# Patient Record
Sex: MALE | Race: WHITE | NOT HISPANIC OR LATINO | Employment: FULL TIME | URBAN - METROPOLITAN AREA
[De-identification: names, ages, dates, MRNs, and addresses within clinical notes are randomized per-mention and may not be internally consistent; named-entity substitution may affect disease eponyms.]

---

## 2017-02-08 ENCOUNTER — TELEPHONE (OUTPATIENT)
Dept: FAMILY MEDICINE | Facility: CLINIC | Age: 62
End: 2017-02-08

## 2017-02-08 DIAGNOSIS — R05.9 COUGH: Primary | ICD-10-CM

## 2017-02-08 NOTE — TELEPHONE ENCOUNTER
Ventolin Inhaler       Last Written Prescription Date: 9/22/2016  Last Fill Quantity: 1 inhaler, # refills: 0    Last Office Visit with G, P or [facility] prescribing provider:  12/15/2016   Future Office Visit:       Date of Last Asthma Action Plan Letter:   There are no preventive care reminders to display for this patient.   Asthma Control Test: No flowsheet data found.    Date of Last Spirometry Test:   No results found for this or any previous visit.

## 2017-02-14 NOTE — TELEPHONE ENCOUNTER
This was given by the ED for bronchitis. Left message for patient to return call  Tatianna Doyle RN

## 2017-02-15 RX ORDER — ALBUTEROL SULFATE 90 UG/1
2 AEROSOL, METERED RESPIRATORY (INHALATION) EVERY 4 HOURS PRN
Qty: 1 INHALER | Refills: 0 | Status: SHIPPED | OUTPATIENT
Start: 2017-02-15 | End: 2017-11-20

## 2017-04-04 ENCOUNTER — OFFICE VISIT (OUTPATIENT)
Dept: FAMILY MEDICINE | Facility: CLINIC | Age: 62
End: 2017-04-04
Payer: COMMERCIAL

## 2017-04-04 VITALS
TEMPERATURE: 98.3 F | WEIGHT: 177.6 LBS | HEIGHT: 66 IN | BODY MASS INDEX: 28.54 KG/M2 | OXYGEN SATURATION: 96 % | DIASTOLIC BLOOD PRESSURE: 67 MMHG | SYSTOLIC BLOOD PRESSURE: 111 MMHG | HEART RATE: 72 BPM

## 2017-04-04 DIAGNOSIS — A08.4 VIRAL GASTROENTERITIS: Primary | ICD-10-CM

## 2017-04-04 DIAGNOSIS — R19.7 DIARRHEA OF PRESUMED INFECTIOUS ORIGIN: ICD-10-CM

## 2017-04-04 LAB
C DIFF TOX B STL QL: NORMAL
CAMPYLOBACTER GROUP BY NAT: NOT DETECTED
ENTERIC PATHOGEN COMMENT: ABNORMAL
NOROVIRUS I AND II BY NAT: ABNORMAL
ROTAVIRUS A BY NAT: NOT DETECTED
SALMONELLA SPECIES BY NAT: NOT DETECTED
SHIGA TOXIN 1 GENE BY NAT: NOT DETECTED
SHIGA TOXIN 2 GENE BY NAT: NOT DETECTED
SHIGELLA SP+EIEC IPAH STL QL NAA+PROBE: NOT DETECTED
SPECIMEN SOURCE: NORMAL
VIBRIO GROUP BY NAT: NOT DETECTED
YERSINIA ENTEROCOLITICA BY NAT: NOT DETECTED

## 2017-04-04 PROCEDURE — 87177 OVA AND PARASITES SMEARS: CPT | Performed by: INTERNAL MEDICINE

## 2017-04-04 PROCEDURE — 87493 C DIFF AMPLIFIED PROBE: CPT | Performed by: INTERNAL MEDICINE

## 2017-04-04 PROCEDURE — 87506 IADNA-DNA/RNA PROBE TQ 6-11: CPT | Performed by: INTERNAL MEDICINE

## 2017-04-04 PROCEDURE — 87329 GIARDIA AG IA: CPT | Performed by: INTERNAL MEDICINE

## 2017-04-04 PROCEDURE — 87209 SMEAR COMPLEX STAIN: CPT | Performed by: INTERNAL MEDICINE

## 2017-04-04 PROCEDURE — 99213 OFFICE O/P EST LOW 20 MIN: CPT | Performed by: INTERNAL MEDICINE

## 2017-04-04 NOTE — LETTER
[facility]  [address] [address]  Phone: [phone]    April 4, 2017        Donnie Ernandez  [address] [place] [facility] [address]          To whom it may concern:    RE: Donnie Ernandez    Patient was seen and treated today at our clinic.  He missed work on 4/3/17 and 4/4/17.    Patient may return to work tomorrow if improved, otherwise he should continue to stay home until feeling better.       Please contact me for questions or concerns.      Sincerely,        Galileo Green MD

## 2017-04-04 NOTE — NURSING NOTE
"Chief Complaint   Patient presents with     Diarrhea     x 4 days, w/ stomach cramps,        Initial /67 (BP Location: Left arm, Patient Position: Chair, Cuff Size: Adult Regular)  Pulse 72  Temp 98.3  F (36.8  C) (Tympanic)  Ht 5' 6\" (1.676 m)  Wt 177 lb 9.6 oz (80.6 kg)  SpO2 96%  BMI 28.67 kg/m2 Estimated body mass index is 28.67 kg/(m^2) as calculated from the following:    Height as of this encounter: 5' 6\" (1.676 m).    Weight as of this encounter: 177 lb 9.6 oz (80.6 kg).  Medication Reconciliation: complete   Beatriz STANTON CMA (AAMA)    "

## 2017-04-04 NOTE — MR AVS SNAPSHOT
After Visit Summary   4/4/2017    Donnie Ernandez    MRN: [id number]           Patient Information     Date Of Birth          1955        Visit Information        Provider Department      4/4/2017 8:40 AM Galileo Green MD [facility]        Today's Diagnoses     Diarrhea of presumed infectious origin    -  1      Care Instructions    I would recommend trying a probiotic and you can also try Imodium (over the counter) to help with diarrhea.      Viral Gastroenteritis (Adult)    Gastroenteritis is commonly called the stomach flu. It is most often caused by a virus that affects the stomach and intestinal tract and usually lasts from 2 to 7 days. Common viruses causing gastroenteritis include norovirus, rotavirus, and hepatitis A. Non-viral causes of gastroenteritis include bacteria, parasites, and toxins.  The danger from repeated vomiting or diarrhea is dehydration. This is the loss of too much fluid from the body. When this occurs, body fluids must be replaced. Antibiotics do not help with this illness because it is usually viral.Simple home treatment will be helpful.  Symptoms of viral gastroenteritis may include:    Watery, loose stools    Stomach pain or abdominal cramps    Fever and chills    Nausea and vomiting    Loss of bowel control    Headache  Home care  Gastroenteritis is transmitted by contact with the stool or vomit of an infected person. This can occur from person to person or from contact with a contaminated surface.  Follow these guidelines when caring for yourself at home:    If symptoms are severe, rest at home for the next 24 hours or until you are feeling better.    Wash your hands with soap and water or use alcohol-based  to prevent the spread of infection. Wash your hands after touching anyone who is sick.    Wash your hands or use alcohol-based  after using the toilet and before meals. Clean the toilet after each use.  Remember these tips when  preparing food:    People with diarrhea should not prepare or serve food to others. When preparing foods, wash your hands before and after.    Wash your hands after using cutting boards, countertops, knives, or utensils that have been in contact with raw food.    Keep uncooked meats away from cooked and ready-to-eat foods.  Medicine  You may use acetaminophen or NSAID medicines like ibuprofen or naproxen to control fever unless another medicine was given. If you have chronic liver or kidney disease, talk with your healthcare provider before using these medicines. Also talk with your provider if you've had a stomach ulcer or gastrointestinal bleeding. Don't give aspirin to anyone under 18 years of age who is ill with a fever. It may cause severe liver damage. Don't use NSAIDS is you are already taking one for another condition (like arthritis) or are on aspirin (such as for heart disease or after a stroke).  If medicine for vomiting or diarrhea are prescribed, take these only as directed. Do not take over-the-counter medicines for vomiting or diarrhea unless instructed by your healthcare provider.  Diet  Follow these guidelines for food:    Water and liquids are important so you don't get dehydrated. Drink a small amount at a time or suck on ice chips if you are vomiting.    If you eat, avoid fatty, greasy, spicy, or fried foods.    Don't eat dairy if you have diarrhea. This can make diarrhea worse.    Avoid tobacco, alcohol, and caffeine which may worsen symptoms.  During the first 24 hours (the first full day), follow the diet below:    Beverages. Sports drinks, soft drinks without caffeine, ginger ale, mineral water (plain or flavored), decaffeinated tea and coffee. If you are very dehydrated, sports drinks aren't a good choice. They have too much sugar and not enough electrolytes. In this case, commercially available products called oral rehydration solutions, are best.    Soups. Eat clear broth, consommé, and  bouillon.    Desserts. Eat gelatin, popsicles, and fruit juice bars.  During the next 24 hours (the second day), you may add the following to the above:    Hot cereal, plain toast, bread, rolls, and crackers    Plain noodles, rice, mashed potatoes, chicken noodle or rice soup    Unsweetened canned fruit (avoid pineapple), bananas    Limit fat intake to less than 15 grams per day. Do this by avoiding margarine, butter, oils, mayonnaise, sauces, gravies, fried foods, peanut butter, meat, poultry, and fish.    Limit fiber and avoid raw or cooked vegetables, fresh fruits (except bananas), and bran cereals.    Limit caffeine and chocolate. Don't use spices or seasonings other than salt.    Limit dairy products.    Avoid alcohol.  During the next 24 hours:    Gradually resume a normal diet as you feel better and your symptoms improve.    If at any time it starts getting worse again, go back to clear liquids until you feel better.  Follow-up care  Follow up with your healthcare provider, or as advised. Call your provider if you don't get better within 24 hours or if diarrhea lasts more than a week. Also follow up if you are unable to keep down liquids and get dehydrated. If a stool (diarrhea) sample was taken, call as directed for the results.  Call 911  Call 911 if any of these occur:    Trouble breathing    Chest pain    Confused    Severe drowsiness or trouble awakening    Fainting or loss of consciousness    Rapid heart rate    Seizure    Stiff neck  When to seek medical advice  Call your healthcare provider right away if any of these occur:    Abdominal pain that gets worse    Continued vomiting (unable to keep liquids down)    Frequent diarrhea (more than 5 times a day)    Blood in vomit or stool (black or red color)    Dark urine, reduced urine output, or extreme thirst    Weakness or dizziness    Drowsiness    Fever of 100.4 F (38 C) oral or higher that does not get better with fever medicine    New rash     "[id number] The "[employer]. [address] [place] [address], [place] [address]. All rights reserved. This information is not intended as a substitute for professional medical care. Always follow your healthcare professional's instructions.              Follow-ups after your visit        Future tests that were ordered for you today     Open Future Orders        Priority Expected Expires Ordered    Enteric Bacteria and Virus Panel by SAMUEL Stool Routine  4/4/2018 4/4/2017    Giardia antigen Routine  4/4/2018 4/4/2017    Ova and Parasite Exam Routine Routine  4/4/2018 4/4/2017    Clostridium difficile toxin B PCR Routine  4/4/2018 4/4/2017            Who to contact     If you have questions or need follow up information about today's clinic visit or your schedule please contact [facility] directly at [phone].  Normal or non-critical lab and imaging results will be communicated to you by Allurenthart, letter or phone within 4 business days after the clinic has received the results. If you do not hear from us within 7 days, please contact the clinic through MyChart or phone. If you have a critical or abnormal lab result, we will notify you by phone as soon as possible.  Submit refill requests through [employer] or call your pharmacy and they will forward the refill request to us. Please allow 3 business days for your refill to be completed.          Additional Information About Your Visit        MyChart Information     [employer] lets you send messages to your doctor, view your test results, renew your prescriptions, schedule appointments and more. To sign up, go to [email or web address] . Click on \"Log in\" on the left side of the screen, which will take you to the Welcome page. Then click on \"Sign up Now\" on the right side of the page.     You will be asked to enter the access code listed below, as well as some personal information. Please follow the directions to create your username and password.     Your " "access code is: [email or web address]  Expires: 7/3/2017  9:33 AM     Your access code will  in 90 days. If you need help or a new code, please call your [facility] or [phone].        Care EveryWhere ID     This is your Care EveryWhere ID. This could be used by other organizations to access your [place] medical records  [id number]        Your Vitals Were     Pulse Temperature Height Pulse Oximetry BMI (Body Mass Index)       72 98.3  F (36.8  C) (Tympanic) 5' 6\" (1.676 m) 96% 28.67 kg/m2        Blood Pressure from Last 3 Encounters:   17 111/67   12/15/16 135/73   16 132/81    Weight from Last 3 Encounters:   17 177 lb 9.6 oz (80.6 kg)   12/15/16 181 lb (82.1 kg)   16 183 lb 9.6 oz (83.3 kg)               Primary Care Provider Office Phone # Fax #    Mae Galileo Jones, [facility] [phone]       [facility] [address] [address]        Thank you!     Thank you for choosing [facility]  for your care. Our goal is always to provide you with excellent care. Hearing back from our patients is one way we can continue to improve our services. Please take a few minutes to complete the written survey that you may receive in the mail after your visit with us. Thank you!             Your Updated Medication List - Protect others around you: Learn how to safely use, store and throw away your medicines at www.disposemymeds.org.          This list is accurate as of: 17  9:33 AM.  Always use your most recent med list.                   Brand Name Dispense Instructions for use    albuterol 108 (90 BASE) MCG/ACT Inhaler    albuterol    1 Inhaler    Inhale 2 puffs into the lungs every 4 hours as needed for shortness of breath / dyspnea Needs appt before next refill       fluticasone 50 MCG/ACT spray    FLONASE    1 Bottle    Spray 1 spray into both nostrils 2 times daily       losartan 50 MG tablet    COZAAR    90 tablet    Take 1 " tablet (50 mg) by mouth daily       metoprolol 50 MG tablet    LOPRESSOR    180 tablet    Take 1 tablet (50 mg) by mouth 2 times daily       * order for DME     1    C-Pap supplies. Mask of choice.       * order for DME      CPAP: 6 cm H2O  CPAP mask fitting  Lifetime need and heated humidity.       pregabalin 50 MG capsule    LYRICA    60 capsule    Take 1 capsule (50 mg) by mouth 2 times daily       TYLENOL EXTRA STRENGTH PO      Take by mouth 2 times daily       zolpidem 12.5 MG CR tablet    AMBIEN CR    30 tablet    Take 1 tablet (12.5 mg) by mouth nightly as needed for sleep       * Notice:  This list has 2 medication(s) that are the same as other medications prescribed for you. Read the directions carefully, and ask your doctor or other care provider to review them with you.

## 2017-04-04 NOTE — PROGRESS NOTES
SUBJECTIVE:                                                    Donnie Ernandez is a 61 year old male who presents to clinic today for the following health issues:  Chief Complaint   Patient presents with     Diarrhea     x 4 days, w/ stomach cramps,      Diarrhea     Onset: x     Description:   Consistency of stool: watery and mucousy  Blood in stool: no   Number of loose stools in past 24 hours: 8 already today    Progression of Symptoms:  same    Accompanying Signs & Symptoms:  Fever: no - but feels hot at home  Nausea or vomiting; YES- slight nausea no vomiting   Abdominal pain: YES  Episodes of constipation: no   Weight loss: YES   History:   Ill contacts: no   Recent use of antibiotics: no    Recent travels: no          Recent medication-new or changes(Rx or OTC): no     Precipitating factors:   Any food.    Patient reports eating a lot less, but having a lot of loose stool     Alleviating factors:   None        Therapies Tried and outcome:  None     Donnie started having symptoms overnight on Friday after eating at a deli for lunch that day.  Otherwise has not had any unusual foods exposures or travel.  He does have well water at home, though no one else has been sick.  Has a dog and a cat, no other animal exposures.  His stomach cramps are improving, but he continues to have multiple watery BMs daily.  No blood in stool.  He is having BMs overnight.     He does have a history of chronic diarrhea in the past, but he discovered this was due to lactose intolerance, and this has resolved since eliminating dairy from the diet.  Current symptoms are much more severe than those he had in the past.      Problem list and histories reviewed & adjusted, as indicated.  Additional history: as documented    Current Outpatient Prescriptions   Medication Sig Dispense Refill     losartan (COZAAR) 50 MG tablet Take 1 tablet (50 mg) by mouth daily 90 tablet 3     zolpidem (AMBIEN CR) 12.5 MG CR tablet Take 1 tablet (12.5 mg) by  "mouth nightly as needed for sleep 30 tablet 5     metoprolol (LOPRESSOR) 50 MG tablet Take 1 tablet (50 mg) by mouth 2 times daily 180 tablet 3     albuterol (ALBUTEROL) 108 (90 BASE) MCG/ACT Inhaler Inhale 2 puffs into the lungs every 4 hours as needed for shortness of breath / dyspnea Needs appt before next refill 1 Inhaler 0     Acetaminophen (TYLENOL EXTRA STRENGTH PO) Take by mouth 2 times daily       pregabalin (LYRICA) 50 MG capsule Take 1 capsule (50 mg) by mouth 2 times daily 60 capsule 1     fluticasone (FLONASE) 50 MCG/ACT nasal spray Spray 1 spray into both nostrils 2 times daily 1 Bottle 11     ORDER FOR DME CPAP:  6 cm H2O    CPAP mask fitting    Lifetime need and heated humidity.          ORDER FOR DME C-Pap supplies. Mask of choice. 1 1 year     Allergies   Allergen Reactions     Lisinopril Diarrhea and Cough     Advil [Alkylamines] GI Disturbance     Prednisone      Insomnia  Facial flushing  sweating     Sulfa Drugs Hives       Reviewed and updated as needed this visit by clinical staff  Tobacco  Allergies  Med Hx  Surg Hx  Fam Hx  Soc Hx      Reviewed and updated as needed this visit by Provider         ROS:  Constitutional and GI systems are negative, except as otherwise noted.    OBJECTIVE:                                                    /67 (BP Location: Left arm, Patient Position: Chair, Cuff Size: Adult Regular)  Pulse 72  Temp 98.3  F (36.8  C) (Tympanic)  Ht 5' 6\" (1.676 m)  Wt 177 lb 9.6 oz (80.6 kg)  SpO2 96%  BMI 28.67 kg/m2  Body mass index is 28.67 kg/(m^2).    GENERAL: healthy, alert and no distress  RESP: lungs clear to auscultation - no rales, rhonchi or wheezes  CV: regular rate and rhythm, normal S1 S2, no S3 or S4, no murmur, click or rub  ABDOMEN: soft, mild generalized pressure on palpation, no rebound or guarding, no hepatomegaly (spleen surgically absent), no masses and bowel sounds normal       ASSESSMENT/PLAN:                                              "           1. Viral gastroenteritis  2. Diarrhea of presumed infectious origin    Donnie's symptoms are most likely due to viral gastroenteritis, however, given the frequency of his stools, it is reasonable to check for other infectious causes if diarrhea does not improve soon.  Will check for parasites since he has well water.  He was counseled on home care, handout provided.  He can try some Imodium for diarrhea, though if infectious studies return positive, may need to discontinue this.      - Clostridium difficile toxin B PCR; Future  - Enteric Bacteria and Virus Panel by SAMUEL Stool; Future  - Giardia antigen; Future  - Ova and Parasite Exam Routine; Future  - Ova and Parasite Exam Routine; Future      FernandaLatha Green MD  [facility]

## 2017-04-05 LAB
G LAMBLIA AG STL QL IA: NORMAL
MICRO REPORT STATUS: NORMAL
MICRO REPORT STATUS: NORMAL
O+P STL MICRO: NORMAL
SPECIMEN SOURCE: NORMAL
SPECIMEN SOURCE: NORMAL

## 2017-04-17 ENCOUNTER — OFFICE VISIT (OUTPATIENT)
Dept: FAMILY MEDICINE | Facility: CLINIC | Age: 62
End: 2017-04-17
Payer: COMMERCIAL

## 2017-04-17 VITALS
HEART RATE: 62 BPM | HEIGHT: 66 IN | WEIGHT: 186.2 LBS | TEMPERATURE: 99.2 F | BODY MASS INDEX: 29.92 KG/M2 | OXYGEN SATURATION: 96 % | SYSTOLIC BLOOD PRESSURE: 150 MMHG | DIASTOLIC BLOOD PRESSURE: 74 MMHG

## 2017-04-17 DIAGNOSIS — J32.9 VIRAL SINUSITIS: Primary | ICD-10-CM

## 2017-04-17 DIAGNOSIS — B97.89 VIRAL SINUSITIS: Primary | ICD-10-CM

## 2017-04-17 PROCEDURE — 99213 OFFICE O/P EST LOW 20 MIN: CPT | Performed by: INTERNAL MEDICINE

## 2017-04-17 RX ORDER — FLUTICASONE PROPIONATE 50 MCG
1 SPRAY, SUSPENSION (ML) NASAL
Qty: 1 BOTTLE | Refills: 11 | Status: SHIPPED | OUTPATIENT
Start: 2017-04-17 | End: 2017-11-20

## 2017-04-17 RX ORDER — CODEINE PHOSPHATE AND GUAIFENESIN 10; 100 MG/5ML; MG/5ML
1 SOLUTION ORAL EVERY 4 HOURS PRN
Qty: 120 ML | Refills: 0 | Status: SHIPPED | OUTPATIENT
Start: 2017-04-17 | End: 2017-05-02

## 2017-04-17 NOTE — MR AVS SNAPSHOT
After Visit Summary   4/17/2017    Donnie Ernandez    MRN: [id number]           Patient Information     Date Of Birth          1955        Visit Information        Provider Department      4/17/2017 8:20 AM Galileo Green MD [facility]        Today's Diagnoses     Viral sinusitis    -  1      Care Instructions      If symptoms are not improved in one week, please call and we will send in an antibiotic.      Sinusitis (No Antibiotics)    The sinuses are air-filled spaces within the bones of the face. They connect to the inside of the nose. Sinusitis is an inflammation of the tissue lining the sinus cavity. Sinus inflammation can occur during a cold. It can also be due to allergies to pollens and other particles in the air. It can cause symptoms such as sinus congestion, headache, sore throat, facial swelling and fullness. It may also cause a low-grade fever. No infection is present, and no antibiotic treatment is needed.  Home care    Drink plenty of water, hot tea, and other liquids. This may help thin mucus. It also may promote sinus drainage.    Heat may help soothe painful areas of the face. Use a towel soaked in hot water. Or,  the shower and direct the hot spray onto your face. Using a vaporizer along with a menthol rub at night may also help.     An expectorant containing guaifenesin may help thin the mucus and promote drainage from the sinuses.    Over-the-counter decongestants may be used unless a similar medicine was prescribed. Nasal sprays work the fastest. Use one that contains phenylephrine or oxymetazoline. First blow the nose gently. Then use the spray. Do not use these medicines more often than directed on the label or symptoms may get worse. You may also use tablets containing pseudoephedrine. Avoid products that combine ingredients, because side effects may be increased. Read labels. You can also ask the pharmacist for help. (NOTE: Persons with high blood  pressure should not use decongestants. They can raise blood pressure.)    Over-the-counter antihistamines may help if allergies contributed to your sinusitis.      Use acetaminophen or ibuprofen to control pain, unless another pain medicine was prescribed. (If you have chronic liver or kidney disease or ever had a stomach ulcer, talk with your doctor before using these medicines. Aspirin should never be used in anyone under 18 years of age who is ill with a fever. It may cause severe liver damage.)    Use nasal rinses or irrigation as instructed by your health care provider.    Don't smoke. This can worsen symptoms.  Follow-up care  Follow up with your healthcare provider or our staff if you are not improving within the next week.  When to seek medical advice  Call your healthcare provider if any of these occur:    Green or yellow discharge from the nose or into the throat    Facial pain or headache becoming more severe    Stiff neck    Unusual drowsiness or confusion    Swelling of the forehead or eyelids    Vision problems, including blurred or double vision    Fever of 100.4 F (38 C) or higher, or as directed by your healthcare provider    Seizure    Breathing problems    Symptoms not resolving within 10 days    4905-7993 [employer]. [address] [place] [address]. All rights reserved. This information is not intended as a substitute for professional medical care. Always follow your healthcare professional's instructions.              Follow-ups after your visit        Who to contact     If you have questions or need follow up information about today's clinic visit or your schedule please contact [facility] directly at [phone].  Normal or non-critical lab and imaging results will be communicated to you by MyChart, letter or phone within 4 business days after the clinic has received the results. If you do not hear from us within 7 days, please contact the clinic through  "[facility] or phone. If you have a critical or abnormal lab result, we will notify you by phone as soon as possible.  Submit refill requests through [employer] or call your pharmacy and they will forward the refill request to us. Please allow 3 business days for your refill to be completed.          Additional Information About Your Visit        [employer]* Information     [employer] lets you send messages to your doctor, view your test results, renew your prescriptions, schedule appointments and more. To sign up, go to [email or web address] . Click on \"Log in\" on the left side of the screen, which will take you to the Welcome page. Then click on \"Sign up Now\" on the right side of the page.     You will be asked to enter the access code listed below, as well as some personal information. Please follow the directions to create your username and password.     Your access code is: [email or web address]  Expires: 7/3/2017  9:33 AM     Your access code will  in 90 days. If you need help or a new code, please call your [facility] clinic or [phone].        Care EveryWhere ID     This is your Care EveryWhere ID. This could be used by other organizations to access your [facility] medical records  [id number]        Your Vitals Were     Pulse Temperature Height Pulse Oximetry BMI (Body Mass Index)       62 99.2  F (37.3  C) (Tympanic) 5' 6\" (1.676 m) 96% 30.05 kg/m2        Blood Pressure from Last 3 Encounters:   17 150/74   17 111/67   12/15/16 135/73    Weight from Last 3 Encounters:   17 186 lb 3.2 oz (84.5 kg)   17 177 lb 9.6 oz (80.6 kg)   12/15/16 181 lb (82.1 kg)              Today, you had the following     No orders found for display         Today's Medication Changes          These changes are accurate as of: 17  8:45 AM.  If you have any questions, ask your nurse or doctor.               Start taking these medicines.        Dose/Directions    guaiFENesin-codeine 100-10 MG/5ML Soln solution "   Commonly known as:  ROBITUSSIN AC   Used for:  Viral sinusitis   Started by:  Galileo Green MD        Dose:  1 tsp.   Take 5 mLs by mouth every 4 hours as needed for cough   Quantity:  120 mL   Refills:  0            Where to get your medicines      These medications were sent to [facility] [place], [place] - [address]  [address] [address]     Phone:  [phone]     fluticasone 50 MCG/ACT spray         Some of these will need a paper prescription and others can be bought over the counter.  Ask your nurse if you have questions.     Bring a paper prescription for each of these medications     guaiFENesin-codeine 100-10 MG/5ML Soln solution                Primary Care Provider Office Phone # Fax #    Mae MAYANK Zhou [phone]       [facility] [address] [address]        Thank you!     Thank you for choosing [facility]  for your care. Our goal is always to provide you with excellent care. Hearing back from our patients is one way we can continue to improve our services. Please take a few minutes to complete the written survey that you may receive in the mail after your visit with us. Thank you!             Your Updated Medication List - Protect others around you: Learn how to safely use, store and throw away your medicines at www.disposemymeds.org.          This list is accurate as of: 4/17/17  8:45 AM.  Always use your most recent med list.                   Brand Name Dispense Instructions for use    albuterol 108 (90 BASE) MCG/ACT Inhaler    albuterol    1 Inhaler    Inhale 2 puffs into the lungs every 4 hours as needed for shortness of breath / dyspnea Needs appt before next refill       fluticasone 50 MCG/ACT spray    FLONASE    1 Bottle    Spray 1 spray into both nostrils 2 times daily       guaiFENesin-codeine 100-10 MG/5ML Soln solution    ROBITUSSIN AC    120 mL    Take 5 mLs by mouth  every 4 hours as needed for cough       losartan 50 MG tablet    COZAAR    90 tablet    Take 1 tablet (50 mg) by mouth daily       metoprolol 50 MG tablet    LOPRESSOR    180 tablet    Take 1 tablet (50 mg) by mouth 2 times daily       * order for DME     1    C-Pap supplies. Mask of choice.       * order for DME      CPAP: 6 cm H2O  CPAP mask fitting  Lifetime need and heated humidity.       pregabalin 50 MG capsule    LYRICA    60 capsule    Take 1 capsule (50 mg) by mouth 2 times daily       TYLENOL EXTRA STRENGTH PO      Take by mouth 2 times daily       zolpidem 12.5 MG CR tablet    AMBIEN CR    30 tablet    Take 1 tablet (12.5 mg) by mouth nightly as needed for sleep       * Notice:  This list has 2 medication(s) that are the same as other medications prescribed for you. Read the directions carefully, and ask your doctor or other care provider to review them with you.

## 2017-04-17 NOTE — LETTER
[facility]  [address] [address]  Phone: [phone]    April 17, 2017        Donnie Ernandez  [address] [facility] [address]          To whom it may concern:    RE: Donnie Ernandez    Patient was seen and treated today at our clinic and missed work.  He can return to work tomorrow if feeling better.     Please contact me for questions or concerns.      Sincerely,        Galileo Green MD

## 2017-04-17 NOTE — NURSING NOTE
"Chief Complaint   Patient presents with     Sinus Problem     x 2 days, cough, fever of 101 at home      Letter for School/Work     note for work        Initial /74 (BP Location: Right arm, Patient Position: Chair, Cuff Size: Adult Regular)  Pulse 62  Temp 99.2  F (37.3  C) (Tympanic)  Ht 5' 6\" (1.676 m)  Wt 186 lb 3.2 oz (84.5 kg)  SpO2 96%  BMI 30.05 kg/m2 Estimated body mass index is 30.05 kg/(m^2) as calculated from the following:    Height as of this encounter: 5' 6\" (1.676 m).    Weight as of this encounter: 186 lb 3.2 oz (84.5 kg).  Medication Reconciliation: complete   Beatriz STANTON CMA (AAMA)    "

## 2017-04-17 NOTE — PROGRESS NOTES
SUBJECTIVE:                                                    Donnie Ernandez is a 61 year old male who presents to clinic today for the following health issues:  Chief Complaint   Patient presents with     Sinus Problem     x 2 days, cough, fever of 101 at home      Letter for School/Work     note for work      ENT Symptoms             Symptoms: cc Present Absent Comment   Fever/Chills   x 101 at home, 99.2 in clinic    Fatigue  x     Muscle Aches  x  Headache, neck    Eye Irritation  x  Watery eyes   Sneezing  x     Nasal Mike/Drg x x     Sinus Pressure/Pain  x  A lot of pressure    Loss of smell  x     Dental pain   x    Sore Throat  x  Mild   Swollen Glands   x    Ear Pain/Fullness  x  Pressure    Cough  x  On and off   Wheeze  x     Chest Pain   x    Shortness of breath  x  Needs to breath out of mouth    Rash   x    Other         Symptom duration:  x 3 days    Symptom severity:     Treatments tried:  Tylenol    Contacts:  co-workers w/ similar symptoms          Problem list and histories reviewed & adjusted, as indicated.  Additional history: as documented    Current Outpatient Prescriptions   Medication Sig Dispense Refill     guaiFENesin-codeine (ROBITUSSIN AC) 100-10 MG/5ML SOLN solution Take 5 mLs by mouth every 4 hours as needed for cough 120 mL 0     fluticasone (FLONASE) 50 MCG/ACT spray Spray 1 spray into both nostrils 2 times daily 1 Bottle 11     losartan (COZAAR) 50 MG tablet Take 1 tablet (50 mg) by mouth daily 90 tablet 3     zolpidem (AMBIEN CR) 12.5 MG CR tablet Take 1 tablet (12.5 mg) by mouth nightly as needed for sleep 30 tablet 5     metoprolol (LOPRESSOR) 50 MG tablet Take 1 tablet (50 mg) by mouth 2 times daily 180 tablet 3     albuterol (ALBUTEROL) 108 (90 BASE) MCG/ACT Inhaler Inhale 2 puffs into the lungs every 4 hours as needed for shortness of breath / dyspnea Needs appt before next refill 1 Inhaler 0     Acetaminophen (TYLENOL EXTRA STRENGTH PO) Take by mouth 2 times daily        "pregabalin (LYRICA) 50 MG capsule Take 1 capsule (50 mg) by mouth 2 times daily 60 capsule 1     ORDER FOR DME CPAP:  6 cm H2O    CPAP mask fitting    Lifetime need and heated humidity.          ORDER FOR DME C-Pap supplies. Mask of choice. 1 1 year     Allergies   Allergen Reactions     Lisinopril Diarrhea and Cough     Advil [Alkylamines] GI Disturbance     Prednisone      Insomnia  Facial flushing  sweating     Sulfa Drugs Hives       Reviewed and updated as needed this visit by clinical staff  Tobacco  Allergies  Med Hx  Surg Hx  Fam Hx  Soc Hx      Reviewed and updated as needed this visit by Provider         ROS:  Constitutional, HEENT, pulmonary systems are negative, except as otherwise noted.    OBJECTIVE:                                                    /74 (BP Location: Right arm, Patient Position: Chair, Cuff Size: Adult Regular)  Pulse 62  Temp 99.2  F (37.3  C) (Tympanic)  Ht 5' 6\" (1.676 m)  Wt 186 lb 3.2 oz (84.5 kg)  SpO2 96%  BMI 30.05 kg/m2  Body mass index is 30.05 kg/(m^2).    GENERAL: alert, sounds congested  EYES: Eyes grossly normal to inspection, PERRL and conjunctivae and sclerae normal  HENT: ear canals and TMs normal, sinuses with some diffuse pressure to percussion, nose and mouth without ulcers or lesions, oropharynx red but no tonsillar exudates  NECK: no adenopathy, no asymmetry, masses, or scars  RESP: lungs clear to auscultation - no rales, rhonchi or wheezes  CV: regular rate and rhythm, normal S1 S2, no S3 or S4, no murmur, click or rub        ASSESSMENT/PLAN:                                                        1. Viral sinusitis    Symptoms and duration of illness most consistent with viral sinusitis.  Advised using Flonase to help with sinus drainage and will try codeine cough syrup for cough at night.  He can use albuterol that he has at home prn for SOB/wheezing, but lungs are clear currently.  Continue OTCs.  Advised him to call if not improved in one " week and would send in a prescription for Augmentin for bacterial sinusitis at that point.      - guaiFENesin-codeine (ROBITUSSIN AC) 100-10 MG/5ML SOLN solution; Take 5 mLs by mouth every 4 hours as needed for cough  Dispense: 120 mL; Refill: 0  - fluticasone (FLONASE) 50 MCG/ACT spray; Spray 1 spray into both nostrils 2 times daily  Dispense: 1 Bottle; Refill: 11    Galileo Green MD  [facility]

## 2017-04-17 NOTE — PATIENT INSTRUCTIONS
If symptoms are not improved in one week, please call and we will send in an antibiotic.      Sinusitis (No Antibiotics)    The sinuses are air-filled spaces within the bones of the face. They connect to the inside of the nose. Sinusitis is an inflammation of the tissue lining the sinus cavity. Sinus inflammation can occur during a cold. It can also be due to allergies to pollens and other particles in the air. It can cause symptoms such as sinus congestion, headache, sore throat, facial swelling and fullness. It may also cause a low-grade fever. No infection is present, and no antibiotic treatment is needed.  Home care    Drink plenty of water, hot tea, and other liquids. This may help thin mucus. It also may promote sinus drainage.    Heat may help soothe painful areas of the face. Use a towel soaked in hot water. Or,  the shower and direct the hot spray onto your face. Using a vaporizer along with a menthol rub at night may also help.     An expectorant containing guaifenesin may help thin the mucus and promote drainage from the sinuses.    Over-the-counter decongestants may be used unless a similar medicine was prescribed. Nasal sprays work the fastest. Use one that contains phenylephrine or oxymetazoline. First blow the nose gently. Then use the spray. Do not use these medicines more often than directed on the label or symptoms may get worse. You may also use tablets containing pseudoephedrine. Avoid products that combine ingredients, because side effects may be increased. Read labels. You can also ask the pharmacist for help. (NOTE: Persons with high blood pressure should not use decongestants. They can raise blood pressure.)    Over-the-counter antihistamines may help if allergies contributed to your sinusitis.      Use acetaminophen or ibuprofen to control pain, unless another pain medicine was prescribed. (If you have chronic liver or kidney disease or ever had a stomach ulcer, talk with your  doctor before using these medicines. Aspirin should never be used in anyone under 18 years of age who is ill with a fever. It may cause severe liver damage.)    Use nasal rinses or irrigation as instructed by your health care provider.    Don't smoke. This can worsen symptoms.  Follow-up care  Follow up with your healthcare provider or our staff if you are not improving within the next week.  When to seek medical advice  Call your healthcare provider if any of these occur:    Green or yellow discharge from the nose or into the throat    Facial pain or headache becoming more severe    Stiff neck    Unusual drowsiness or confusion    Swelling of the forehead or eyelids    Vision problems, including blurred or double vision    Fever of 100.4 F (38 C) or higher, or as directed by your healthcare provider    Seizure    Breathing problems    Symptoms not resolving within 10 days    3380-6717 [employer]. [address] [place] [address], [place] [address]. All rights reserved. This information is not intended as a substitute for professional medical care. Always follow your healthcare professional's instructions.

## 2017-04-19 ENCOUNTER — TELEPHONE (OUTPATIENT)
Dept: FAMILY MEDICINE | Facility: CLINIC | Age: 62
End: 2017-04-19

## 2017-04-19 DIAGNOSIS — B96.89 BACTERIAL SINUSITIS: Primary | ICD-10-CM

## 2017-04-19 DIAGNOSIS — J32.9 BACTERIAL SINUSITIS: Primary | ICD-10-CM

## 2017-04-19 NOTE — TELEPHONE ENCOUNTER
Left message for the patient to call the clinic.  Elisabet PORTILLO RN          OV from 4/17/17.    1. Viral sinusitis     Symptoms and duration of illness most consistent with viral sinusitis. Advised using Flonase to help with sinus drainage and will try codeine cough syrup for cough at night. He can use albuterol that he has at home prn for SOB/wheezing, but lungs are clear currently. Continue OTCs. Advised him to call if not improved in one week and would send in a prescription for Augmentin for bacterial sinusitis at that point.

## 2017-04-19 NOTE — TELEPHONE ENCOUNTER
Reason for Call:  Other prescription    Detailed comments: pt is calling to get antibiotics for a sinus infections seen in clinic 4/17       Phone Number Patient can be reached at: [phone]    Best Time: any     Can we leave a detailed message on this number? YES    Call taken on 4/19/2017 at 7:58 AM by Sakina Marr

## 2017-04-21 NOTE — PATIENT INSTRUCTIONS
Follow up: If fails to improve or worsens.  Plan:  Even when you begin to feel better, continue to take your antibiotic as directed until all medication is gone.  Instructions:  If you need to contact us please call, [phone]   1.  For discomfort, you may take ibuprofen or acetaminophen as directed on the bottle (DO NOT use aspirin in children or teenagers).   2.  Over the counter decongestant, (if you have high blood pressure, choose one that does not contain pseudoephedrine)   3.  Nasal saline rinse, (Neti-pot or a nasal saline spray is preferred to Afrin nasal spray).   4.  Afrin nasal spray, (if you choose this option, do not use it longer than 5 days).    What is sinusitis?   Sinusitis is swollen, infected linings of the sinuses. The sinuses are hollow spaces in the bones of your face and skull. They connect with the nose through small openings. Like the nose, their linings make mucus.   How does it occur?   Sinusitis occurs when the sinus linings become infected. The passageways from the sinuses to the nose are very narrow. Swelling and mucus may block the passageways. This leads to pressure changes in the sinuses that can be painful.   A number of things can cause swelling and sinusitis. Most often it's allergens (things that cause allergies, like pollen and mold) and viruses, such as viruses that cause the common cold. Whether the cause is allergies or a virus, the sinus linings can swell. When swelling causes the sinus passageway to swell shut, bacteria, viruses, and even fungus can be trapped in the sinuses and cause a sinus infection.   If your nasal bones have been injured or are deformed, causing partial blockage of the sinus openings, you are more likely to get sinusitis.   What are the symptoms?   Symptoms include:     feeling of fullness or pressure in your head     a headache that is most painful when you first wake up in the morning or when you bend your head down or forward     pain above or  below your eyes     aching in the upper jaw and teeth     runny or stuffy nose     cough, especially at night     fluid draining down the back of your throat (postnasal drainage)     sore throat in the morning or evening.   How is it diagnosed?   Your healthcare provider will ask about your symptoms and will examine you. You may have an X-ray to look for swelling, fluid, or small benign growths (polyps) in the sinuses.   How is it treated?   Decongestants may help. They may be nonprescription or prescription. They are available as liquids, pills, and nose sprays.  Nasal saline rinse, such as the Neti-pot or a nasal saline spray is preferred.  If you choose to use a product such as Afrin nasal spray, do not use it longer than 5 days.  Your healthcare provider may prescribe an antibiotic. In some cases you may need to take decongestants and antibiotics for several weeks.   You may need nonprescription medicine for pain, such as acetaminophen or ibuprofen. Children under 18 years of age should not take aspirin or products containing salicylate (such as Pepto-Bismol) because of the risk of Reye's syndrome unless recommended by a healthcare provider.   If you have chronic or repeated sinus infections, allergies may be the cause. Your healthcare provider may prescribe antihistamine tablets or prescription nasal sprays (steroids or cromolyn) to treat the allergies.   If you have chronic, severe sinusitis that does not respond to treatment with medicines, surgery may be done. The surgeon can create an extra or enlarged passageway in the wall of the sinus cavity. This allows the sinuses to drain more easily through the nasal passages. This should help them stay free of infection.   How long will the effects last?   Symptoms may improve gradually over 3 to 10 days. Depending on what caused the sinusitis and how severe it is, it may last for days or weeks.   How can I take care of myself?     Follow your healthcare provider's  instructions.     Avoid tobacco smoke.     If you have allergies, take care to avoid the things you are allergic to, such as animal dander.     Add moisture to the air with a humidifier or a vaporizer, unless you have mold allergy (mold may grow in your vaporizer).     Inhale steam from a basin of hot water or shower to open your sinuses and relieve pain.     Use saline nasal sprays to help wash out nasal passages and clear some mucus from the airways.     Use decongestants as directed on the label or by your provider.     If you are using a nonprescription nasal-spray decongestant, generally you should not use it for more than 3 days. After 3 days it may cause your symptoms to get worse. Ask your healthcare provider if it is OK for you to use a nasal spray decongestant longer than this.     Get plenty of rest.     Drink more fluids to keep the mucus as moist and liquid as possible to help draining of your sinuses.     Put warm compresses on painful areas.     Take antibiotics as prescribed. Use all of the medicine, even after you feel better. Some sinus infections require 2 to 4 weeks of antibiotic treatment.     See your healthcare provider if the pain lasts for several days or gets worse.     If the sinus areas above or below your eyes are swollen or bulging, see your healthcare provider right away. This symptom may mean that the infection is spreading. A spreading infection can affect other parts of your body--even the brain--and needs to be treated promptly.   How can I help prevent sinusitis?     Treat your colds and allergies promptly. Use decongestants as soon as you start having symptoms.     Do not smoke and stay away from secondhand smoke.     Drink lots of fluids to keep the mucus thin.     Humidify your home if the air is particularly dry.     If you have sinus infections often, consider having allergy tests.     If sinusitis continues to be a problem despite treatment, you might need an exam by an ear,  nose, and throat doctor (called an ENT or otolaryngologist). The specialist will check for polyps or a deformed bone that may be blocking your sinuses.

## 2017-04-21 NOTE — TELEPHONE ENCOUNTER
Pt called back.  He states that he is definitely no better.  Sinuses are still congested with lots of sinus drainage.   Drainage is clear.  Pt reports a low-grade fever off and on too.  Ordered antibiotic as directed below.  Reviewed care instructions with pt in Pt Instructions below.  Marva Degroot RN

## 2017-04-27 ENCOUNTER — TELEPHONE (OUTPATIENT)
Dept: FAMILY MEDICINE | Facility: CLINIC | Age: 62
End: 2017-04-27

## 2017-04-27 NOTE — TELEPHONE ENCOUNTER
I have called the patient and got the SO.  Per her the patient forgot his cell phone today.  Per the SO the patient is feeling better.  I have informed her then no further treatment is needed.  They will let us know if anything is different. Elisabet PORTILLO RN

## 2017-04-27 NOTE — TELEPHONE ENCOUNTER
It looks like he has been taking it for a week.  How are his sinus symptoms?  If getting better, he doesn't need any more antibiotics.  Mae Jones, CNP

## 2017-04-27 NOTE — TELEPHONE ENCOUNTER
Mae,    Patient is at work but I briefly spoke to the wife.  He has tried eating with the augmentin given to him for his sinus infection but has not stopped it completely as it causes GI distress. I have suggested 1-2 servings of yogurt with live cultures.  Patient wants other antibiotic.  Please advise. Elisabet PORTILLO RN

## 2017-04-27 NOTE — TELEPHONE ENCOUNTER
Reason for Call:  Other prescription    Detailed comments: pt is calling because the medication amoxicillin-clavulanate (AUGMENTIN) 875-125 MG per tablet cause GI issues so the pt stopped taking it   And wants a different medication     Phone Number Patient can be reached at: Home number on file [phone] (home)    Best Time: any     Can we leave a detailed message on this number? YES    Call taken on 4/27/2017 at 10:58 AM by Sakina Marr

## 2017-04-30 ENCOUNTER — HOSPITAL ENCOUNTER (EMERGENCY)
Facility: CLINIC | Age: 62
Discharge: HOME | End: 2017-04-30
Attending: EMERGENCY MEDICINE | Admitting: EMERGENCY MEDICINE
Payer: COMMERCIAL

## 2017-04-30 VITALS
RESPIRATION RATE: 18 BRPM | SYSTOLIC BLOOD PRESSURE: 151 MMHG | TEMPERATURE: 98 F | OXYGEN SATURATION: 97 % | BODY MASS INDEX: 29.05 KG/M2 | HEART RATE: 61 BPM | WEIGHT: 180 LBS | DIASTOLIC BLOOD PRESSURE: 83 MMHG

## 2017-04-30 DIAGNOSIS — R05.9 COUGH: ICD-10-CM

## 2017-04-30 DIAGNOSIS — R09.89 CHEST CONGESTION: ICD-10-CM

## 2017-04-30 DIAGNOSIS — R07.0 THROAT PAIN: ICD-10-CM

## 2017-04-30 LAB
DEPRECATED S PYO AG THROAT QL EIA: NORMAL
FLUAV+FLUBV AG SPEC QL: NEGATIVE
FLUAV+FLUBV AG SPEC QL: NORMAL
MICRO REPORT STATUS: NORMAL
SPECIMEN SOURCE: NORMAL
SPECIMEN SOURCE: NORMAL

## 2017-04-30 PROCEDURE — 87880 STREP A ASSAY W/OPTIC: CPT | Performed by: EMERGENCY MEDICINE

## 2017-04-30 PROCEDURE — 87804 INFLUENZA ASSAY W/OPTIC: CPT | Performed by: EMERGENCY MEDICINE

## 2017-04-30 PROCEDURE — 87081 CULTURE SCREEN ONLY: CPT | Performed by: EMERGENCY MEDICINE

## 2017-04-30 PROCEDURE — 99283 EMERGENCY DEPT VISIT LOW MDM: CPT

## 2017-04-30 PROCEDURE — 99284 EMERGENCY DEPT VISIT MOD MDM: CPT | Performed by: EMERGENCY MEDICINE

## 2017-04-30 NOTE — ED AVS SNAPSHOT
[facility] Emergency Department    [address] [facility] [address] [address]    Phone:  [phone]    Fax:  [phone]                                       Donnie Ernandez   MRN: [id number]    Department:  [facility] Emergency Department   Date of Visit:  4/30/2017           After Visit Summary Signature Page     I have received my discharge instructions, and my questions have been answered. I have discussed any challenges I see with this plan with the nurse or doctor.    ..........................................................................................................................................  Patient/Patient Representative Signature      ..........................................................................................................................................  Patient Representative Print Name and Relationship to Patient    ..................................................               ................................................  Date                                            Time    ..........................................................................................................................................  Reviewed by Signature/Title    ...................................................              ..............................................  Date                                                            Time

## 2017-04-30 NOTE — ED AVS SNAPSHOT
" [facility] Emergency Department    [address] [facility] [address] [facility] [address]    Phone:  [phone]    Fax:  [phone]                                       Donine Ernandez   MRN: [id number]    Department:  [facility] Emergency Department   Date of Visit:  4/30/2017           Patient Information     Date Of Birth          1955        Your diagnoses for this visit were:     Cough     Chest congestion     Throat pain        You were seen by Mariusz Rico MD.        Discharge Instructions         Viral Syndrome (Adult)  A viral illness may cause a number of symptoms. The symptoms depend on the part of the body that the virus affects. If it settles in the nose, throat, and lungs, it may cause cough, sore throat, congestion, and sometimes headache. If it settles in the stomach and intestinal tract, it may cause vomiting and diarrhea. Sometimes it causes vague symptoms like \"aching all over,\" feeling tired, loss of appetite, or fever.  A viral illness usually lasts 1 to 2 weeks, but sometimes it lasts longer. In some cases, a more serious infection can look like a viral syndrome in the first few days of the illness. You may need another exam and additional tests to know the difference. Watch for the warning signs listed below.  Home care  Follow these guidelines for taking care of yourself at home:    If symptoms are severe, rest at home for the first 2 to 3 days.    Stay away from cigarette smoke - both your smoke and the smoke from others.    You may use over-the-counter medication acetaminophen or ibuprofen for fever, muscle aching, and headache, unless another medicine was prescribed for this. If you have chronic liver or kidney disease or ever had a stomach ulcer or GI bleeding, talk with your doctor before using these medicines . No one who is younger than 18 and ill with a fever should take aspirin. It may cause severe disease or death liver damage .    Your appetite may be poor, so a " light diet is fine. Avoid dehydration by drinking 8 to 12 8-ounce glasses of fluids each day. This may include water; orange juice; lemonade; apple, grape, and cranberry juice; clear fruit drinks; electrolyte replacement and sports drinks; and decaffeinated teas and coffee. If you have been diagnosed with a kidney disease, ask your doctor how much and what types of fluids you should drink to prevent dehydration. If you have kidney disease, drinking too much fluid can cause it build up in the your body and be dangerous to your health.    Over-the-counter remedies won't shorten the length of the illness but may be helpful for cough, sore throat; and nasal and sinus congestion. Don't use decongestants if you have high blood pressure.  Follow-up care  Follow up with your health care provider if you do not improve over the next week.  When to seek medical advice  Call your healthcare provider right away if any of these occur:    Cough with lots of colored sputum (mucus) or blood in your sputum    Chest pain, shortness of breath, wheezing, or difficulty breathing    Severe headache; face, neck, or ear pain    Severe, constant pain in the lower right side of your belly (abdominal)    Continued vomiting (can t keep liquids down)    Frequent diarrhea (more than 5 times a day); blood (red or black color) or mucus in diarrhea    Feeling weak, dizzy, or like you are going to faint    Extreme thirst    Fever of 100.4 F (38 C) or higher, or as directed by your healthcare provider  Call 911  Get emergency medical care if any of the following occur:    Convulsion    Feeling weak, dizzy, or like you are going to faint    Chest pain, shortness of breath, wheezing, or difficulty breathing    7502-1340 [employer]. [address] [place] [address]. All rights reserved. This information is not intended as a substitute for professional medical care. Always follow your healthcare professional's  instructions.          24 Hour Appointment Hotline       To make an appointment at any [facility], call [phone] ([phone]). If you don't have a family doctor or clinic, we will help you find one. [facility] clinics are conveniently located to serve the needs of you and your family.             Review of your medicines      Our records show that you are taking the medicines listed below. If these are incorrect, please call your family doctor or clinic.        Dose / Directions Last dose taken    albuterol 108 (90 BASE) MCG/ACT Inhaler   Commonly known as:  albuterol   Dose:  2 puff   Quantity:  1 Inhaler        Inhale 2 puffs into the lungs every 4 hours as needed for shortness of breath / dyspnea Needs appt before next refill   Refills:  0        amoxicillin-clavulanate 875-125 MG per tablet   Commonly known as:  AUGMENTIN   Dose:  1 tablet   Quantity:  20 tablet        Take 1 tablet by mouth 2 times daily   Refills:  0        fluticasone 50 MCG/ACT spray   Commonly known as:  FLONASE   Dose:  1 spray   Quantity:  1 Bottle        Spray 1 spray into both nostrils 2 times daily   Refills:  11        guaiFENesin-codeine 100-10 MG/5ML Soln solution   Commonly known as:  ROBITUSSIN AC   Dose:  1 tsp.   Quantity:  120 mL        Take 5 mLs by mouth every 4 hours as needed for cough   Refills:  0        losartan 50 MG tablet   Commonly known as:  COZAAR   Dose:  50 mg   Quantity:  90 tablet        Take 1 tablet (50 mg) by mouth daily   Refills:  3        metoprolol 50 MG tablet   Commonly known as:  LOPRESSOR   Dose:  50 mg   Quantity:  180 tablet        Take 1 tablet (50 mg) by mouth 2 times daily   Refills:  3        * order for DME   Quantity:  1        C-Pap supplies. Mask of choice.   Refills:  1 year        * order for DME        CPAP: 6 cm H2O  CPAP mask fitting  Lifetime need and heated humidity.   Refills:  0        pregabalin 50 MG capsule   Commonly known as:  LYRICA   Dose:  50 mg   Quantity:  60  capsule        Take 1 capsule (50 mg) by mouth 2 times daily   Refills:  1        TYLENOL EXTRA STRENGTH PO        Take by mouth 2 times daily   Refills:  0        zolpidem 12.5 MG CR tablet   Commonly known as:  AMBIEN CR   Dose:  12.5 mg   Quantity:  30 tablet        Take 1 tablet (12.5 mg) by mouth nightly as needed for sleep   Refills:  5        * Notice:  This list has 2 medication(s) that are the same as other medications prescribed for you. Read the directions carefully, and ask your doctor or other care provider to review them with you.            Procedures and tests performed during your visit     Beta strep group A culture    Influenza A/B antigen    Rapid Strep Screen      Orders Needing Specimen Collection     None      Pending Results     Date and Time Order Name Status Description    4/30/2017 1125 Beta strep group A culture In process             Pending Culture Results     Date and Time Order Name Status Description    4/30/2017 1125 Beta strep group A culture In process             Test Results From Your Hospital Stay        4/30/2017 12:00 PM      Component Results     Component    Specimen Description    Throat    Rapid Strep A Screen    NEGATIVE: No Group A streptococcal antigen detected by immunoassay, await   culture report.      Micro Report Status    FINAL 04/30/2017 4/30/2017 12:06 PM      Component Results     Component Value Ref Range & Units Status    Influenza A/B Agn Specimen Nasopharyngeal  Final    Influenza A Negative NEG Final    Influenza B  NEG Final    Negative   Test results must be correlated with clinical data. If necessary, results   should be confirmed by a molecular assay or viral culture.           4/30/2017 12:01 PM                Thank you for choosing [facility]       Thank you for choosing [facility] for your care. Our goal is always to provide you with excellent care. Hearing back from our patients is one way we can continue to improve our services. Please take a  "few minutes to complete the written survey that you may receive in the mail after you visit with us. Thank you!        [facility] [facility] Information     [employer] lets you send messages to your doctor, view your test results, renew your prescriptions, schedule appointments and more. To sign up, go to [email or web address] . Click on \"Log in\" on the left side of the screen, which will take you to the Welcome page. Then click on \"Sign up Now\" on the right side of the page.     You will be asked to enter the access code listed below, as well as some personal information. Please follow the directions to create your username and password.     Your access code is: [email or web address]  Expires: 7/3/2017  9:33 AM     Your access code will  in 90 days. If you need help or a new code, please call your [facility] clinic or [phone].        Care EveryWhere ID     This is your Care EveryWhere ID. This could be used by other organizations to access your [facility] medical records  [id number]        After Visit Summary       This is your record. Keep this with you and show to your community pharmacist(s) and doctor(s) at your next visit.                  "

## 2017-04-30 NOTE — DISCHARGE INSTRUCTIONS
"  Viral Syndrome (Adult)  A viral illness may cause a number of symptoms. The symptoms depend on the part of the body that the virus affects. If it settles in the nose, throat, and lungs, it may cause cough, sore throat, congestion, and sometimes headache. If it settles in the stomach and intestinal tract, it may cause vomiting and diarrhea. Sometimes it causes vague symptoms like \"aching all over,\" feeling tired, loss of appetite, or fever.  A viral illness usually lasts 1 to 2 weeks, but sometimes it lasts longer. In some cases, a more serious infection can look like a viral syndrome in the first few days of the illness. You may need another exam and additional tests to know the difference. Watch for the warning signs listed below.  Home care  Follow these guidelines for taking care of yourself at home:    If symptoms are severe, rest at home for the first 2 to 3 days.    Stay away from cigarette smoke - both your smoke and the smoke from others.    You may use over-the-counter medication acetaminophen or ibuprofen for fever, muscle aching, and headache, unless another medicine was prescribed for this. If you have chronic liver or kidney disease or ever had a stomach ulcer or GI bleeding, talk with your doctor before using these medicines . No one who is younger than 18 and ill with a fever should take aspirin. It may cause severe disease or death liver damage .    Your appetite may be poor, so a light diet is fine. Avoid dehydration by drinking 8 to 12 8-ounce glasses of fluids each day. This may include water; orange juice; lemonade; apple, grape, and cranberry juice; clear fruit drinks; electrolyte replacement and sports drinks; and decaffeinated teas and coffee. If you have been diagnosed with a kidney disease, ask your doctor how much and what types of fluids you should drink to prevent dehydration. If you have kidney disease, drinking too much fluid can cause it build up in the your body and be dangerous to " your health.    Over-the-counter remedies won't shorten the length of the illness but may be helpful for cough, sore throat; and nasal and sinus congestion. Don't use decongestants if you have high blood pressure.  Follow-up care  Follow up with your health care provider if you do not improve over the next week.  When to seek medical advice  Call your healthcare provider right away if any of these occur:    Cough with lots of colored sputum (mucus) or blood in your sputum    Chest pain, shortness of breath, wheezing, or difficulty breathing    Severe headache; face, neck, or ear pain    Severe, constant pain in the lower right side of your belly (abdominal)    Continued vomiting (can t keep liquids down)    Frequent diarrhea (more than 5 times a day); blood (red or black color) or mucus in diarrhea    Feeling weak, dizzy, or like you are going to faint    Extreme thirst    Fever of 100.4 F (38 C) or higher, or as directed by your healthcare provider  Call 911  Get emergency medical care if any of the following occur:    Convulsion    Feeling weak, dizzy, or like you are going to faint    Chest pain, shortness of breath, wheezing, or difficulty breathing    8735-2227 [employer]. [address] [place] [address], [place] [address]. All rights reserved. This information is not intended as a substitute for professional medical care. Always follow your healthcare professional's instructions.

## 2017-04-30 NOTE — ED PROVIDER NOTES
Chief Complaint:   Chief Complaint   Patient presents with     Pharyngitis         HPI:     Donnie Ernandez is a 61 year old male who presents to the ED with cough, sinus congestion, in addition to chills and sore throat which began during the overnight hours.  Patient awoke with the symptoms.  His recent history is significant for clinic visit in which the patient was diagnosed with viral sinusitis, however patient did not improve, and this was prescribed Augmentin.  However, patient had stomach upset which was severe, causing the patient to stop taking the Augmentin.  He improved, despite taking antibiotics, and was feeling much better over the past few days, however then last night developed the symptoms of cough, clear rhinorrhea, sore throat, chills, and body aches.  No other ill exposures.  No other changes in medications.  No traveling.  No chest pain, or abdominal pain.  No nausea, or vomiting.  No rash.       Medications:   Current Outpatient Prescriptions   Medication Sig Dispense Refill     amoxicillin-clavulanate (AUGMENTIN) 875-125 MG per tablet Take 1 tablet by mouth 2 times daily 20 tablet 0     guaiFENesin-codeine (ROBITUSSIN AC) 100-10 MG/5ML SOLN solution Take 5 mLs by mouth every 4 hours as needed for cough 120 mL 0     fluticasone (FLONASE) 50 MCG/ACT spray Spray 1 spray into both nostrils 2 times daily 1 Bottle 11     albuterol (ALBUTEROL) 108 (90 BASE) MCG/ACT Inhaler Inhale 2 puffs into the lungs every 4 hours as needed for shortness of breath / dyspnea Needs appt before next refill 1 Inhaler 0     losartan (COZAAR) 50 MG tablet Take 1 tablet (50 mg) by mouth daily 90 tablet 3     Acetaminophen (TYLENOL EXTRA STRENGTH PO) Take by mouth 2 times daily       pregabalin (LYRICA) 50 MG capsule Take 1 capsule (50 mg) by mouth 2 times daily 60 capsule 1     zolpidem (AMBIEN CR) 12.5 MG CR tablet Take 1 tablet (12.5 mg) by mouth nightly as needed for sleep 30 tablet 5     metoprolol (LOPRESSOR) 50 MG  tablet Take 1 tablet (50 mg) by mouth 2 times daily 180 tablet 3     ORDER FOR DME CPAP:  6 cm H2O    CPAP mask fitting    Lifetime need and heated humidity.          ORDER FOR DME C-Pap supplies. Mask of choice. 1 1 year       Allergies:   Allergies   Allergen Reactions     Lisinopril Diarrhea and Cough     Advil [Alkylamines] GI Disturbance     Augmentin GI Disturbance     Stomach ache     Prednisone      Insomnia  Facial flushing  sweating     Sulfa Drugs Hives       Medications updated and reviewed.  Past, family and surgical history is updated and reviewed in the record.     Review of Systems:  General: see HPI  HENT: see HPI  Skin: see HPI    Physical Exam:   /83  Pulse 61  Temp 98  F (36.7  C) (Temporal)  Resp 18  Wt 81.6 kg (180 lb)  SpO2 97%  BMI 29.05 kg/m2   General: Patient is well nourished, well developed, well groomed and in no acute distress  Ears: negative, External ears normal. Canals clear. TM's normal.  Nose: no drainage.  Mouth/Throat: erythematous and NO vesicular lesions.  Trismus is not present. Muffled voice is not present. Uvular shift is not present.   Neck: Neck supple. No adenopathy.    Chest/Pulmonary: clear to auscultation    Labs:  Results for orders placed or performed during the hospital encounter of 04/30/17 (from the past 24 hour(s))   Rapid Strep Screen   Result Value Ref Range    Specimen Description Throat     Rapid Strep A Screen       NEGATIVE: No Group A streptococcal antigen detected by immunoassay, await   culture report.      Micro Report Status FINAL 04/30/2017    Influenza A/B antigen   Result Value Ref Range    Influenza A/B Agn Specimen Nasopharyngeal     Influenza A Negative NEG    Influenza B  NEG     Negative   Test results must be correlated with clinical data. If necessary, results   should be confirmed by a molecular assay or viral culture.            Medical Decision Making:  Sore throat with no exam findings to suggest peritonsillar abscess.  P.      Patient with symptoms initially with viral sinusitis, however didn't take antibiotics treat the symptoms did not improve.  No recent antibiotics.  Rapid strep, in addition influenza tests are negative.  Likely viral etiology of symptoms, and does have only approximately 1 day worth of symptoms.  Not likely bacterial cause of sinusitis.  No indication for additional antibiotic.  We did add Augmentin to his list of medication intolerances.      Assessment:  1. Cough    2. Chest congestion    3. Throat pain          Plan:   We will treat symptoms of pharyngitis and antibiotics are not indicated.    He was advised to gargle with warm salt water 4 times a day and try to drink as much fluid as possible. Use acetaminophen, ibuprofen for discomfort. Return to the ER with increased pain, inability to swallow fluids, fever, rash or any concerns.     Condition on disposition: Stable             Mariusz Rico MD  04/30/17 2701

## 2017-05-02 ENCOUNTER — OFFICE VISIT (OUTPATIENT)
Dept: FAMILY MEDICINE | Facility: CLINIC | Age: 62
End: 2017-05-02
Payer: COMMERCIAL

## 2017-05-02 VITALS
HEIGHT: 66 IN | OXYGEN SATURATION: 96 % | HEART RATE: 58 BPM | SYSTOLIC BLOOD PRESSURE: 136 MMHG | TEMPERATURE: 98.9 F | DIASTOLIC BLOOD PRESSURE: 69 MMHG | BODY MASS INDEX: 29.18 KG/M2 | WEIGHT: 181.6 LBS

## 2017-05-02 DIAGNOSIS — J02.9 ACUTE PHARYNGITIS, UNSPECIFIED ETIOLOGY: Primary | ICD-10-CM

## 2017-05-02 LAB
BACTERIA SPEC CULT: NORMAL
BASOPHILS # BLD AUTO: 0.1 10E9/L (ref 0–0.2)
BASOPHILS NFR BLD AUTO: 0.5 %
DIFFERENTIAL METHOD BLD: ABNORMAL
EOSINOPHIL # BLD AUTO: 1.5 10E9/L (ref 0–0.7)
EOSINOPHIL NFR BLD AUTO: 7.7 %
ERYTHROCYTE [DISTWIDTH] IN BLOOD BY AUTOMATED COUNT: 13.9 % (ref 10–15)
HCT VFR BLD AUTO: 41.2 % (ref 40–53)
HETEROPH AB SER QL: NEGATIVE
HGB BLD-MCNC: 13.7 G/DL (ref 13.3–17.7)
IMM GRANULOCYTES # BLD: 0.1 10E9/L (ref 0–0.4)
IMM GRANULOCYTES NFR BLD: 0.3 %
LYMPHOCYTES # BLD AUTO: 2.9 10E9/L (ref 0.8–5.3)
LYMPHOCYTES NFR BLD AUTO: 15.4 %
MCH RBC QN AUTO: 30.8 PG (ref 26.5–33)
MCHC RBC AUTO-ENTMCNC: 33.3 G/DL (ref 31.5–36.5)
MCV RBC AUTO: 93 FL (ref 78–100)
MICRO REPORT STATUS: NORMAL
MONOCYTES # BLD AUTO: 2.1 10E9/L (ref 0–1.3)
MONOCYTES NFR BLD AUTO: 10.9 %
NEUTROPHILS # BLD AUTO: 12.3 10E9/L (ref 1.6–8.3)
NEUTROPHILS NFR BLD AUTO: 65.2 %
PLATELET # BLD AUTO: 472 10E9/L (ref 150–450)
RBC # BLD AUTO: 4.45 10E12/L (ref 4.4–5.9)
SPECIMEN SOURCE: NORMAL
WBC # BLD AUTO: 18.8 10E9/L (ref 4–11)

## 2017-05-02 PROCEDURE — 85025 COMPLETE CBC W/AUTO DIFF WBC: CPT | Performed by: FAMILY MEDICINE

## 2017-05-02 PROCEDURE — 36415 COLL VENOUS BLD VENIPUNCTURE: CPT | Performed by: FAMILY MEDICINE

## 2017-05-02 PROCEDURE — 86308 HETEROPHILE ANTIBODY SCREEN: CPT | Performed by: FAMILY MEDICINE

## 2017-05-02 PROCEDURE — 99213 OFFICE O/P EST LOW 20 MIN: CPT | Performed by: FAMILY MEDICINE

## 2017-05-02 RX ORDER — DOXYCYCLINE 100 MG/1
100 CAPSULE ORAL 2 TIMES DAILY
Qty: 20 CAPSULE | Refills: 0 | Status: SHIPPED | OUTPATIENT
Start: 2017-05-02 | End: 2017-05-03
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
## 2017-05-02 NOTE — PATIENT INSTRUCTIONS
Will wait for final throat culture report before making final recommendations.  Go to [facility] now for your blood draw.  You will be contacted later today for results of your lab tests.    Acetaminophen 325 mg orally 1-2 tabs every 4-6 hrs as needed for pain/fever    Self-Care for Sore Throats  Sore throats occur for many reasons, such as colds, allergies, and infections caused by viruses or bacteria. In any case, your throat becomes red and sore. Your goal for self-care is to reduce your discomfort while giving your throat a chance to heal.    Moisten and Soothe Your Throat    Try a sip of water first thing after waking up.    Keep your throat moist by drinking 6 or more glasses of clear liquids every day.    Run a cool-air humidifier in your room overnight.    Avoid cigarette smoke.     Suck on throat lozenges, cough drops, hard candy, ice chips, or frozen fruit-juice bars. Use the sugar-free versions if your diet or medical condition require them.  Gargle to Ease Irritation  Gargling every hour or 2 can ease irritation. Try gargling with 1 of these solutions:    1/4 teaspoon of salt in 1/2 cup of warm water    An over-the-counter anesthetic gargle  Use Medication for More Relief  Over-the-counter medication can reduce sore throat symptoms. Ask your pharmacist if you have questions about which medication to use:    Ease pain with anesthetic sprays. Aspirin or an aspirin substitute also helps. Remember, never give aspirin to anyone 18 or younger, or if you are already taking blood thinners.     For sore throats caused by allergies, try antihistamines to block the allergic reaction.    Remember: unless a sore throat is caused by a bacterial infection, antibiotics won t help you.  Prevent Future Sore Throats    Stop smoking or reduce contact with secondhand smoke. Smoke irritates the tender throat lining.    Limit contact with pets and with allergy-causing substances, such as pollen and mold.    When you re around  someone with a sore throat or cold, wash your hands frequently to keep viruses or bacteria from spreading.    Don t strain your vocal cords.  Call Your Health Care Provider  Contact your doctor if you have:    A temperature over 101 F (38.3 C)    White spots on the throat    Great difficulty swallowing    Trouble breathing    A skin rash    Recent exposure to someone else with strep bacteria    Severe hoarseness and swollen glands in the neck or jaw     3818-0669 [employer]. [address] [place] [address], [place] [id number]. All rights reserved. This information is not intended as a substitute for professional medical care. Always follow your healthcare professional's instructions.

## 2017-05-02 NOTE — NURSING NOTE
"Chief Complaint   Patient presents with     ER F/U     Pt here for er f/u for cough,st.       Initial /70  Pulse 59  Temp 98.9  F (37.2  C) (Tympanic)  Ht 5' 6\" (1.676 m)  Wt 181 lb 9.6 oz (82.4 kg)  BMI 29.31 kg/m2 Estimated body mass index is 29.31 kg/(m^2) as calculated from the following:    Height as of this encounter: 5' 6\" (1.676 m).    Weight as of this encounter: 181 lb 9.6 oz (82.4 kg).  Medication Reconciliation: complete  Danyelle García CMA    "

## 2017-05-02 NOTE — MR AVS SNAPSHOT
After Visit Summary   5/2/2017    Donnie Ernandez    MRN: [id number]           Patient Information     Date Of Birth          1955        Visit Information        Provider Department      5/2/2017 7:00 AM Sudhir Fraga MD [facility]        Today's Diagnoses     Acute pharyngitis, unspecified etiology    -  1      Care Instructions    Will wait for final throat culture report before making final recommendations.  Go to [facility] now for your blood draw.  You will be contacted later today for results of your lab tests.    Acetaminophen 325 mg orally 1-2 tabs every 4-6 hrs as needed for pain/fever    Self-Care for Sore Throats  Sore throats occur for many reasons, such as colds, allergies, and infections caused by viruses or bacteria. In any case, your throat becomes red and sore. Your goal for self-care is to reduce your discomfort while giving your throat a chance to heal.    Moisten and Soothe Your Throat    Try a sip of water first thing after waking up.    Keep your throat moist by drinking 6 or more glasses of clear liquids every day.    Run a cool-air humidifier in your room overnight.    Avoid cigarette smoke.     Suck on throat lozenges, cough drops, hard candy, ice chips, or frozen fruit-juice bars. Use the sugar-free versions if your diet or medical condition require them.  Gargle to Ease Irritation  Gargling every hour or 2 can ease irritation. Try gargling with 1 of these solutions:    1/4 teaspoon of salt in 1/2 cup of warm water    An over-the-counter anesthetic gargle  Use Medication for More Relief  Over-the-counter medication can reduce sore throat symptoms. Ask your pharmacist if you have questions about which medication to use:    Ease pain with anesthetic sprays. Aspirin or an aspirin substitute also helps. Remember, never give aspirin to anyone 18 or younger, or if you are already taking blood thinners.     For sore throats caused by allergies, try  antihistamines to block the allergic reaction.    Remember: unless a sore throat is caused by a bacterial infection, antibiotics won t help you.  Prevent Future Sore Throats    Stop smoking or reduce contact with secondhand smoke. Smoke irritates the tender throat lining.    Limit contact with pets and with allergy-causing substances, such as pollen and mold.    When you re around someone with a sore throat or cold, wash your hands frequently to keep viruses or bacteria from spreading.    Don t strain your vocal cords.  Call Your Health Care Provider  Contact your doctor if you have:    A temperature over 101 F (38.3 C)    White spots on the throat    Great difficulty swallowing    Trouble breathing    A skin rash    Recent exposure to someone else with strep bacteria    Severe hoarseness and swollen glands in the neck or jaw     1050-6007 [employer]. [address] [place] [address]. All rights reserved. This information is not intended as a substitute for professional medical care. Always follow your healthcare professional's instructions.              Follow-ups after your visit        Follow-up notes from your care team     Return if symptoms worsen or fail to improve.      Who to contact     If you have questions or need follow up information about today's clinic visit or your schedule please contact [facility] directly at [phone].  Normal or non-critical lab and imaging results will be communicated to you by [employer], letter or phone within 4 business days after the clinic has received the results. If you do not hear from us within 7 days, please contact the clinic through MyChart or phone. If you have a critical or abnormal lab result, we will notify you by phone as soon as possible.  Submit refill requests through [employer] or call your pharmacy and they will forward the refill request to us. Please allow 3 business days for your refill to be completed.           "Additional Information About Your Visit        MyChart Information     [employer] lets you send messages to your doctor, view your test results, renew your prescriptions, schedule appointments and more. To sign up, go to [email or web address] . Click on \"Log in\" on the left side of the screen, which will take you to the Welcome page. Then click on \"Sign up Now\" on the right side of the page.     You will be asked to enter the access code listed below, as well as some personal information. Please follow the directions to create your username and password.     Your access code is: [email or web address]  Expires: 7/3/2017  9:33 AM     Your access code will  in 90 days. If you need help or a new code, please call your [facility] clinic or [phone].        Care EveryWhere ID     This is your Care EveryWhere ID. This could be used by other organizations to access your [facility] medical records  [id number]        Your Vitals Were     Pulse Temperature Height Pulse Oximetry BMI (Body Mass Index)       58 98.9  F (37.2  C) (Tympanic) 5' 6\" (1.676 m) 96% 29.31 kg/m2        Blood Pressure from Last 3 Encounters:   17 136/69   17 151/83   17 150/74    Weight from Last 3 Encounters:   17 181 lb 9.6 oz (82.4 kg)   17 180 lb (81.6 kg)   17 186 lb 3.2 oz (84.5 kg)              We Performed the Following     CBC with platelets differential     Mononucleosis screen          Today's Medication Changes          These changes are accurate as of: 17  7:38 AM.  If you have any questions, ask your nurse or doctor.               Stop taking these medicines if you haven't already. Please contact your care team if you have questions.     amoxicillin-clavulanate 875-125 MG per tablet   Commonly known as:  AUGMENTIN   Stopped by:  Sudhir Fraga MD                    Primary Care Provider Office Phone # Fax #    Mae MAYANK Zhou [facility] [phone]       [facility] " [facility] [address] [facility] [address]        Thank you!     Thank you for choosing [facility]  for your care. Our goal is always to provide you with excellent care. Hearing back from our patients is one way we can continue to improve our services. Please take a few minutes to complete the written survey that you may receive in the mail after your visit with us. Thank you!             Your Updated Medication List - Protect others around you: Learn how to safely use, store and throw away your medicines at www.disposemymeds.org.          This list is accurate as of: 5/2/17  7:38 AM.  Always use your most recent med list.                   Brand Name Dispense Instructions for use    albuterol 108 (90 BASE) MCG/ACT Inhaler    albuterol    1 Inhaler    Inhale 2 puffs into the lungs every 4 hours as needed for shortness of breath / dyspnea Needs appt before next refill       fluticasone 50 MCG/ACT spray    FLONASE    1 Bottle    Spray 1 spray into both nostrils 2 times daily       losartan 50 MG tablet    COZAAR    90 tablet    Take 1 tablet (50 mg) by mouth daily       metoprolol 50 MG tablet    LOPRESSOR    180 tablet    Take 1 tablet (50 mg) by mouth 2 times daily       * order for DME     1    C-Pap supplies. Mask of choice.       * order for DME      CPAP: 6 cm H2O  CPAP mask fitting  Lifetime need and heated humidity.       TYLENOL EXTRA STRENGTH PO      Take by mouth 2 times daily       zolpidem 12.5 MG CR tablet    AMBIEN CR    30 tablet    Take 1 tablet (12.5 mg) by mouth nightly as needed for sleep       * Notice:  This list has 2 medication(s) that are the same as other medications prescribed for you. Read the directions carefully, and ask your doctor or other care provider to review them with you.

## 2017-05-03 RX ORDER — DOXYCYCLINE 100 MG/1
100 CAPSULE ORAL 2 TIMES DAILY
Qty: 20 CAPSULE | Refills: 0 | Status: SHIPPED | OUTPATIENT
Start: 2017-05-03 | End: 2017-11-20

## 2017-05-03 NOTE — NURSING NOTE
Notes Recorded by Charleen Salinas CMA on 5/3/2017 at 9:33 AM  Spoke with patient about results. Asked if his prescription can be sent to [facility].  Charleen Salinas MA    This has been done.   Юлия Briscoe RNC

## 2017-05-19 DIAGNOSIS — G47.00 INSOMNIA, UNSPECIFIED: ICD-10-CM

## 2017-05-19 NOTE — TELEPHONE ENCOUNTER
Zolpidem    Last Written Prescription Date: 10/18/16  Last Fill Quantity: 30,  # refills: 5   Last Office Visit with [facility], [facility] or [facility] prescribing provider: 05/02/17

## 2017-05-22 RX ORDER — ZOLPIDEM TARTRATE 12.5 MG/1
12.5 TABLET, FILM COATED, EXTENDED RELEASE ORAL
Qty: 30 TABLET | Refills: 5 | Status: SHIPPED | OUTPATIENT
Start: 2017-05-22 | End: 2017-12-11

## 2017-07-10 ENCOUNTER — TELEPHONE (OUTPATIENT)
Dept: FAMILY MEDICINE | Facility: CLINIC | Age: 62
End: 2017-07-10

## 2017-07-10 DIAGNOSIS — M51.26 HERNIATION OF INTERVERTEBRAL DISC BETWEEN L4 AND L5: Primary | ICD-10-CM

## 2017-07-10 NOTE — TELEPHONE ENCOUNTER
Reason for Call: Request for an order or referral:    Order or referral being requested: Back TIM     Date needed: as soon as possible    Has the patient been seen by the PCP for this problem? YES    Additional comments: pt last order was over a year ago, last office visit was 5/2/17    Phone number Patient can be reached at:  Cell number on file:    Telephone Information:   Mobile [phone]       Best Time:  Any     Can we leave a detailed message on this number?  YES    Call taken on 7/10/2017 at 7:46 AM by Sakina Marr

## 2017-07-10 NOTE — TELEPHONE ENCOUNTER
Patient requesting order for TIM.  Last injection was last year.  Order pended.  It looks like he hasn't been seen for his back pain in clinic for over a year and has been seen by ortho in the past.  Appointment in clinic?    Fernanda Teixeira RN

## 2017-07-11 ENCOUNTER — OFFICE VISIT (OUTPATIENT)
Dept: FAMILY MEDICINE | Facility: CLINIC | Age: 62
End: 2017-07-11
Payer: COMMERCIAL

## 2017-07-11 VITALS
HEIGHT: 66 IN | HEART RATE: 84 BPM | BODY MASS INDEX: 28.61 KG/M2 | DIASTOLIC BLOOD PRESSURE: 88 MMHG | SYSTOLIC BLOOD PRESSURE: 147 MMHG | TEMPERATURE: 98.8 F | WEIGHT: 178 LBS

## 2017-07-11 DIAGNOSIS — K52.9 CHRONIC DIARRHEA: ICD-10-CM

## 2017-07-11 DIAGNOSIS — R42 DIZZINESS: ICD-10-CM

## 2017-07-11 DIAGNOSIS — K52.9 CHRONIC DIARRHEA: Primary | ICD-10-CM

## 2017-07-11 LAB
ALBUMIN SERPL-MCNC: 4.1 G/DL (ref 3.4–5)
ALP SERPL-CCNC: 76 U/L (ref 40–150)
ALT SERPL W P-5'-P-CCNC: 39 U/L (ref 0–70)
ANION GAP SERPL CALCULATED.3IONS-SCNC: 7 MMOL/L (ref 3–14)
AST SERPL W P-5'-P-CCNC: 32 U/L (ref 0–45)
BILIRUB SERPL-MCNC: 1 MG/DL (ref 0.2–1.3)
BUN SERPL-MCNC: 15 MG/DL (ref 7–30)
C DIFF TOX B STL QL: NORMAL
CALCIUM SERPL-MCNC: 9 MG/DL (ref 8.5–10.1)
CHLORIDE SERPL-SCNC: 105 MMOL/L (ref 94–109)
CO2 SERPL-SCNC: 25 MMOL/L (ref 20–32)
CREAT SERPL-MCNC: 0.83 MG/DL (ref 0.66–1.25)
GFR SERPL CREATININE-BSD FRML MDRD: ABNORMAL ML/MIN/1.7M2
GLUCOSE SERPL-MCNC: 100 MG/DL (ref 70–99)
POTASSIUM SERPL-SCNC: 4.1 MMOL/L (ref 3.4–5.3)
PROT SERPL-MCNC: 8 G/DL (ref 6.8–8.8)
SODIUM SERPL-SCNC: 137 MMOL/L (ref 133–144)
SPECIMEN SOURCE: NORMAL

## 2017-07-11 PROCEDURE — 36415 COLL VENOUS BLD VENIPUNCTURE: CPT | Performed by: FAMILY MEDICINE

## 2017-07-11 PROCEDURE — 80053 COMPREHEN METABOLIC PANEL: CPT | Performed by: FAMILY MEDICINE

## 2017-07-11 PROCEDURE — 87506 IADNA-DNA/RNA PROBE TQ 6-11: CPT | Performed by: FAMILY MEDICINE

## 2017-07-11 PROCEDURE — 99214 OFFICE O/P EST MOD 30 MIN: CPT | Performed by: FAMILY MEDICINE

## 2017-07-11 PROCEDURE — 87209 SMEAR COMPLEX STAIN: CPT | Performed by: FAMILY MEDICINE

## 2017-07-11 PROCEDURE — 87493 C DIFF AMPLIFIED PROBE: CPT | Performed by: FAMILY MEDICINE

## 2017-07-11 PROCEDURE — 87177 OVA AND PARASITES SMEARS: CPT | Performed by: FAMILY MEDICINE

## 2017-07-11 NOTE — PATIENT INSTRUCTIONS
Thank you for choosing [facility].  You may be receiving a survey in the mail from [employer] regarding your visit today.  Please take a few minutes to complete and return the survey to let us know how we are doing.      If you have questions or concerns, please contact us via [employer] or you can contact your care team at [phone].    Our Clinic hours are:  Monday 6:40 am  to 7:00 pm  Tuesday -Friday 6:40 am to 5:00 pm    The [place] outpatient lab hours are:  Monday - Friday 6:10 am to 4:45 pm  Saturdays 7:00 am to 11:00 am  Appointments are required, call [phone]    If you have clinical questions after hours or would like to schedule an appointment,  call the clinic at [phone].  Uncertain Causes of Diarrhea (Adult)    Diarrhea is when stools are loose and watery. This can be caused by:    Viral infections    Bacterial infections    Food poisoning    Parasites    Irritable bowel syndrome (IBS)    Inflammatory bowel diseases such as ulcerative colitis, Crohn's disease, and celiac disease    Food intolerance, such as to lactose, the sugar found in milk and milk products    Reaction to medicines like antibiotics, laxatives, cancer drugs, and antacids  Along with diarrhea, you may also have:    Abdominal pain and cramping    Nausea and vomiting    Loss of bowel control    Fever and chills    Bloody stools  In some cases, antibiotics may help to treat diarrhea. You may have a stool sample test. This is done to see what is causing your diarrhea, and if antibiotics will help treat it. The results of a stool sample test may take up to 2 days. The healthcare provider may not give you antibiotics until he or she has the stool test results.  Diarrhea can cause dehydration. This is the loss of too much water and other fluids from the body. When this occurs, body fluid must be replaced. This can be done with oral rehydration solutions. Oral rehydration solutions are available at drugstores and grocery  stores without a prescription.  Home care  Follow all instructions given by your healthcare provider. Rest at home for the next 24 hours, or until you feel better. Avoid caffeine, tobacco, and alcohol. These can make diarrhea, cramping, and pain worse.  If taking medicines:    Don t take over-the-counter diarrhea or nausea medicines unless your healthcare provider tells you to.    You may use acetaminophen or NSAID medicines like ibuprofen or naproxen to reduce pain and fever. Don t use these if you have chronic liver or kidney disease, or ever had a stomach ulcer or gastrointestinal bleeding. Don't use NSAID medicines if you are already taking one for another condition (like arthritis) or are on daily aspirin therapy (such as for heart disease or after a stroke). Talk with your healthcare provider first.    If antibiotics were prescribed, be sure you take them until they are finished. Don t stop taking them even when you feel better. Antibiotics must be taken as a full course.  To prevent the spread of illness:    Remember that washing with soap and water and using alcohol-based  is the best way to prevent the spread of infection.    Clean the toilet after each use.    Wash your hands before eating.    Wash your hands before and after preparing food. Keep in mind that people with diarrhea or vomiting should not prepare food for others.    Wash your hands after using cutting boards, countertops, and knives that have been in contact with raw foods.    Wash and then peel fruits and vegetables.    Keep uncooked meats away from cooked and ready-to-eat foods.    Use a food thermometer when cooking. Cook poultry to at least 165 F (74 C). Cook ground meat (beef, veal, pork, lamb) to at least 160 F (71 C). Cook fresh beef, veal, lamb, and pork to at least 145 F (63 C).    Don t eat raw or undercooked eggs (poached or jesus side up), poultry, meat, or unpasteurized milk and juices.  Food and drinks  The main goal  while treating vomiting or diarrhea is to prevent dehydration. This is done by taking small amounts of liquids often.    Keep in mind that liquids are more important than food right now.    Drink only small amounts of liquids at a time.    Don t force yourself to eat, especially if you are having cramping, vomiting, or diarrhea. Don t eat large amounts at a time, even if you are hungry.    If you eat, avoid fatty, greasy, spicy, or fried foods.    Don t eat dairy foods or drink milk if you have diarrhea. These can make diarrhea worse.  During the first 24 hours you can try:    Oral rehydration solutions. Do not use sports drinks. They have too much sugar and not enough electrolytes.    Soft drinks without caffeine    Ginger ale    Water (plain or flavored)    Decaf tea or coffee    Clear broth, consommé, or bouillon    Gelatin, popsicles, or frozen fruit juice bars  The second 24 hours, if you are feeling better, you can add:    Hot cereal, plain toast, bread, rolls, or crackers    Plain noodles, rice, mashed potatoes, chicken noodle soup, or rice soup    Unsweetened canned fruit (no pineapple)    Bananas  As you recover:    Limit fat intake to less than 15 grams per day. Don t eat margarine, butter, oils, mayonnaise, sauces, gravies, fried foods, peanut butter, meat, poultry, or fish.    Limit fiber. Don t eat raw or cooked vegetables, fresh fruits except bananas, or bran cereals.    Limit caffeine and chocolate.    Limit dairy.    Don t use spices or seasonings except salt.    Go back to your normal diet over time, as you feel better and your symptoms improve.    If the symptoms come back, go back to a simple diet or clear liquids.  Follow-up care  Follow up with your healthcare provider, or as advised. If a stool sample was taken or cultures were done, call the healthcare provider for the results as instructed.  Call 911  Call 911 if you have any of these symptoms:    Trouble breathing    Confusion    Extreme  drowsiness or trouble walking    Loss of consciousness    Rapid heart rate    Chest pain    Stiff neck    Seizure  When to seek medical advice  Call your healthcare provider right away if any of these occur:    Abdominal pain that gets worse    Constant lower right abdominal pain    Continued vomiting and inability to keep liquids down    Diarrhea more than 5 times a day    Blood in vomit or stool    Dark urine or no urine for 8 hours, dry mouth and tongue, tiredness, weakness, or dizziness    Drowsiness    New rash    You don t get better in 2 to 3 days    Fever of 100.4 F (38 C) or higher that doesn t get lower with medicine  Date Last Reviewed: 1/3/2016    5493-0478 [employer]. [address] [place] [address], [place] [address]. All rights reserved. This information is not intended as a substitute for professional medical care. Always follow your healthcare professional's instructions.

## 2017-07-11 NOTE — NURSING NOTE
"Chief Complaint   Patient presents with     Dizziness       Initial /78  Pulse 84  Temp 98.8  F (37.1  C) (Tympanic)  Ht 5' 6\" (1.676 m)  Wt 178 lb (80.7 kg)  BMI 28.73 kg/m2 Estimated body mass index is 28.73 kg/(m^2) as calculated from the following:    Height as of this encounter: 5' 6\" (1.676 m).    Weight as of this encounter: 178 lb (80.7 kg).  Medication Reconciliation: complete  "
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
## 2017-07-12 LAB
CAMPYLOBACTER GROUP BY NAT: NOT DETECTED
ENTERIC PATHOGEN COMMENT: NORMAL
MICRO REPORT STATUS: NORMAL
NOROVIRUS I AND II BY NAT: NOT DETECTED
O+P STL MICRO: NORMAL
ROTAVIRUS A BY NAT: NOT DETECTED
SALMONELLA SPECIES BY NAT: NOT DETECTED
SHIGA TOXIN 1 GENE BY NAT: NOT DETECTED
SHIGA TOXIN 2 GENE BY NAT: NOT DETECTED
SHIGELLA SP+EIEC IPAH STL QL NAA+PROBE: NOT DETECTED
SPECIMEN SOURCE: NORMAL
VIBRIO GROUP BY NAT: NOT DETECTED
YERSINIA ENTEROCOLITICA BY NAT: NOT DETECTED

## 2017-07-12 NOTE — PROGRESS NOTES
Please inform patient that all the stool test result was negative. I am thinking that the next step will be a colonoscopy. I will ask that he watch his symptoms for another week or so. He can take some imodium for the diarrhea if need be. If his diarrhea still persists after one week, then I highly recommend a colonoscopy.    Thank you.     Gumaro Pierre M.D.

## 2017-07-18 ENCOUNTER — TELEPHONE (OUTPATIENT)
Dept: FAMILY MEDICINE | Facility: CLINIC | Age: 62
End: 2017-07-18

## 2017-07-18 NOTE — TELEPHONE ENCOUNTER
Dr Pierre,   Patient inquiring about lab results from 7/11/17  Please review and advise. Thanks,   Юлия Briscoe RNC

## 2017-07-18 NOTE — TELEPHONE ENCOUNTER
Pt has been given the below information. He is able to verbalize understanding. Pt reports that he has started taking a probiotic and thinks that he is having an improvement in symptoms. He will continue to monitor and call back if diarrhea persists.     Kelley Prince RN

## 2017-07-24 ENCOUNTER — TELEPHONE (OUTPATIENT)
Dept: FAMILY MEDICINE | Facility: CLINIC | Age: 62
End: 2017-07-24

## 2017-07-24 DIAGNOSIS — I10 ESSENTIAL HYPERTENSION WITH GOAL BLOOD PRESSURE LESS THAN 140/90: ICD-10-CM

## 2017-07-24 NOTE — TELEPHONE ENCOUNTER
Pt is out of medication and wondering about getting a refill sent in.      metoprolol (LOPRESSOR) 50 MG tablet        Last Written Prescription Date: 07/25/16  Last Fill Quantity: 180, # refills: 3    Last Office Visit with G, P or [facility] prescribing provider:  07/11/17   Future Office Visit:        BP Readings from Last 3 Encounters:   07/11/17 147/88   05/02/17 136/69   04/30/17 151/83     [facility]

## 2017-07-25 RX ORDER — METOPROLOL TARTRATE 50 MG
50 TABLET ORAL 2 TIMES DAILY
Qty: 180 TABLET | Refills: 3 | Status: SHIPPED | OUTPATIENT
Start: 2017-07-25 | End: 2019-01-24

## 2017-07-25 NOTE — TELEPHONE ENCOUNTER
Patient was recently seen by Dr Pierre  BP within goal for his age.   Prescription approved per [facility] Refill Protocol. Patient advised.     Юлия Briscoe RNC

## 2017-09-21 ENCOUNTER — TELEPHONE (OUTPATIENT)
Dept: FAMILY MEDICINE | Facility: CLINIC | Age: 62
End: 2017-09-21

## 2017-09-21 ENCOUNTER — ANESTHESIA EVENT (OUTPATIENT)
Dept: GASTROENTEROLOGY | Facility: CLINIC | Age: 62
End: 2017-09-21
Payer: COMMERCIAL

## 2017-09-21 DIAGNOSIS — K52.9 CHRONIC DIARRHEA: Primary | ICD-10-CM

## 2017-09-21 NOTE — TELEPHONE ENCOUNTER
I spoke with pt.  Last colonoscopy was 5/11/11.  He is on a every 10 year schedule.    Pt was last seen for his chronic diarrhea 7/11/17.  Stools studies returned as negative.  Pt reports no new or worsened symptoms since he was last seen.  Ok to order?  Marva Degroot RN

## 2017-09-21 NOTE — TELEPHONE ENCOUNTER
Reason for call:  Patient reporting a symptom    Symptom or request: diahrrea    Duration (how long have symptoms been present): years    Have you been treated for this before? Yes    Additional comments: patient has tried   Changing  Diet   And many other changes    NOW  Requesting colonoscopy   Phone Number patient can be reached at:  Cell number on file:    Telephone Information:   Mobile [phone]       Best Time:  any  Can we leave a detailed message on this number:  YES   [facility]      Call taken on 9/21/2017 at 9:17 AM by Shelli Saravia

## 2017-09-21 NOTE — TELEPHONE ENCOUNTER
Pt informed.  He will arrange colonoscopy followed by GI consult about one week later.  Marva Degroot RN

## 2017-09-21 NOTE — TELEPHONE ENCOUNTER
I have already placed an order for a colonoscopy and GI consult. Please pass information to patient . Thanks

## 2017-09-25 ENCOUNTER — HOSPITAL ENCOUNTER (OUTPATIENT)
Facility: CLINIC | Age: 62
Discharge: HOME | End: 2017-09-25
Attending: SURGERY | Admitting: SURGERY
Payer: COMMERCIAL

## 2017-09-25 ENCOUNTER — SURGERY (OUTPATIENT)
Age: 62
End: 2017-09-25

## 2017-09-25 ENCOUNTER — ANESTHESIA (OUTPATIENT)
Dept: GASTROENTEROLOGY | Facility: CLINIC | Age: 62
End: 2017-09-25
Payer: COMMERCIAL

## 2017-09-25 VITALS
HEART RATE: 58 BPM | TEMPERATURE: 98.4 F | WEIGHT: 180 LBS | SYSTOLIC BLOOD PRESSURE: 155 MMHG | RESPIRATION RATE: 16 BRPM | OXYGEN SATURATION: 99 % | HEIGHT: 66 IN | DIASTOLIC BLOOD PRESSURE: 86 MMHG | BODY MASS INDEX: 28.93 KG/M2

## 2017-09-25 LAB — COLONOSCOPY: NORMAL

## 2017-09-25 PROCEDURE — 25000125 ZZHC RX 250: Performed by: SURGERY

## 2017-09-25 PROCEDURE — 88305 TISSUE EXAM BY PATHOLOGIST: CPT | Performed by: SURGERY

## 2017-09-25 PROCEDURE — 45380 COLONOSCOPY AND BIOPSY: CPT | Performed by: SURGERY

## 2017-09-25 PROCEDURE — 25000128 H RX IP 250 OP 636: Performed by: NURSE ANESTHETIST, CERTIFIED REGISTERED

## 2017-09-25 PROCEDURE — 25000125 ZZHC RX 250: Performed by: NURSE ANESTHETIST, CERTIFIED REGISTERED

## 2017-09-25 PROCEDURE — 88305 TISSUE EXAM BY PATHOLOGIST: CPT | Mod: 26 | Performed by: SURGERY

## 2017-09-25 PROCEDURE — 37000008 ZZH ANESTHESIA TECHNICAL FEE, 1ST 30 MIN: Performed by: SURGERY

## 2017-09-25 RX ORDER — PROPOFOL 10 MG/ML
INJECTION, EMULSION INTRAVENOUS CONTINUOUS PRN
Status: DISCONTINUED | OUTPATIENT
Start: 2017-09-25 | End: 2017-09-25

## 2017-09-25 RX ORDER — PROPOFOL 10 MG/ML
INJECTION, EMULSION INTRAVENOUS PRN
Status: DISCONTINUED | OUTPATIENT
Start: 2017-09-25 | End: 2017-09-25

## 2017-09-25 RX ORDER — GLYCOPYRROLATE 0.2 MG/ML
INJECTION, SOLUTION INTRAMUSCULAR; INTRAVENOUS PRN
Status: DISCONTINUED | OUTPATIENT
Start: 2017-09-25 | End: 2017-09-25

## 2017-09-25 RX ORDER — ONDANSETRON 2 MG/ML
4 INJECTION INTRAMUSCULAR; INTRAVENOUS
Status: DISCONTINUED | OUTPATIENT
Start: 2017-09-25 | End: 2017-09-25 | Stop reason: HOSPADM

## 2017-09-25 RX ORDER — SODIUM CHLORIDE, SODIUM LACTATE, POTASSIUM CHLORIDE, CALCIUM CHLORIDE 600; 310; 30; 20 MG/100ML; MG/100ML; MG/100ML; MG/100ML
INJECTION, SOLUTION INTRAVENOUS CONTINUOUS
Status: DISCONTINUED | OUTPATIENT
Start: 2017-09-25 | End: 2017-09-25 | Stop reason: HOSPADM

## 2017-09-25 RX ORDER — LIDOCAINE 40 MG/G
CREAM TOPICAL
Status: DISCONTINUED | OUTPATIENT
Start: 2017-09-25 | End: 2017-09-25 | Stop reason: HOSPADM

## 2017-09-25 RX ADMIN — PROPOFOL 200 MCG/KG/MIN: 10 INJECTION, EMULSION INTRAVENOUS at 12:21

## 2017-09-25 RX ADMIN — LIDOCAINE HYDROCHLORIDE 1 ML: 10 INJECTION, SOLUTION EPIDURAL; INFILTRATION; INTRACAUDAL; PERINEURAL at 12:13

## 2017-09-25 RX ADMIN — PROPOFOL 20 MG: 10 INJECTION, EMULSION INTRAVENOUS at 12:23

## 2017-09-25 RX ADMIN — PROPOFOL 20 MG: 10 INJECTION, EMULSION INTRAVENOUS at 12:20

## 2017-09-25 RX ADMIN — PROPOFOL 20 MG: 10 INJECTION, EMULSION INTRAVENOUS at 12:21

## 2017-09-25 RX ADMIN — PROPOFOL 20 MG: 10 INJECTION, EMULSION INTRAVENOUS at 12:19

## 2017-09-25 RX ADMIN — PROPOFOL 20 MG: 10 INJECTION, EMULSION INTRAVENOUS at 12:22

## 2017-09-25 RX ADMIN — SODIUM CHLORIDE, POTASSIUM CHLORIDE, SODIUM LACTATE AND CALCIUM CHLORIDE: 600; 310; 30; 20 INJECTION, SOLUTION INTRAVENOUS at 12:07

## 2017-09-25 RX ADMIN — GLYCOPYRROLATE 0.2 MG: 0.2 INJECTION, SOLUTION INTRAMUSCULAR; INTRAVENOUS at 12:20
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
## 2017-09-25 NOTE — ANESTHESIA POSTPROCEDURE EVALUATION
Patient: Donnie Ernandez    Procedure(s):  Colonoscopy - Wound Class: II-Clean Contaminated    Diagnosis:chronic diarrhea  Diagnosis Additional Information: No value filed.    Anesthesia Type:  MAC    Note:  Anesthesia Post Evaluation    Patient location during evaluation: Phase 2  Patient participation: Able to fully participate in evaluation  Level of consciousness: awake  Pain management: adequate  Airway patency: patent  Cardiovascular status: acceptable  Respiratory status: acceptable  Hydration status: acceptable  PONV: none     Anesthetic complications: None          Last vitals:  Vitals:    09/25/17 1151   BP: 145/71   Pulse: 58   Resp: 16   Temp: 36.9  C (98.4  F)   SpO2: 98%         Electronically Signed By: Tom Collado CRNA, APRN CRNA  September 25, 2017  12:36 PM

## 2017-09-25 NOTE — ANESTHESIA CARE TRANSFER NOTE
Patient: Donnie Ernandez    Procedure(s):  Colonoscopy - Wound Class: II-Clean Contaminated    Diagnosis: chronic diarrhea  Diagnosis Additional Information: No value filed.    Anesthesia Type:   MAC     Note:  Airway :Room Air  Patient transferred to:Phase II        Vitals: (Last set prior to Anesthesia Care Transfer)    CRNA VITALS  9/25/2017 1204 - 9/25/2017 1235      9/25/2017             Pulse: 72    SpO2: 96 %                Electronically Signed By: Tom Collado CRNA, APRN CRNA  September 25, 2017  12:35 PM

## 2017-09-25 NOTE — OP NOTE
Colonoscopy - normal colon with a few small diverticula and single polyp (removed).  Random biopsies taken for microscopic colitis.

## 2017-09-29 LAB — COPATH REPORT: NORMAL

## 2017-10-03 PROBLEM — D36.9 TUBULAR ADENOMA: Status: ACTIVE | Noted: 2017-10-03

## 2017-11-09 DIAGNOSIS — I10 ESSENTIAL HYPERTENSION WITH GOAL BLOOD PRESSURE LESS THAN 140/90: ICD-10-CM

## 2017-11-09 NOTE — TELEPHONE ENCOUNTER
Losartan     Last Written Prescription Date: 12/15/16  Last Fill Quantity: 90, # refills: 3  Last Office Visit with [facility], [facility] or [facility] prescribing provider: 07/11/17       Potassium   Date Value Ref Range Status   07/11/2017 4.1 3.4 - 5.3 mmol/L Final     Creatinine   Date Value Ref Range Status   07/11/2017 0.83 0.66 - 1.25 mg/dL Final     BP Readings from Last 3 Encounters:   09/25/17 155/86   07/11/17 147/88   05/02/17 136/69

## 2017-11-15 RX ORDER — LOSARTAN POTASSIUM 50 MG/1
50 TABLET ORAL DAILY
Qty: 90 TABLET | Refills: 2 | Status: SHIPPED | OUTPATIENT
Start: 2017-11-15 | End: 2018-05-08 | Stop reason: DRUGHIGH

## 2017-11-20 ENCOUNTER — OFFICE VISIT (OUTPATIENT)
Dept: FAMILY MEDICINE | Facility: CLINIC | Age: 62
End: 2017-11-20
Payer: COMMERCIAL

## 2017-11-20 VITALS
HEIGHT: 66 IN | OXYGEN SATURATION: 98 % | HEART RATE: 62 BPM | BODY MASS INDEX: 28.77 KG/M2 | WEIGHT: 179 LBS | DIASTOLIC BLOOD PRESSURE: 79 MMHG | TEMPERATURE: 98.4 F | SYSTOLIC BLOOD PRESSURE: 163 MMHG

## 2017-11-20 DIAGNOSIS — R55 SYNCOPE, UNSPECIFIED SYNCOPE TYPE: Primary | ICD-10-CM

## 2017-11-20 LAB
ANION GAP SERPL CALCULATED.3IONS-SCNC: 4 MMOL/L (ref 3–14)
BUN SERPL-MCNC: 18 MG/DL (ref 7–30)
CALCIUM SERPL-MCNC: 9 MG/DL (ref 8.5–10.1)
CHLORIDE SERPL-SCNC: 106 MMOL/L (ref 94–109)
CO2 SERPL-SCNC: 28 MMOL/L (ref 20–32)
CREAT SERPL-MCNC: 0.9 MG/DL (ref 0.66–1.25)
ERYTHROCYTE [DISTWIDTH] IN BLOOD BY AUTOMATED COUNT: 14.2 % (ref 10–15)
GFR SERPL CREATININE-BSD FRML MDRD: 86 ML/MIN/1.7M2
GLUCOSE SERPL-MCNC: 97 MG/DL (ref 70–99)
HCT VFR BLD AUTO: 38.7 % (ref 40–53)
HGB BLD-MCNC: 12.7 G/DL (ref 13.3–17.7)
MCH RBC QN AUTO: 31.6 PG (ref 26.5–33)
MCHC RBC AUTO-ENTMCNC: 32.8 G/DL (ref 31.5–36.5)
MCV RBC AUTO: 96 FL (ref 78–100)
PLATELET # BLD AUTO: 371 10E9/L (ref 150–450)
POTASSIUM SERPL-SCNC: 4.1 MMOL/L (ref 3.4–5.3)
RBC # BLD AUTO: 4.02 10E12/L (ref 4.4–5.9)
SODIUM SERPL-SCNC: 138 MMOL/L (ref 133–144)
WBC # BLD AUTO: 12.5 10E9/L (ref 4–11)

## 2017-11-20 PROCEDURE — 36415 COLL VENOUS BLD VENIPUNCTURE: CPT | Performed by: NURSE PRACTITIONER

## 2017-11-20 PROCEDURE — 93000 ELECTROCARDIOGRAM COMPLETE: CPT | Performed by: NURSE PRACTITIONER

## 2017-11-20 PROCEDURE — 99214 OFFICE O/P EST MOD 30 MIN: CPT | Performed by: NURSE PRACTITIONER

## 2017-11-20 PROCEDURE — 85027 COMPLETE CBC AUTOMATED: CPT | Performed by: NURSE PRACTITIONER

## 2017-11-20 PROCEDURE — 80048 BASIC METABOLIC PNL TOTAL CA: CPT | Performed by: NURSE PRACTITIONER

## 2017-11-20 NOTE — NURSING NOTE
"Chief Complaint   Patient presents with     Syncope       Initial /79  Pulse 62  Temp 98.4  F (36.9  C) (Oral)  Ht 5' 6\" (1.676 m)  Wt 179 lb (81.2 kg)  SpO2 98%  BMI 28.89 kg/m2 Estimated body mass index is 28.89 kg/(m^2) as calculated from the following:    Height as of this encounter: 5' 6\" (1.676 m).    Weight as of this encounter: 179 lb (81.2 kg).  Medication Reconciliation: complete  "

## 2017-11-20 NOTE — PROGRESS NOTES
SUBJECTIVE:   Donnie Ernandez is a 62 year old male who presents to clinic today for the following health issues:      Concern - syncope   Onset: happened Saturday night 11/16/17    Description:   Patient states he was working around the house, doing chores when all of a sudden he felt lightheaded- went to sit down in chair and must have passed out-  Doesn't remember anything after sitting down, but he woke on the floor with paramedics in the house.  Took his blood pressure pills together right before eating.    Intensity: moderate    Progression of Symptoms:  improved    Accompanying Signs & Symptoms:  Refused emergency room treatment  Has had continued nerve pain in his back which he uses tylenol for.    Previous history of similar problem:   Yes, couple years ago    Precipitating factors:   Worsened by: unknown    Alleviating factors:  Improved by: time    Therapies Tried and outcome: 911 called, paramedics evaluated him at home.  Refused emergency room treatment    Felt normal the whole day - nothing was new or different.  Got home from work, had 6 beers (normal for a Saturday - denies drinking every day), ate dinner and then took his BP medications.  Had sudden onset of lightheadedness and felt the need to sit down - then passed out and woke up on the floor.  Denies chest pain or shortness of breath.  States that everyone told him he was pale.  Was tired the rest of the night - tired yesterday.  Today he felt normal - worked a whole day and didn't have any problems.    Daughter here with him today - states that he has called her a few times c/o feeling shaky, lightheaded and diaphoretic.  No h/o heart disease.  FH: early heart disease in brother.      Problem list and histories reviewed & adjusted, as indicated.  Additional history: as documented    Reviewed and updated as needed this visit by clinical staff  Tobacco  Allergies  Meds       Reviewed and updated as needed this visit by Provider      "    ROS:  Constitutional, HEENT, cardiovascular, pulmonary, gi and gu systems are negative, except as otherwise noted.      OBJECTIVE:   /79  Pulse 62  Temp 98.4  F (36.9  C) (Oral)  Ht 5' 6\" (1.676 m)  Wt 179 lb (81.2 kg)  SpO2 98%  BMI 28.89 kg/m2  Body mass index is 28.89 kg/(m^2).  GENERAL: healthy, alert and no distress  HENT: ear canals and TM's normal, nose and mouth without ulcers or lesions  NECK: no adenopathy, no asymmetry, masses, or scars and thyroid normal to palpation  RESP: lungs clear to auscultation - no rales, rhonchi or wheezes  CV: regular rate and rhythm, normal S1 S2, no S3 or S4, no murmur, click or rub, no peripheral edema and peripheral pulses strong    Diagnostic Test Results:  Results for orders placed or performed in visit on 11/20/17 (from the past 24 hour(s))   CBC with platelets   Result Value Ref Range    WBC 12.5 (H) 4.0 - 11.0 10e9/L    RBC Count 4.02 (L) 4.4 - 5.9 10e12/L    Hemoglobin 12.7 (L) 13.3 - 17.7 g/dL    Hematocrit 38.7 (L) 40.0 - 53.0 %    MCV 96 78 - 100 fl    MCH 31.6 26.5 - 33.0 pg    MCHC 32.8 31.5 - 36.5 g/dL    RDW 14.2 10.0 - 15.0 %    Platelet Count 371 150 - 450 10e9/L   Basic metabolic panel   Result Value Ref Range    Sodium 138 133 - 144 mmol/L    Potassium 4.1 3.4 - 5.3 mmol/L    Chloride 106 94 - 109 mmol/L    Carbon Dioxide 28 20 - 32 mmol/L    Anion Gap 4 3 - 14 mmol/L    Glucose 97 70 - 99 mg/dL    Urea Nitrogen 18 7 - 30 mg/dL    Creatinine 0.90 0.66 - 1.25 mg/dL    GFR Estimate 86 >60 mL/min/1.7m2    GFR Estimate If Black >90 >60 mL/min/1.7m2    Calcium 9.0 8.5 - 10.1 mg/dL     EKG - appears normal, NSR, normal axis, normal intervals, no acute ST/T changes c/w ischemia, no LVH by voltage criteria, unchanged from previous tracings    ASSESSMENT/PLAN:       ICD-10-CM    1. Syncope, unspecified syncope type R55 EKG 12-lead complete w/read - Clinics     CBC with platelets     Basic metabolic panel     Exercise Stress Echocardiogram "     Syncopal episode 2 days ago - presyncope and diaphoresis on other occasions.   Etiology unclear.  Will need to r/o cardiac etiology with stress ECHO.  Discussed with patient that the alcohol may have played a part - recommended no more that 2 drinks per day. Also discouraged patient from combining medications with alcohol.     Patient Instructions   Schedule stress test: [phone]      The risks, benefits and treatment options of prescribed medications or other treatments have been discussed with the patient. The patient verbalized their understanding and should call or follow up if no improvement or if they develop further problems.    MAYANK [facility]

## 2017-11-20 NOTE — MR AVS SNAPSHOT
After Visit Summary   11/20/2017    Donnie Ernandez    MRN: [id number]           Patient Information     Date Of Birth          1955        Visit Information        Provider Department      11/20/2017 2:40 PM Mae Jones APRN CNP [facility]        Today's Diagnoses     Syncope, unspecified syncope type    -  1      Care Instructions    Schedule stress test: [phone]      Thank you for choosing [facility].  You may be receiving a survey in the mail from Diana Sevilla regarding your visit today.  Please take a few minutes to complete and return the survey to let us know how we are doing.      If you have questions or concerns, please contact us via [employer] or you can contact your care team at [phone].    Our Clinic hours are:  Monday 6:40 am  to 7:00 pm  Tuesday -Friday 6:40 am to 5:00 pm    The [place] outpatient lab hours are:  Monday - Friday 6:10 am to 4:45 pm  Saturdays 7:00 am to 11:00 am  Appointments are required, call [phone]    If you have clinical questions after hours or would like to schedule an appointment,  call the clinic at [phone].            Follow-ups after your visit        Future tests that were ordered for you today     Open Future Orders        Priority Expected Expires Ordered    Exercise Stress Echocardiogram Routine  11/20/2018 11/20/2017            Who to contact     If you have questions or need follow up information about today's clinic visit or your schedule please contact [facility] directly at [phone].  Normal or non-critical lab and imaging results will be communicated to you by [employer], letter or phone within 4 business days after the clinic has received the results. If you do not hear from us within 7 days, please contact the clinic through [employer] or phone. If you have a critical or abnormal lab result, we will notify you by phone as soon as possible.  Submit refill requests through [employer]  "or call your pharmacy and they will forward the refill request to us. Please allow 3 business days for your refill to be completed.          Additional Information About Your Visit        MyChart Information     F[employer] lets you send messages to your doctor, view your test results, renew your prescriptions, schedule appointments and more. To sign up, go to [email or web address] . Click on \"Log in\" on the left side of the screen, which will take you to the Welcome page. Then click on \"Sign up Now\" on the right side of the page.     You will be asked to enter the access code listed below, as well as some personal information. Please follow the directions to create your username and password.     Your access code is: [email or web address]  Expires: 2018  3:47 PM     Your access code will  in 90 days. If you need help or a new code, please call your [facility] clinic or [phone].        Care EveryWhere ID     This is your Care EveryWhere ID. This could be used by other organizations to access your [facility] medical records  [id number]        Your Vitals Were     Pulse Temperature Height Pulse Oximetry BMI (Body Mass Index)       62 98.4  F (36.9  C) (Oral) 5' 6\" (1.676 m) 98% 28.89 kg/m2        Blood Pressure from Last 3 Encounters:   17 163/79   17 155/86   17 147/88    Weight from Last 3 Encounters:   17 179 lb (81.2 kg)   17 180 lb (81.6 kg)   17 178 lb (80.7 kg)              We Performed the Following     Basic metabolic panel     CBC with platelets     EKG 12-lead complete w/read - Clinics        Primary Care Provider Office Phone # Fax #    Mae MAYANK Zhou [facility] [phone] [address] [address]        Equal Access to Services     BARAK GRANT : Ana Coronado, veda quarles, marquis willoughby. [facility] [phone].    ATENCIÓN: Si leana duggan " edmondson disposición servicios gratuitos de asistencia lingüística. Melanie sanchez [phone].    We comply with applicable federal civil rights laws and [place] laws. We do not discriminate on the basis of race, color, national origin, age, disability, sex, sexual orientation, or gender identity.            Thank you!     Thank you for choosing [facility]  for your care. Our goal is always to provide you with excellent care. Hearing back from our patients is one way we can continue to improve our services. Please take a few minutes to complete the written survey that you may receive in the mail after your visit with us. Thank you!             Your Updated Medication List - Protect others around you: Learn how to safely use, store and throw away your medicines at www.disposemymeds.org.          This list is accurate as of: 11/20/17  3:47 PM.  Always use your most recent med list.                   Brand Name Dispense Instructions for use Diagnosis    losartan 50 MG tablet    COZAAR    90 tablet    Take 1 tablet (50 mg) by mouth daily    Essential hypertension with goal blood pressure less than 140/90       metoprolol 50 MG tablet    LOPRESSOR    180 tablet    Take 1 tablet (50 mg) by mouth 2 times daily    Essential hypertension with goal blood pressure less than 140/90       order for DME      CPAP: 6 cm H2O  CPAP mask fitting  Lifetime need and heated humidity.    NILDA (obstructive sleep apnea)       TYLENOL EXTRA STRENGTH PO      Take by mouth 2 times daily        zolpidem 12.5 MG CR tablet    AMBIEN CR    30 tablet    Take 1 tablet (12.5 mg) by mouth nightly as needed for sleep    Insomnia, unspecified

## 2017-11-20 NOTE — PATIENT INSTRUCTIONS
Schedule stress test: [phone]      Thank you for choosing [facility].  You may be receiving a survey in the mail from [employer] regarding your visit today.  Please take a few minutes to complete and return the survey to let us know how we are doing.      If you have questions or concerns, please contact us via [employer] or you can contact your care team at [phone].    Our Clinic hours are:  Monday 6:40 am  to 7:00 pm  Tuesday -Friday 6:40 am to 5:00 pm    The [place] outpatient lab hours are:  Monday - Friday 6:10 am to 4:45 pm  Saturdays 7:00 am to 11:00 am  Appointments are required, call [phone]    If you have clinical questions after hours or would like to schedule an appointment,  call the clinic at [phone].

## 2017-12-11 ENCOUNTER — TELEPHONE (OUTPATIENT)
Dept: FAMILY MEDICINE | Facility: CLINIC | Age: 62
End: 2017-12-11

## 2017-12-11 DIAGNOSIS — F51.01 PRIMARY INSOMNIA: Primary | ICD-10-CM

## 2017-12-12 NOTE — TELEPHONE ENCOUNTER
Routing refill request to provider for review/approval because:  Drug not on the [facility] refill protocol     Lilo Storey RN

## 2017-12-13 RX ORDER — ZOLPIDEM TARTRATE 12.5 MG/1
12.5 TABLET, FILM COATED, EXTENDED RELEASE ORAL
Qty: 30 TABLET | Refills: 5 | Status: SHIPPED | OUTPATIENT
Start: 2017-12-13 | End: 2018-06-18

## 2017-12-13 NOTE — TELEPHONE ENCOUNTER
Rx signed and Faxed to [facility] in [place] @[phone]. Patient also notified.  Funmi Dsouza  [facility] [place] [facility]

## 2018-02-07 ENCOUNTER — OFFICE VISIT (OUTPATIENT)
Dept: FAMILY MEDICINE | Facility: CLINIC | Age: 63
End: 2018-02-07
Payer: COMMERCIAL

## 2018-02-07 VITALS
DIASTOLIC BLOOD PRESSURE: 80 MMHG | BODY MASS INDEX: 28.7 KG/M2 | HEIGHT: 66 IN | SYSTOLIC BLOOD PRESSURE: 160 MMHG | TEMPERATURE: 100.1 F | HEART RATE: 85 BPM | OXYGEN SATURATION: 98 % | WEIGHT: 178.6 LBS

## 2018-02-07 DIAGNOSIS — R50.9 FEVER, UNSPECIFIED FEVER CAUSE: ICD-10-CM

## 2018-02-07 DIAGNOSIS — J10.1 INFLUENZA A: Primary | ICD-10-CM

## 2018-02-07 LAB
FLUAV+FLUBV AG SPEC QL: NEGATIVE
FLUAV+FLUBV AG SPEC QL: POSITIVE
SPECIMEN SOURCE: ABNORMAL

## 2018-02-07 PROCEDURE — 87804 INFLUENZA ASSAY W/OPTIC: CPT | Mod: 59 | Performed by: NURSE PRACTITIONER

## 2018-02-07 PROCEDURE — 99213 OFFICE O/P EST LOW 20 MIN: CPT | Performed by: NURSE PRACTITIONER

## 2018-02-07 RX ORDER — CODEINE PHOSPHATE AND GUAIFENESIN 10; 100 MG/5ML; MG/5ML
SOLUTION ORAL
Qty: 120 ML | Refills: 0 | Status: SHIPPED | OUTPATIENT
Start: 2018-02-07 | End: 2018-05-08

## 2018-02-07 RX ORDER — BENZONATATE 100 MG/1
100 CAPSULE ORAL 3 TIMES DAILY PRN
Qty: 42 CAPSULE | Refills: 0 | Status: SHIPPED | OUTPATIENT
Start: 2018-02-07 | End: 2018-05-08

## 2018-02-07 NOTE — MR AVS SNAPSHOT
After Visit Summary   2/7/2018    Donnie Ernandez    MRN: [id number]           Patient Information     Date Of Birth          1955        Visit Information        Provider Department      2/7/2018 9:00 AM Mae Jones APRN CNP [facility]        Today's Diagnoses     Influenza A    -  1    Fever, unspecified fever cause          Care Instructions          Thank you for choosing [facility].  You may be receiving a survey in the mail from [facility] regarding your visit today.  Please take a few minutes to complete and return the survey to let us know how we are doing.      If you have questions or concerns, please contact us via [employer] or you can contact your care team at [phone].    Our Clinic hours are:  Monday 6:40 am  to 7:00 pm  Tuesday -Friday 6:40 am to 5:00 pm    The [place] outpatient lab hours are:  Monday - Friday 6:10 am to 4:45 pm  Saturdays 7:00 am to 11:00 am  Appointments are required, call [phone]    If you have clinical questions after hours or would like to schedule an appointment,  call the clinic at [phone].            Follow-ups after your visit        Who to contact     If you have questions or need follow up information about today's clinic visit or your schedule please contact [facility] directly at [phone].  Normal or non-critical lab and imaging results will be communicated to you by MyChart, letter or phone within 4 business days after the clinic has received the results. If you do not hear from us within 7 days, please contact the clinic through [employer] or phone. If you have a critical or abnormal lab result, we will notify you by phone as soon as possible.  Submit refill requests through [employer] or call your pharmacy and they will forward the refill request to us. Please allow 3 business days for your refill to be completed.          Additional Information About Your Visit        MyChart Information  "    [employer] lets you send messages to your doctor, view your test results, renew your prescriptions, schedule appointments and more. To sign up, go to [email or web address] . Click on \"Log in\" on the left side of the screen, which will take you to the Welcome page. Then click on \"Sign up Now\" on the right side of the page.     You will be asked to enter the access code listed below, as well as some personal information. Please follow the directions to create your username and password.     Your access code is: [email or web address]  Expires: 2018  3:47 PM     Your access code will  in 90 days. If you need help or a new code, please call your [facility] clinic or [phone].        Care EveryWhere ID     This is your Care EveryWhere ID. This could be used by other organizations to access your [facility] medical records  [id number]        Your Vitals Were     Pulse Temperature Height Pulse Oximetry BMI (Body Mass Index)       85 100.1  F (37.8  C) (Tympanic) 5' 6\" (1.676 m) 98% 28.83 kg/m2        Blood Pressure from Last 3 Encounters:   18 160/80   17 163/79   17 155/86    Weight from Last 3 Encounters:   18 178 lb 9.6 oz (81 kg)   17 179 lb (81.2 kg)   17 180 lb (81.6 kg)              We Performed the Following     Influenza A/B antigen          Today's Medication Changes          These changes are accurate as of 18  9:50 AM.  If you have any questions, ask your nurse or doctor.               Start taking these medicines.        Dose/Directions    benzonatate 100 MG capsule   Commonly known as:  TESSALON   Used for:  Influenza A   Started by:  Mae Jones APRN CNP        Dose:  100 mg   Take 1 capsule (100 mg) by mouth 3 times daily as needed   Quantity:  42 capsule   Refills:  0       guaiFENesin-codeine 100-10 MG/5ML Soln solution   Commonly known as:  ROBITUSSIN AC   Used for:  Influenza A   Started by:  Mae Jones APRN CNP        1-2 " tsp at bedtime as needed for coughing.   Quantity:  120 mL   Refills:  0            Where to get your medicines      These medications were sent to [facility] [place] - [place], [place] - [address]  [address] [address]     Phone:  [phone]     benzonatate 100 MG capsule         Some of these will need a paper prescription and others can be bought over the counter.  Ask your nurse if you have questions.     Bring a paper prescription for each of these medications     guaiFENesin-codeine 100-10 MG/5ML Soln solution                Primary Care Provider Office Phone # Fax #    Mae Vazquezjuan daniel Jones, APRN [phone] [address] [address]        Equal Access to Services     BARAK GRANT : Hadii aad ku hadasho Soomaali, waaxda luqadaha, qaybta kaalmada adeegyada, marquis travis . So [place] [phone].    ATENCIÓN: Si habla español, tiene a edmondson disposición servicios gratuitos de asistencia lingüística. Llame al [phone].    We comply with applicable federal civil rights laws and [place] laws. We do not discriminate on the basis of race, color, national origin, age, disability, sex, sexual orientation, or gender identity.            Thank you!     Thank you for choosing [facility]  for your care. Our goal is always to provide you with excellent care. Hearing back from our patients is one way we can continue to improve our services. Please take a few minutes to complete the written survey that you may receive in the mail after your visit with us. Thank you!             Your Updated Medication List - Protect others around you: Learn how to safely use, store and throw away your medicines at www.disposemymeds.org.          This list is accurate as of 2/7/18  9:50 AM.  Always use your most recent med list.                   Brand Name Dispense Instructions for use Diagnosis    benzonatate 100 MG capsule     TESSALON    42 capsule    Take 1 capsule (100 mg) by mouth 3 times daily as needed    Influenza A       guaiFENesin-codeine 100-10 MG/5ML Soln solution    ROBITUSSIN AC    120 mL    1-2 tsp at bedtime as needed for coughing.    Influenza A       losartan 50 MG tablet    COZAAR    90 tablet    Take 1 tablet (50 mg) by mouth daily    Essential hypertension with goal blood pressure less than 140/90       metoprolol tartrate 50 MG tablet    LOPRESSOR    180 tablet    Take 1 tablet (50 mg) by mouth 2 times daily    Essential hypertension with goal blood pressure less than 140/90       order for DME      CPAP: 6 cm H2O  CPAP mask fitting  Lifetime need and heated humidity.    NILDA (obstructive sleep apnea)       TYLENOL EXTRA STRENGTH PO      Take by mouth 2 times daily        zolpidem 12.5 MG CR tablet    AMBIEN CR    30 tablet    Take 1 tablet (12.5 mg) by mouth nightly as needed for sleep    Primary insomnia

## 2018-02-07 NOTE — LETTER
[facility]  [address] [address]  [phone]          February 7, 2018    RE:  Donnie Ernandez                                                                                                                                                       [address] [facility] [address]            To whom it may concern:    Donnie Ernandez was seen in my clinic and diagnosed with influenza A. Therefore he is unable to work until he has been without a fever for 24 hours.        Sincerely,          Mae Jones, CNP

## 2018-02-07 NOTE — PROGRESS NOTES
"  SUBJECTIVE:   Donnie Ernandez is a 62 year old male who presents to clinic today for the following health issues:      ENT Symptoms             Symptoms: cc Present Absent Comment   Fever/Chills  x  Burning up last night   Fatigue  x     Muscle Aches  x     Eye Irritation  x  watery   Sneezing   x    Nasal Mike/Drg  x  drainage   Sinus Pressure/Pain  x     Loss of smell   x    Dental pain   x    Sore Throat   x    Swollen Glands   x    Ear Pain/Fullness   x    Cough x x  Dry cough   Wheeze   x    Chest Pain   x    Shortness of breath  x     Rash   x    Other  x  \"killer headache\"     Symptom duration:  about 48 hours   Symptom severity:  moderate   Treatments tried:  Tylenol, cough medication   Contacts:  wife just started showing symptoms             Problem list and histories reviewed & adjusted, as indicated.  Additional history: as documented      Reviewed and updated as needed this visit by clinical staff  Tobacco  Allergies  Med Hx  Surg Hx  Fam Hx  Soc Hx      Reviewed and updated as needed this visit by Provider         ROS:  Constitutional, HEENT, cardiovascular, pulmonary, gi and gu systems are negative, except as otherwise noted.    OBJECTIVE:     /80 (BP Location: Left arm, Patient Position: Chair, Cuff Size: Adult Large)  Pulse 85  Temp 100.1  F (37.8  C) (Tympanic)  Ht 5' 6\" (1.676 m)  Wt 178 lb 9.6 oz (81 kg)  SpO2 98%  BMI 28.83 kg/m2  Body mass index is 28.83 kg/(m^2).  GENERAL: healthy, alert and no distress  HENT: ear canals and TM's normal, nose and mouth without ulcers or lesions  NECK: no adenopathy, no asymmetry, masses, or scars and thyroid normal to palpation  RESP: lungs clear to auscultation - no rales, rhonchi or wheezes  CV: regular rate and rhythm, normal S1 S2, no S3 or S4, no murmur, click or rub, no peripheral edema and peripheral pulses strong    Diagnostic Test Results:  Results for orders placed or performed in visit on 02/07/18 (from the past 24 hour(s)) "   Influenza A/B antigen   Result Value Ref Range    Influenza A/B Agn Specimen Nasal     Influenza A Positive (A) NEG^Negative    Influenza B Negative NEG^Negative       ASSESSMENT/PLAN:       ICD-10-CM    1. Influenza A J10.1 benzonatate (TESSALON) 100 MG capsule     guaiFENesin-codeine (ROBITUSSIN AC) 100-10 MG/5ML SOLN solution   2. Fever, unspecified fever cause R50.9 Influenza A/B antigen     Discussed pros and cons of Tamiflu - patient declines.  Cough suppressants given for symptom relief.  Discussed normal course of influenza.  Discussed signs/symptoms that would prompt urgent follow up.      The risks, benefits and treatment options of prescribed medications or other treatments have been discussed with the patient. The patient verbalized their understanding and should call or follow up if no improvement or if they develop further problems.    MAYANK Pitt [facility]

## 2018-02-07 NOTE — PATIENT INSTRUCTIONS
Thank you for choosing [facility].  You may be receiving a survey in the mail from Diana Sevilla regarding your visit today.  Please take a few minutes to complete and return the survey to let us know how we are doing.      If you have questions or concerns, please contact us via [employer] or you can contact your care team at [phone].    Our Clinic hours are:  Monday 6:40 am  to 7:00 pm  Tuesday -Friday 6:40 am to 5:00 pm    The [place] outpatient lab hours are:  Monday - Friday 6:10 am to 4:45 pm  Saturdays 7:00 am to 11:00 am  Appointments are required, call [phone]    If you have clinical questions after hours or would like to schedule an appointment,  call the clinic at [phone].

## 2018-04-02 ENCOUNTER — TELEPHONE (OUTPATIENT)
Dept: FAMILY MEDICINE | Facility: CLINIC | Age: 63
End: 2018-04-02

## 2018-04-02 NOTE — TELEPHONE ENCOUNTER
BP high in Feb when here for influenza.  Please ask him to come in for a recheck with the RN  Mae Jones, CNP

## 2018-05-01 ENCOUNTER — TELEPHONE (OUTPATIENT)
Dept: FAMILY MEDICINE | Facility: CLINIC | Age: 63
End: 2018-05-01

## 2018-05-01 NOTE — TELEPHONE ENCOUNTER
Reason for Call:  Other orders    Detailed comments: Patient is asking for orders for an TIM as his back is giving him problems.    Phone Number Patient can be reached at: Cell number on file:    Telephone Information:   Mobile [phone]       Best Time: any    Can we leave a detailed message on this number? YES    Call taken on 5/1/2018 at 9:27 AM by Alexa Minaya

## 2018-05-01 NOTE — TELEPHONE ENCOUNTER
Covering for PCP.    His last MRI in 2015 showed the following:  IMPRESSION:   1. At L4-L5 there has been interval surgery on the right. A previously  seen right-sided disc herniation is no longer demonstrated. No  recurrent or residual disc herniation is seen. There has been  resolution of a previously seen central canal stenosis.  2. At L5-S1 there has been no change in a small central disc  protrusion which does not result in any stenosis.    Since this appears to be a recurrence or worsening of the pain, patient is best seen by a provider first so the appropriate recommendation is given.

## 2018-05-01 NOTE — TELEPHONE ENCOUNTER
"    S-(situation): back pain    B-(background): still having nerve pain    A-(assessment): down low back on right side and down right leg. No numbness or tingling. Lots of pain and discomfort. Same as the last time he had pain in his back. States, \"I am getting old and not as flexible as I used to be.\" no new injury. Two old herniated discs from the past. Last injection was 7/2017. He states he wants to do this again    R-(recommendations): requesting another TIM injection. Please sign the TIM referral if appropriate.     Tatianna Doyle, OBEYN, RN      "

## 2018-05-08 ENCOUNTER — TELEPHONE (OUTPATIENT)
Dept: PALLIATIVE MEDICINE | Facility: CLINIC | Age: 63
End: 2018-05-08

## 2018-05-08 ENCOUNTER — OFFICE VISIT (OUTPATIENT)
Dept: FAMILY MEDICINE | Facility: CLINIC | Age: 63
End: 2018-05-08
Payer: COMMERCIAL

## 2018-05-08 VITALS
DIASTOLIC BLOOD PRESSURE: 73 MMHG | HEART RATE: 63 BPM | TEMPERATURE: 98.7 F | HEIGHT: 66 IN | WEIGHT: 171 LBS | BODY MASS INDEX: 27.48 KG/M2 | RESPIRATION RATE: 14 BRPM | SYSTOLIC BLOOD PRESSURE: 153 MMHG

## 2018-05-08 DIAGNOSIS — I10 BENIGN ESSENTIAL HYPERTENSION: ICD-10-CM

## 2018-05-08 DIAGNOSIS — M54.16 LUMBAR RADICULOPATHY: Primary | ICD-10-CM

## 2018-05-08 PROCEDURE — 99214 OFFICE O/P EST MOD 30 MIN: CPT | Performed by: NURSE PRACTITIONER

## 2018-05-08 RX ORDER — LOSARTAN POTASSIUM 100 MG/1
100 TABLET ORAL DAILY
Qty: 90 TABLET | Refills: 3 | Status: SHIPPED | OUTPATIENT
Start: 2018-05-08 | End: 2019-06-26

## 2018-05-08 NOTE — MR AVS SNAPSHOT
After Visit Summary   5/8/2018    Donnie Ernandez    MRN: [id number]           Patient Information     Date Of Birth          1955        Visit Information        Provider Department      5/8/2018 7:40 AM Mae Jones APRN CNP [facility]        Today's Diagnoses     Lumbar radiculopathy    -  1    Benign essential hypertension          Care Instructions    Increase losartan to 100 mg once daily.  Continue metoprolol 50 mg twice daily.      Schedule injection.          Thank you for choosing [facility].  You may be receiving a survey in the mail from Diana Sevilla regarding your visit today.  Please take a few minutes to complete and return the survey to let us know how we are doing.      If you have questions or concerns, please contact us via [employer] or you can contact your care team at [phone].    Our Clinic hours are:  Monday 6:40 am  to 7:00 pm  Tuesday -Friday 6:40 am to 5:00 pm    The [place] outpatient lab hours are:  Monday - Friday 6:10 am to 4:45 pm  Saturdays 7:00 am to 11:00 am  Appointments are required, call [phone]    If you have clinical questions after hours or would like to schedule an appointment,  call the clinic at [phone].            Follow-ups after your visit        Additional Services     PAIN MANAGEMENT REFERRAL       Your provider has referred you to: [facility]: [facility] -    Reason for Referral: Procedure Order Epidural:  Lumbar (Advanced imaging required in the last 3 years)      What is your diagnosis for the patient's pain? Lumbar radiculopathy       For any questions, contact the [facility] at [phone].     **ANY DIAGNOSTIC TESTS THAT ARE NOT IN EPIC SHOULD BE SENT TO THE PAIN CENTER**    REGARDING OPIOID MEDICATIONS:  The discussion of opioids management, appropriateness of therapy, and dosing will be discussed in patients being seen for evaluation.  The pain management  clinics are not long-term prescribing clinics, with transition of prescribing of medications ultimately going back to the referring provider/PCP.  If prescribing is taken over at the pain clinic, it is in actively involved patients whom are appropriate for opioids, urine drug screening is completed, and long-term prescribing plan has been determined.  Therefore, we will not be automatically taking over prescribing at the patient's first visit.  Is this agreeable to you? agrees.     Please be aware that coverage of these services is subject to the terms and limitations of your health insurance plan.  Call member services at your health plan with any benefit or coverage questions.      Please bring the following with you to your appointment:    (1) Any X-Rays, CTs or MRIs which have been performed.  Contact the facility where they were done to arrange for  prior to your scheduled appointment.    (2) List of current medications   (3) This referral request   (4) Any documents/labs given to you for this referral                  Who to contact     If you have questions or need follow up information about today's clinic visit or your schedule please contact [facility] directly at [phone].  Normal or non-critical lab and imaging results will be communicated to you by [employer], letter or phone within 4 business days after the clinic has received the results. If you do not hear from us within 7 days, please contact the clinic through [employer] or phone. If you have a critical or abnormal lab result, we will notify you by phone as soon as possible.  Submit refill requests through [employer] or call your pharmacy and they will forward the refill request to us. Please allow 3 business days for your refill to be completed.          Additional Information About Your Visit        [employer] Information     [employer] lets you send messages to your doctor, view your test results, renew your prescriptions, schedule  "appointments and more. To sign up, go to [email or web address] . Click on \"Log in\" on the left side of the screen, which will take you to the Welcome page. Then click on \"Sign up Now\" on the right side of the page.     You will be asked to enter the access code listed below, as well as some personal information. Please follow the directions to create your username and password.     Your access code is: [email or web address]  Expires: 2018  8:01 AM     Your access code will  in 90 days. If you need help or a new code, please call your [place] clinic or [phone].        Care EveryWhere ID     This is your Care EveryWhere ID. This could be used by other organizations to access your [facility] medical records  [id number]        Your Vitals Were     Pulse Temperature Respirations Height BMI (Body Mass Index)       63 98.7  F (37.1  C) (Tympanic) 14 5' 6\" (1.676 m) 27.6 kg/m2        Blood Pressure from Last 3 Encounters:   18 153/73   18 160/80   17 163/79    Weight from Last 3 Encounters:   18 171 lb (77.6 kg)   18 178 lb 9.6 oz (81 kg)   17 179 lb (81.2 kg)              We Performed the Following     PAIN MANAGEMENT REFERRAL          Today's Medication Changes          These changes are accurate as of 18  8:01 AM.  If you have any questions, ask your nurse or doctor.               These medicines have changed or have updated prescriptions.        Dose/Directions    losartan 100 MG tablet   Commonly known as:  COZAAR   This may have changed:    - medication strength  - how much to take   Used for:  Benign essential hypertension   Changed by:  Mae Jones APRN CNP        Dose:  100 mg   Take 1 tablet (100 mg) by mouth daily   Quantity:  90 tablet   Refills:  3            Where to get your medicines      These medications were sent to [facility] [place], [place] - [address]  [address] [address]     Phone:  [phone] "     losartan 100 MG tablet                Primary Care Provider Office Phone # Fax #    Mae Jones, APRN [phone] [address] [address]        Equal Access to Services     BARAK GRANT : Hadfinn dietrich hadmaiao Soomaali, waaxda luqadaha, qaybta kaalmada adeegyada, waxjc jacobin haygilberton janette stanley angy cooper. So [facility] [phone].    ATENCIÓN: Si habla español, tiene a edmondson disposición servicios gratuitos de asistencia lingüística. Llame al [phone].    We comply with applicable federal civil rights laws and [place] laws. We do not discriminate on the basis of race, color, national origin, age, disability, sex, sexual orientation, or gender identity.            Thank you!     Thank you for choosing [facility]  for your care. Our goal is always to provide you with excellent care. Hearing back from our patients is one way we can continue to improve our services. Please take a few minutes to complete the written survey that you may receive in the mail after your visit with us. Thank you!             Your Updated Medication List - Protect others around you: Learn how to safely use, store and throw away your medicines at www.disposemymeds.org.          This list is accurate as of 5/8/18  8:01 AM.  Always use your most recent med list.                   Brand Name Dispense Instructions for use Diagnosis    losartan 100 MG tablet    COZAAR    90 tablet    Take 1 tablet (100 mg) by mouth daily    Benign essential hypertension       metoprolol tartrate 50 MG tablet    LOPRESSOR    180 tablet    Take 1 tablet (50 mg) by mouth 2 times daily    Essential hypertension with goal blood pressure less than 140/90       order for DME      CPAP: 6 cm H2O  CPAP mask fitting  Lifetime need and heated humidity.    NILDA (obstructive sleep apnea)       TYLENOL EXTRA STRENGTH PO      Take by mouth 2 times daily        zolpidem 12.5 MG CR tablet    AMBIEN CR    30 tablet     Take 1 tablet (12.5 mg) by mouth nightly as needed for sleep    Primary insomnia

## 2018-05-08 NOTE — PATIENT INSTRUCTIONS
Increase losartan to 100 mg once daily.  Continue metoprolol 50 mg twice daily.      Schedule injection.          Thank you for choosing [facility].  You may be receiving a survey in the mail from [employer] regarding your visit today.  Please take a few minutes to complete and return the survey to let us know how we are doing.      If you have questions or concerns, please contact us via [employer] or you can contact your care team at [phone].    Our Clinic hours are:  Monday 6:40 am  to 7:00 pm  Tuesday -Friday 6:40 am to 5:00 pm    The [place] outpatient lab hours are:  Monday - Friday 6:10 am to 4:45 pm  Saturdays 7:00 am to 11:00 am  Appointments are required, call [phone]    If you have clinical questions after hours or would like to schedule an appointment,  call the clinic at [phone].

## 2018-05-08 NOTE — TELEPHONE ENCOUNTER
Pre-screening Questions for Radiology Injections:     Injection to be done at which interventional clinic site? [facility]   Procedure ordered by ROBIN     Procedure ordered? Lumbar Epidural Steroid Injection     What insurance would patient like us to bill for this procedure? MEDICA       Worker's comp-Any injection DO NOT SCHEDULE and route to Esther Coates.       [employer] insurance - For SI joint injections, DO NOT SCHEDULE and route Esther Coates.       [employer]- MBB's must be scheduled at LEAST two weeks apart       [employer] - Any injection besides hip/shoulder/knee joint DO NOT SCHEDULE and route to Esther Coates. She will obtain PA and call pt back to schedule procedure or notify pt of denial.     HP [employer]-PA REQUIRED FOR NON-TIM OR Joint injections     Any chance of pregnancy? Not Applicable   If YES, do NOT schedule and route to RN pool     Is an  needed? No     Patient has a drive home? (mandatory) YES:      Is patient taking any blood thinners (plavix, coumadin, jantoven, warfarin, heparin, pradaxa or dabigatran )? No   If hold needed, do NOT schedule, route to RN pool     Is patient taking any aspirin products? No     If more than 325mg/day do NOT schedule; route to RN pool     For CERVICAL procedures, hold all aspirin products for 6 days.      Does the patient have a bleeding or clotting disorder? No     If yes, okay to schedule AND route to RN nurse pool    **For any patients with platelet count <100, must be forwarded to provider**     Is patient diabetic?  No  If YES, have them bring their glucometer.     Does patient have an active infection or treated for one within the past week? No     Is patient currently taking any antibiotics?  No     For patients on chronic, preventative, or prophylactic antibiotics, procedures may be scheduled.     For patients on antibiotics for active or recent infection:    Alcides Victor [place], [place]-antibiotic course must have  been completed for 4 days    Chantal Grey-antibiotic course must have been completed for 7 days     Is patient currently taking any steroid medications? (i.e. Prednisone, Medrol)  No     For patients on steroid medications:    Alcides Victor [place], [place]-steroid course must have been completed for 4 days    Chantal Grey-steroid course must have been completed for 7 days     Reviewed with patient:  If you are started on any steroids or antibiotics between now and your appointment, you must contact us because it may affect our ability to perform your procedure.  Yes     Is patient actively being treated for cancer or immunocompromised, including the spleen having been removed? No    If YES, do NOT schedule and route to RN pool     **For Dr. Lozada patients without spleens should have the chart sent to her**     Are you able to get on and off an exam table with minimal or no assistance? Yes  If NO, do NOT schedule and route to RN pool     Are you able to roll over and lay on your stomach with minimal or no assistance? Yes  If NO, do NOT schedule and route to RN pool     Any allergies to contrast dye, iodine, shellfish, or numbing and steroid medications? No  If YES, route to RN pool AND add allergy information to appointment notes     Allergies: Lisinopril; Advil [alkylamines]; Augmentin; Prednisone; and Sulfa drugs         Has the patient had a flu shot or any other vaccinations within 7 days before or after the procedure.  No        Does patient have an MRI/CT?  YES:   (SI joint, hip injections, lumbar sympathetic blocks, and stellate ganglion blocks do not require an MRI)    Was the MRI done w/in the last 3 years?  Yes    Was MRI done at [facility]? Yes      If not, where was it done? N/A        If MRI was not done at [facility], [facility] or [facility] do NOT schedule and route to nursing.  If pt has an imaging disc, the injection may be scheduled but pt has to bring disc to appt. If they  show up w/out disc the injection cannot be done     Reminders (please tell patient if applicable):       Instructed pt to arrive 30 minutes early for IV start if this is for a cervical procedure, ALL sympathetic (stellate ganglion, hypogastric, or lumbar sympathetic block) and all sedation procedures (RFA, spinal cord stimulation trials).  Not Applicable    -IVs are not routinely placed for Mcgrath and [place] cervical case       If NPO for sedation, informed patient that it is okay to take medications with sips of water (except if they are to hold blood thinners).  Not Applicable                         *DO take blood pressure medication if it is prescribed*       If this is for a cervical TMI, informed patient that aspirin needs to be held for 6 days.   Not Applicable       For all patients not having spinal cord stimulator (SCS) trials or radiofrequency ablations (RFAs), informed patient:  IV sedation is not provided for this procedure.  If you feel that an oral anti-anxiety medication is needed, you can discuss this further with your referring provider or primary care provider.  The Pain Clinic provider will discuss specifics of what the procedure includes at your appointment.  Most procedures last 10-20 minutes.  We use numbing medications to help with any discomfort during the procedure.  Not Applicable       Do not schedule procedures requiring IV placement in the first appointment of the day or first appointment after lunch.        For patients 85 or older we recommend having an adult stay w/ them for the remainder of the day.        Does the patient have any questions?  yo LOCK    [facility]

## 2018-05-08 NOTE — NURSING NOTE
"Chief Complaint   Patient presents with     Back Pain     Ongoing lumbar issues for years.  Patient would like referral for another injection for L4-5.       Initial /73  Pulse 63  Temp 98.7  F (37.1  C) (Tympanic)  Resp 14  Ht 5' 6\" (1.676 m)  Wt 171 lb (77.6 kg)  BMI 27.6 kg/m2 Estimated body mass index is 27.6 kg/(m^2) as calculated from the following:    Height as of this encounter: 5' 6\" (1.676 m).    Weight as of this encounter: 171 lb (77.6 kg).    Medication Reconciliation:  complete    Cayla Posada CMA (AAMA)  "

## 2018-05-16 ENCOUNTER — RADIOLOGY INJECTION OFFICE VISIT (OUTPATIENT)
Dept: PALLIATIVE MEDICINE | Facility: CLINIC | Age: 63
End: 2018-05-16
Attending: NURSE PRACTITIONER
Payer: COMMERCIAL

## 2018-05-16 ENCOUNTER — HOSPITAL ENCOUNTER (OUTPATIENT)
Dept: RADIOLOGY | Facility: CLINIC | Age: 63
Discharge: HOME | End: 2018-05-16
Attending: ANESTHESIOLOGY | Admitting: ANESTHESIOLOGY
Payer: COMMERCIAL

## 2018-05-16 VITALS — RESPIRATION RATE: 14 BRPM | SYSTOLIC BLOOD PRESSURE: 156 MMHG | DIASTOLIC BLOOD PRESSURE: 75 MMHG | HEART RATE: 54 BPM

## 2018-05-16 DIAGNOSIS — M54.16 LUMBAR RADICULOPATHY: Primary | ICD-10-CM

## 2018-05-16 DIAGNOSIS — M54.16 LUMBAR RADICULOPATHY, CHRONIC: ICD-10-CM

## 2018-05-16 DIAGNOSIS — M54.50 CHRONIC LUMBAR PAIN: ICD-10-CM

## 2018-05-16 DIAGNOSIS — M51.369 DDD (DEGENERATIVE DISC DISEASE), LUMBAR: ICD-10-CM

## 2018-05-16 DIAGNOSIS — G89.29 CHRONIC LUMBAR PAIN: ICD-10-CM

## 2018-05-16 PROCEDURE — 27210202 XR LUMBAR TRANSFORAMINAL INJ SINGLE: Mod: TC

## 2018-05-16 PROCEDURE — 25000128 H RX IP 250 OP 636: Performed by: ANESTHESIOLOGY

## 2018-05-16 PROCEDURE — 64484 NJX AA&/STRD TFRM EPI L/S EA: CPT | Mod: RT

## 2018-05-16 PROCEDURE — 25000125 ZZHC RX 250: Performed by: ANESTHESIOLOGY

## 2018-05-16 PROCEDURE — 64483 NJX AA&/STRD TFRM EPI L/S 1: CPT | Mod: RT | Performed by: ANESTHESIOLOGY

## 2018-05-16 PROCEDURE — 64484 NJX AA&/STRD TFRM EPI L/S EA: CPT | Mod: RT | Performed by: ANESTHESIOLOGY

## 2018-05-16 RX ORDER — IOPAMIDOL 612 MG/ML
3 INJECTION, SOLUTION INTRATHECAL ONCE
Status: COMPLETED | OUTPATIENT
Start: 2018-05-16 | End: 2018-05-16

## 2018-05-16 RX ORDER — METHYLPREDNISOLONE ACETATE 40 MG/ML
40 INJECTION, SUSPENSION INTRA-ARTICULAR; INTRALESIONAL; INTRAMUSCULAR; SOFT TISSUE ONCE
Status: COMPLETED | OUTPATIENT
Start: 2018-05-16 | End: 2018-05-16

## 2018-05-16 RX ORDER — LIDOCAINE HYDROCHLORIDE 10 MG/ML
5 INJECTION, SOLUTION EPIDURAL; INFILTRATION; INTRACAUDAL; PERINEURAL ONCE
Status: COMPLETED | OUTPATIENT
Start: 2018-05-16 | End: 2018-05-16

## 2018-05-16 RX ORDER — DEXAMETHASONE SODIUM PHOSPHATE 10 MG/ML
20 INJECTION, SOLUTION INTRAMUSCULAR; INTRAVENOUS ONCE
Status: COMPLETED | OUTPATIENT
Start: 2018-05-16 | End: 2018-05-16

## 2018-05-16 RX ORDER — BUPIVACAINE HYDROCHLORIDE 5 MG/ML
10 INJECTION, SOLUTION PERINEURAL ONCE
Status: COMPLETED | OUTPATIENT
Start: 2018-05-16 | End: 2018-05-16

## 2018-05-16 RX ADMIN — LIDOCAINE HYDROCHLORIDE 2 ML: 10 INJECTION, SOLUTION EPIDURAL; INFILTRATION; INTRACAUDAL; PERINEURAL at 07:59

## 2018-05-16 RX ADMIN — METHYLPREDNISOLONE ACETATE 40 MG: 40 INJECTION, SUSPENSION INTRA-ARTICULAR; INTRALESIONAL; INTRAMUSCULAR; SOFT TISSUE at 07:57

## 2018-05-16 RX ADMIN — BUPIVACAINE HYDROCHLORIDE 1 ML: 5 INJECTION, SOLUTION EPIDURAL; INTRACAUDAL at 07:58

## 2018-05-16 RX ADMIN — IOPAMIDOL 1 ML: 612 INJECTION, SOLUTION INTRATHECAL at 08:00

## 2018-05-16 RX ADMIN — DEXAMETHASONE SODIUM PHOSPHATE 5 MG: 10 INJECTION, SOLUTION INTRAMUSCULAR; INTRAVENOUS at 07:58

## 2018-05-16 NOTE — DISCHARGE INSTRUCTIONS
[facility]   Procedure Discharge Instructions    Today you saw:    Dr. Sanjay Mcpherson      You had an: Lumbar Transforaminal Epidural steroid injection        Medications used:  Lidocaine   Marcaine   Decadron Depo-medrol Iso Pedro            Be cautious when walking. Numbness and/or weakness in the lower extremities may occur for up to 6-8 hours after the procedure due to effect of the local anesthetic    Do not drive for 6 hours. The effect of the local anesthetic could slow your reflexes.     You may resume your regular activities after 24 hours    Avoid strenuous activity for the first 24 hours    You may shower, however avoid swimming, tub baths or hot tubs for 24 hours following your procedure    You may have a mild to moderate increase in pain for several days following the injection.    It may take up to 14 days for the steroid medication to start working although you may feel the effect as early as a few days after the procedure.       You may use ice packs for 10-15 minutes, 3 to 4 times a day at the injection site for comfort    Do not use heat to painful areas for 6 to 8 hours. This will give the local anesthetic time to wear off and prevent you from accidentally burning your skin.     You may use anti-inflammatory medications (such as Ibuprofen or Aleve or Advil) or Tylenol for pain control if necessary    If you experience any of the following, call the pain center nursing line during work hours at [phone] or the after hours provider line at [phone]:  -Fever over 100 degree F  -Swelling, bleeding, redness, drainage, warmth at the injection site  -Progressive weakness or numbness in your legs  -Loss of bowel or bladder function  -Unusual headache that is not relieved by Tylenol or other pain reliever  -Unusual new onset of pain that is not improving      Phone #s:  Appointment line: [phone];  Nurse line: [phone]

## 2018-05-16 NOTE — PROGRESS NOTES
Pre procedure Diagnosis: lumbar radiculopathy, lumbar degenerative disc disease   Post procedure Diagnosis: Same  Procedure performed: lumbar transforaminal epidural steroid injection at L4-5, L5-S1 on the right, fluoroscopically guided, contrast controlled  Anesthesia: none  Complications: none  Operators: Sanjay Mcpherson MD     Indications:   Donnie Ernandez is a 62 year old male was sent by MAYANK Lutz for lumbar epidural steroid injection.  They have a history of chronic lower back pain with pain radiating into the right buttock and lower extremity.  Exam shows antalgic gait, lumbar tenderness, and positive SLR on the right and they have tried conservative treatment including physical therapy, medications, and interventional procedures.    MRI was done on 9/13/15 which showed   FINDINGS: Numbering of the levels is again based on the demonstration  of five lumbar-type vertebral bodies with a transitional S1 sacral  segment on previous radiographs. Vertebral body alignment is normal.  No fracture is seen. No pars interarticularis defect is demonstrated.  Again seen is a just over 1 cm benign hemangioma within the L3  vertebral body. No other osseous lesion is seen. No abnormal marrow  signal intensity is identified. The conus medullaris terminates at the  level of the L1-L2 disc. No intrathecal abnormality is seen. The  adjacent soft tissues are unremarkable.     Findings by specific level:     T12-L1: The disc and facet joints are normal. No stenosis is seen.     L1-L2: The disc and facet joints are normal. No stenosis is seen.     L2-L3: The disc and facet joints are normal. No stenosis is seen.     L3-L4: Again seen is mild disc dehydration. The disc height is  well-preserved. There is a minimal disc bulge with no herniation or  stenosis seen. The facet joints are unremarkable. This level is  unchanged.     L4-L5: Again seen are surgical changes on the left. There are  additional interval surgical  changes of a right hemilaminectomy. A  previously seen right central disc extrusion is no longer  demonstrated. There is now only mild diffuse disc bulging with no  recurrent or residual disc herniation seen. There are mild  degenerative changes in the facet joints. There has been resolution of  previously seen central canal stenosis. Again seen is mild narrowing  of the right neural foramen. Left neural foramen remains widely  patent.     L5-S1: Again seen are surgical changes of a right hemilaminectomy.  There is disc dehydration. The disc height is well-preserved. There  has been no change in a small broad-based central disc protrusion with  a left central annular fissure. This does not result in any stenosis  or nerve root displacement. There are mild degenerative changes in the  facet joints.     S1-S2: There is a small rudimentary disc at this transitional level  with no herniation or stenosis seen.     IMPRESSION  IMPRESSION:   1. At L4-L5 there has been interval surgery on the right. A previously  seen right-sided disc herniation is no longer demonstrated. No  recurrent or residual disc herniation is seen. There has been  resolution of a previously seen central canal stenosis.  2. At L5-S1 there has been no change in a small central disc  protrusion which does not result in any stenosis.    Options/alternatives, benefits and risks were discussed with the patient including bleeding, infection, tissue trauma, numbness, weakness, paralysis, spinal cord injury, radiation exposure, headache and reaction to medications. Questions were answered to his satisfaction and he agrees to proceed. Voluntary informed consent was obtained and signed.     Vitals were reviewed: Yes  147/98  76 16  Allergies were reviewed:  Yes   Medications were reviewed:  Yes   Pre-procedure pain score: 5/10    Procedure:  After getting informed consent, patient was brought into the procedure suite and was placed in a prone position on the  procedure table.   A Pause for the Cause was performed.  Patient was prepped and draped in sterile fashion.     After identifying the right L4-5 and L5-S1 neuroforamen, the C-arm was rotated to a right lateral oblique angle.  A total of 2 ml of Lidocaine 1% was used to anesthetize the skin and the needle track at both skin entry sites coaxial with the fluoroscopy beam, and overriding the superior aspect of the neuroforamen.  25 gauge 5 inch spinal needles were advanced under intermittent fluoroscopy until they entered the foramen superiorly beneath the pedicles at both levels.    The needle positions were then inspected from anteroposterior and lateral views, and the needles adjusted appropriately.  Aspiration was negative at both levels.  A total of 1 ml of Isovue-300 was injected, confirming appropriate needle positions, with spread into the nerve root sheath and the epidural space, with no intravascular uptake. 14 ml was wasted.    Then, after repeated negative aspiration, each level was injected with 2.5 ml of a combination of Depomedrol 40 mg, Decadron 5 mg, 0.5% bupivacaine 1 ml, diluted with 2.5 ml of normal saline for a total injectate volume of 5 ml and the needles were flushed with lidocaine and removed.    During the procedure, there was not a paresthesia.   Hemostasis was achieved, the area was cleaned, and bandaids were placed when appropriate.  The patient tolerated the procedure well, and was taken to the recovery room.    Images were saved to PACS.    Post-procedure pain score: 1/10  Follow-up includes:   -f/u phone call in one week  -f/u with referring provider    Sanjay Mcpherson MD  [facility]

## 2018-05-16 NOTE — IP AVS SNAPSHOT
[facility]    [address]    [facility] [address]    Phone:  [phone]    Fax:  [phone]                                       After Visit Summary   5/16/2018    Donnie Ernandez    MRN: [id number]           After Visit Summary Signature Page     I have received my discharge instructions, and my questions have been answered. I have discussed any challenges I see with this plan with the nurse or doctor.    ..........................................................................................................................................  Patient/Patient Representative Signature      ..........................................................................................................................................  Patient Representative Print Name and Relationship to Patient    ..................................................               ................................................  Date                                            Time    ..........................................................................................................................................  Reviewed by Signature/Title    ...................................................              ..............................................  Date                                                            Time

## 2018-05-16 NOTE — IP AVS SNAPSHOT
MRN:[id number]                      After Visit Summary   5/16/2018    Donnie Ernandez    MRN: [id number]           Visit Information        Provider Department      5/16/2018  7:45 AM [facility]           Review of your medicines      UNREVIEWED medicines. Ask your doctor about these medicines        Dose / Directions    losartan 100 MG tablet   Commonly known as:  COZAAR   Used for:  Benign essential hypertension        Dose:  100 mg   Take 1 tablet (100 mg) by mouth daily   Quantity:  90 tablet   Refills:  3       metoprolol tartrate 50 MG tablet   Commonly known as:  LOPRESSOR   Used for:  Essential hypertension with goal blood pressure less than 140/90        Dose:  50 mg   Take 1 tablet (50 mg) by mouth 2 times daily   Quantity:  180 tablet   Refills:  3       TYLENOL EXTRA STRENGTH PO        Take by mouth 2 times daily   Refills:  0       zolpidem 12.5 MG CR tablet   Commonly known as:  AMBIEN CR   Used for:  Primary insomnia        Dose:  12.5 mg   Take 1 tablet (12.5 mg) by mouth nightly as needed for sleep   Quantity:  30 tablet   Refills:  5         CONTINUE these medicines which have NOT CHANGED        Dose / Directions    order for DME   Used for:  NILDA (obstructive sleep apnea)        CPAP: 6 cm H2O  CPAP mask fitting  Lifetime need and heated humidity.   Refills:  0                Protect others around you: Learn how to safely use, store and throw away your medicines at www.disposemymeds.org.         Follow-ups after your visit         Care Instructions        Further instructions from your care team       [facility]   Procedure Discharge Instructions    Today you saw:    Dr. Sanjay Mcpherson      You had an: Lumbar Transforaminal Epidural steroid injection        Medications used:  Lidocaine   Marcaine   Decadron Depo-medrol Iso Pedro            Be cautious when walking. Numbness and/or weakness in the lower extremities may occur for up  "to 6-8 hours after the procedure due to effect of the local anesthetic    Do not drive for 6 hours. The effect of the local anesthetic could slow your reflexes.     You may resume your regular activities after 24 hours    Avoid strenuous activity for the first 24 hours    You may shower, however avoid swimming, tub baths or hot tubs for 24 hours following your procedure    You may have a mild to moderate increase in pain for several days following the injection.    It may take up to 14 days for the steroid medication to start working although you may feel the effect as early as a few days after the procedure.       You may use ice packs for 10-15 minutes, 3 to 4 times a day at the injection site for comfort    Do not use heat to painful areas for 6 to 8 hours. This will give the local anesthetic time to wear off and prevent you from accidentally burning your skin.     You may use anti-inflammatory medications (such as Ibuprofen or Aleve or Advil) or Tylenol for pain control if necessary    If you experience any of the following, call the pain center nursing line during work hours at [phone] or the after hours provider line at [phone]:  -Fever over 100 degree F  -Swelling, bleeding, redness, drainage, warmth at the injection site  -Progressive weakness or numbness in your legs  -Loss of bowel or bladder function  -Unusual headache that is not relieved by Tylenol or other pain reliever  -Unusual new onset of pain that is not improving      Phone #s:  Appointment line: [phone];  Nurse line: [phone]             Additional Information About Your Visit        [employer] Information     [employer] lets you send messages to your doctor, view your test results, renew your prescriptions, schedule appointments and more. To sign up, go to [email or web address] . Click on \"Log in\" on the left side of the screen, which will take you to the Welcome page. Then click on \"Sign up Now\" on the right side of the page. "     You will be asked to enter the access code listed below, as well as some personal information. Please follow the directions to create your username and password.     Your access code is: [email or web address]  Expires: 2018  8:01 AM     Your access code will  in 90 days. If you need help or a new code, please call your [place] clinic or [phone].        Care EveryWhere ID     This is your Care EveryWhere ID. This could be used by other organizations to access your [place] medical records  [id number]        Your Vitals Were     Blood Pressure Pulse Respirations             149/76 57 16          Primary Care Provider Office Phone # Fax #    Mae Gurrola Carlos Jones, APRN [facility] [phone]      Equal Access to Services     BARAK GRANT : Hadii riana ballo Soisidro, waaxda luqadaha, qaybta kaalmada adeegyada, marquis travis . So [facility] [phone].    ATENCIÓN: Si habla español, tiene a edmondson disposición servicios gratuitos de asistencia lingüística. Llame al [phone].    We comply with applicable federal civil rights laws and [place] laws. We do not discriminate on the basis of race, color, national origin, age, disability, sex, sexual orientation, or gender identity.            Thank you!     Thank you for choosing [facility] for your care. Our goal is always to provide you with excellent care. Hearing back from our patients is one way we can continue to improve our services. Please take a few minutes to complete the written survey that you may receive in the mail after you visit with us. Thank you!             Medication List: This is a list of all your medications and when to take them. Check marks below indicate your daily home schedule. Keep this list as a reference.      Medications           Morning Afternoon Evening Bedtime As Needed    losartan 100 MG tablet   Commonly known as:  COZAAR   Take 1 tablet (100 mg) by mouth daily                                 metoprolol tartrate 50 MG tablet   Commonly known as:  LOPRESSOR   Take 1 tablet (50 mg) by mouth 2 times daily                                order for DME   CPAP: 6 cm H2O  CPAP mask fitting  Lifetime need and heated humidity.                                TYLENOL EXTRA STRENGTH PO   Take by mouth 2 times daily                                zolpidem 12.5 MG CR tablet   Commonly known as:  AMBIEN CR   Take 1 tablet (12.5 mg) by mouth nightly as needed for sleep

## 2018-05-23 ENCOUNTER — TELEPHONE (OUTPATIENT)
Dept: RADIOLOGY | Facility: CLINIC | Age: 63
End: 2018-05-23

## 2018-05-23 NOTE — TELEPHONE ENCOUNTER
Patient had a  lumbar transforaminal epidural steroid injection at L4-5, L5-S1 on the right, fluoroscopically guided, contrast controlled on 5/16/18.  Called patient for an update.      Left message that we were calling for an update about how s/he was doing after the injection.  LM that if s/he has any problems or questions to call the clinic at [phone].

## 2018-05-24 ENCOUNTER — TELEPHONE (OUTPATIENT)
Dept: PALLIATIVE MEDICINE | Facility: CLINIC | Age: 63
End: 2018-05-24

## 2018-05-24 NOTE — TELEPHONE ENCOUNTER
Received call from patient post-Lumbar TIM. He stated that he did well for the first two days and then was in a lot of pain over the weekend and it has not gotten any better. Pain does go down into his right leg. Phone #[phone]      Mariah Castillo    [facility]

## 2018-05-30 NOTE — TELEPHONE ENCOUNTER
Pt had a R lumbar transforaminal epidural steroid injection on 5/16/18.  He was referred by his primary care provider for an injection only.    Called pt and was told to call his cell at: [phone]  Called cell phone and left message informing him to f/u with his primary care provider for further options.    Will keep encounter open for a couple of days in anticipation of patient call back.     Carmelina Price RN-BSN  [facility]

## 2018-06-18 DIAGNOSIS — F51.01 PRIMARY INSOMNIA: ICD-10-CM

## 2018-06-18 RX ORDER — ZOLPIDEM TARTRATE 12.5 MG/1
12.5 TABLET, FILM COATED, EXTENDED RELEASE ORAL
Qty: 30 TABLET | Refills: 5 | Status: SHIPPED | OUTPATIENT
Start: 2018-06-18 | End: 2018-11-07

## 2018-06-18 NOTE — TELEPHONE ENCOUNTER
Rx. Signed and faxed to [facility] [phone], patient notified.  [facility] [place] [facility]

## 2018-06-18 NOTE — TELEPHONE ENCOUNTER
Requested Prescriptions   Pending Prescriptions Disp Refills     zolpidem (AMBIEN CR) 12.5 MG CR tablet  Last Written Prescription Date:  12/13/17  Last Fill Quantity: 30,  # refills: 5   Last office visit: 5/8/2018 with prescribing provider:  05/08/18   Future Office Visit:     30 tablet 5     Sig: Take 1 tablet (12.5 mg) by mouth nightly as needed for sleep    There is no refill protocol information for this order

## 2018-06-25 ENCOUNTER — TELEPHONE (OUTPATIENT)
Dept: FAMILY MEDICINE | Facility: CLINIC | Age: 63
End: 2018-06-25

## 2018-06-25 NOTE — TELEPHONE ENCOUNTER
Panel Management Review      Patient has the following on his problem list:     Hypertension   Last three blood pressure readings:  BP Readings from Last 3 Encounters:   05/16/18 156/75   05/08/18 153/73   02/07/18 160/80     Blood pressure: FAILED    HTN Guidelines:  Age 18-59 BP range:  Less than 140/90  Age 60-85 with Diabetes:  Less than 140/90  Age 60-85 without Diabetes:  less than 150/90      Composite cancer screening  Chart review shows that this patient is due/due soon for the following None  Summary:    Patient is due/failing the following:   BP CHECK    Action needed:   Patient needs nurse only appointment.    Type of outreach:    Phone, spoke with patient, given providers recommendations, he will call back to schedule a nurse only bp check.    Questions for provider review:    None                                                                                                                                    ASHVIN Orona MA

## 2018-06-25 NOTE — TELEPHONE ENCOUNTER
Losartan was increased last month.  Please ask patient to come in for an RN recheck.  Mae Jones, CNP

## 2018-07-05 ENCOUNTER — ALLIED HEALTH/NURSE VISIT (OUTPATIENT)
Dept: FAMILY MEDICINE | Facility: CLINIC | Age: 63
End: 2018-07-05
Payer: COMMERCIAL

## 2018-07-05 ENCOUNTER — TELEPHONE (OUTPATIENT)
Dept: FAMILY MEDICINE | Facility: CLINIC | Age: 63
End: 2018-07-05

## 2018-07-05 VITALS — HEART RATE: 54 BPM | SYSTOLIC BLOOD PRESSURE: 136 MMHG | OXYGEN SATURATION: 98 % | DIASTOLIC BLOOD PRESSURE: 78 MMHG

## 2018-07-05 DIAGNOSIS — I10 ESSENTIAL HYPERTENSION, BENIGN: Primary | ICD-10-CM

## 2018-07-05 PROCEDURE — 99207 ZZC NO CHARGE NURSE ONLY: CPT

## 2018-07-05 NOTE — TELEPHONE ENCOUNTER
Donnie Ernandez is a 62 year old year old patient who comes in today for a Blood Pressure check because of ongoing blood pressure monitoring & losartan was increased 5/8/18.  Vital Signs as repeated by RN:    Vitals:    07/05/18 0855 07/05/18 0858   BP: 136/71 136/78   BP Location: Right arm Left arm   Patient Position: Sitting Sitting   Cuff Size: Adult Regular Adult Regular   Pulse: 54    SpO2: 98%        Patient is taking medication as prescribed  Patient is tolerating medications well.  Patient is not monitoring Blood Pressure at home.    Current complaints: none and denies  Disposition:  Routed to provider.    Mckenzie ERICKSON RN

## 2018-07-05 NOTE — MR AVS SNAPSHOT
After Visit Summary   7/5/2018    Donnie Ernandez    MRN: [id number]           Patient Information     Date Of Birth          1955        Visit Information        Provider Department      7/5/2018 9:00 AM MOOSE RASHID/TIMBO MEDINA [facility]        Today's Diagnoses     Essential hypertension, benign    -  1       Follow-ups after your visit        Who to contact     If you have questions or need follow up information about today's clinic visit or your schedule please contact [facility] directly at [phone].  Normal or non-critical lab and imaging results will be communicated to you by MyChart, letter or phone within 4 business days after the clinic has received the results. If you do not hear from us within 7 days, please contact the clinic through MyChart or phone. If you have a critical or abnormal lab result, we will notify you by phone as soon as possible.  Submit refill requests through [employer] or call your pharmacy and they will forward the refill request to us. Please allow 3 business days for your refill to be completed.          Additional Information About Your Visit        Care EveryWhere ID     This is your Care EveryWhere ID. This could be used by other organizations to access your [place] medical records  [id number]        Your Vitals Were     Pulse Pulse Oximetry                54 98%           Blood Pressure from Last 3 Encounters:   07/05/18 136/78   05/16/18 156/75   05/08/18 153/73    Weight from Last 3 Encounters:   05/08/18 171 lb (77.6 kg)   02/07/18 178 lb 9.6 oz (81 kg)   11/20/17 179 lb (81.2 kg)              Today, you had the following     No orders found for display       Primary Care Provider Office Phone # Fax #    MaeMAYANK Saul [phone] [address] [address]        Equal Access to Services     BARAK GRANT AH: veda Thrasher, sherrell goldstein,  marquis cabayahaira annitadavid alfaro'aan ah. So [facility] [phone].    ATENCIÓN: Si habla sandi, tiene a edmondson disposición servicios gratuitos de asistencia lingüística. Melanie sanchez [phone].    We comply with applicable federal civil rights laws and [place] laws. We do not discriminate on the basis of race, color, national origin, age, disability, sex, sexual orientation, or gender identity.            Thank you!     Thank you for choosing [facility]  for your care. Our goal is always to provide you with excellent care. Hearing back from our patients is one way we can continue to improve our services. Please take a few minutes to complete the written survey that you may receive in the mail after your visit with us. Thank you!             Your Updated Medication List - Protect others around you: Learn how to safely use, store and throw away your medicines at www.disposemymeds.org.          This list is accurate as of 7/5/18  9:00 AM.  Always use your most recent med list.                   Brand Name Dispense Instructions for use Diagnosis    losartan 100 MG tablet    COZAAR    90 tablet    Take 1 tablet (100 mg) by mouth daily    Benign essential hypertension       metoprolol tartrate 50 MG tablet    LOPRESSOR    180 tablet    Take 1 tablet (50 mg) by mouth 2 times daily    Essential hypertension with goal blood pressure less than 140/90       order for DME      CPAP: 6 cm H2O  CPAP mask fitting  Lifetime need and heated humidity.    NILDA (obstructive sleep apnea)       TYLENOL EXTRA STRENGTH PO      Take by mouth 2 times daily        zolpidem 12.5 MG CR tablet    AMBIEN CR    30 tablet    Take 1 tablet (12.5 mg) by mouth nightly as needed for sleep    Primary insomnia

## 2018-08-13 ENCOUNTER — TELEPHONE (OUTPATIENT)
Dept: FAMILY MEDICINE | Facility: CLINIC | Age: 63
End: 2018-08-13

## 2018-08-13 DIAGNOSIS — M54.16 LUMBAR RADICULOPATHY: Primary | ICD-10-CM

## 2018-08-13 NOTE — TELEPHONE ENCOUNTER
Patient is willing to wait till PCP can review, rerouting to PCP to review request for MRI.    CAROL Galvan

## 2018-08-13 NOTE — TELEPHONE ENCOUNTER
Spoke with patient who is saying that he continues to have right hip pain that radiates to right thigh, knee, and calf that he says is the same as what he was seen for in May, patient has history of herniated disc.  Says that he had a TIM shot in May and states that the pain is not improved since then and is not thinking that the shots are beneficial. Says the pain affects his mobility and functionality at work, reports that he can stand but dealing with the pain using tylenol and it is still ongoing for him. Reports left hand tingling that happens when he flexes wrist, denies headache, no one sided weakness, no shortness of breath or chest pain and speech is clear. Patient is wondering if an MRI would be advised. Offered an appointment with PCP but patient declines till he sees what is recommended first. Please advise.    CAROL Galvan

## 2018-08-13 NOTE — TELEPHONE ENCOUNTER
Unfortunately PCP is out of office - he can wait until she returns and see if she is willing to order the MRI without seeing him for follow up.    Otherwise I am not comfortable ordering without seeing patient - given last visit was in May.  Last MRI was 2015.    CARLA Tuttle

## 2018-08-13 NOTE — TELEPHONE ENCOUNTER
"Reason for call:  Patient reporting a symptom    Symptom or request: lower back and right hip continues to hurt after pain shots, he is unsure how long ago he had the shots. Patient feels its the same pain as when he had herniated disk.    Duration (how long have symptoms been present): \"a while\"    Have you been treated for this before? Yes    Additional comments: patient would like a referral to what to do next, MRI or ?  Phone Number patient can be reached at:  [phone]    Best Time:  any    Can we leave a detailed message on this number:  YES    Call taken on 8/13/2018 at 8:19 AM by Roya Vergara        "

## 2018-08-17 NOTE — TELEPHONE ENCOUNTER
Patient notified of MRI scheduled by provider  Patient verbalized understanding   Transferred patient to appointment phone line to schedule appt for MRI    Sandee WILLIAMSON Rn

## 2018-08-19 ENCOUNTER — HOSPITAL ENCOUNTER (OUTPATIENT)
Dept: MRI IMAGING | Facility: CLINIC | Age: 63
Discharge: HOME | End: 2018-08-19
Attending: NURSE PRACTITIONER | Admitting: NURSE PRACTITIONER
Payer: COMMERCIAL

## 2018-08-19 DIAGNOSIS — M54.16 LUMBAR RADICULOPATHY: ICD-10-CM

## 2018-08-19 PROCEDURE — 25000128 H RX IP 250 OP 636: Performed by: NURSE PRACTITIONER

## 2018-08-19 PROCEDURE — 72158 MRI LUMBAR SPINE W/O & W/DYE: CPT

## 2018-08-19 PROCEDURE — A9585 GADOBUTROL INJECTION: HCPCS | Performed by: NURSE PRACTITIONER

## 2018-08-19 RX ORDER — GADOBUTROL 604.72 MG/ML
7 INJECTION INTRAVENOUS ONCE
Status: COMPLETED | OUTPATIENT
Start: 2018-08-19 | End: 2018-08-19

## 2018-08-19 RX ADMIN — GADOBUTROL 7 ML: 604.72 INJECTION INTRAVENOUS at 07:59

## 2018-08-20 ENCOUNTER — TELEPHONE (OUTPATIENT)
Dept: FAMILY MEDICINE | Facility: CLINIC | Age: 63
End: 2018-08-20

## 2018-08-20 DIAGNOSIS — M54.16 LUMBAR RADICULOPATHY: Primary | ICD-10-CM

## 2018-08-20 NOTE — TELEPHONE ENCOUNTER
Reason for Call:  Request for results:    Name of test or procedure: MRI     Date of test of procedure: 8/19    [place] [facility] [place] to leave the result message on voice mail or with a family member? YES    Phone number Patient can be reached at:  Home number on file [phone] (home)    Additional comments: patient picked up a copy of the results but needs help understanding them     Call taken on 8/20/2018 at 8:48 AM by Roya Vergara

## 2018-08-20 NOTE — TELEPHONE ENCOUNTER
Patient had questions about his MRI results  Advised patient on what stenosis was   Patient verbalized understanding  Patient given phone number to schedule Ortho appt [phone]    Sandee WILLIAMSON Rn

## 2018-08-26 ENCOUNTER — HOSPITAL ENCOUNTER (OUTPATIENT)
Dept: MRI IMAGING | Facility: CLINIC | Age: 63
Discharge: HOME | End: 2018-08-26
Attending: ORTHOPAEDIC SURGERY | Admitting: ORTHOPAEDIC SURGERY
Payer: COMMERCIAL

## 2018-08-26 ENCOUNTER — HOSPITAL ENCOUNTER (OUTPATIENT)
Dept: MRI IMAGING | Facility: CLINIC | Age: 63
End: 2018-08-26
Attending: ORTHOPAEDIC SURGERY
Payer: COMMERCIAL

## 2018-08-26 DIAGNOSIS — M54.2 NECK PAIN: ICD-10-CM

## 2018-08-26 PROCEDURE — 72141 MRI NECK SPINE W/O DYE: CPT

## 2018-08-26 PROCEDURE — 72146 MRI CHEST SPINE W/O DYE: CPT

## 2018-09-19 ENCOUNTER — TRANSFERRED RECORDS (OUTPATIENT)
Dept: HEALTH INFORMATION MANAGEMENT | Facility: CLINIC | Age: 63
End: 2018-09-19

## 2018-11-07 ENCOUNTER — OFFICE VISIT (OUTPATIENT)
Dept: FAMILY MEDICINE | Facility: CLINIC | Age: 63
End: 2018-11-07
Payer: COMMERCIAL

## 2018-11-07 VITALS
SYSTOLIC BLOOD PRESSURE: 130 MMHG | DIASTOLIC BLOOD PRESSURE: 70 MMHG | OXYGEN SATURATION: 98 % | BODY MASS INDEX: 27.48 KG/M2 | HEART RATE: 60 BPM | HEIGHT: 66 IN | TEMPERATURE: 99.6 F | WEIGHT: 171 LBS

## 2018-11-07 DIAGNOSIS — J06.9 VIRAL URI: ICD-10-CM

## 2018-11-07 DIAGNOSIS — J02.9 SORE THROAT: ICD-10-CM

## 2018-11-07 DIAGNOSIS — F51.01 PRIMARY INSOMNIA: Primary | ICD-10-CM

## 2018-11-07 LAB
DEPRECATED S PYO AG THROAT QL EIA: NORMAL
SPECIMEN SOURCE: NORMAL

## 2018-11-07 PROCEDURE — 99214 OFFICE O/P EST MOD 30 MIN: CPT | Performed by: NURSE PRACTITIONER

## 2018-11-07 PROCEDURE — 87081 CULTURE SCREEN ONLY: CPT | Performed by: NURSE PRACTITIONER

## 2018-11-07 PROCEDURE — 87880 STREP A ASSAY W/OPTIC: CPT | Performed by: NURSE PRACTITIONER

## 2018-11-07 RX ORDER — ZOLPIDEM TARTRATE 12.5 MG/1
12.5 TABLET, FILM COATED, EXTENDED RELEASE ORAL
Qty: 30 TABLET | Refills: 5 | Status: SHIPPED | OUTPATIENT
Start: 2018-12-18 | End: 2019-05-17

## 2018-11-07 NOTE — LETTER
November 8, 2018      Donnie Ernandez  [address] [place] [id number]        The results of your recent throat culture were negative.  If you have any further questions or concerns please contact the clinic.            Sincerely,        MAYANK Pitt CNP

## 2018-11-07 NOTE — LETTER
[facility]  [address] [address]  [phone]          November 7, 2018    RE:  Donnie Ernandez                                                                                                                                                       [address] [facility] [address]            To whom it may concern:    Donnie Ernandez was seen in my clinic today for an acute illness. Please excuse him from work November 6 and 7.        Sincerely,            Mae Jones, CNP

## 2018-11-07 NOTE — MR AVS SNAPSHOT
After Visit Summary   11/7/2018    Donnie Ernandez    MRN: [id number]           Patient Information     Date Of Birth          1955        Visit Information        Provider Department      11/7/2018 10:00 AM Mae Jones APRN CNP [facility]        Today's Diagnoses     Primary insomnia    -  1    Viral URI        Sore throat          Care Instructions          Thank you for choosing [facility].  You may be receiving a survey in the mail from [facility] regarding your visit today.  Please take a few minutes to complete and return the survey to let us know how we are doing.      If you have questions or concerns, please contact us via [email or web address] or you can contact your care team at [phone].    Our Clinic hours are:  Monday 6:40 am  to 7:00 pm  Tuesday -Friday 6:40 am to 5:00 pm    The [place] outpatient lab hours are:  Monday - Friday 6:10 am to 4:45 pm  Saturdays 7:00 am to 11:00 am  Appointments are required, call [phone]    If you have clinical questions after hours or would like to schedule an appointment,  call the clinic at [phone].            Follow-ups after your visit        Follow-up notes from your care team     Return in about 2 weeks (around 11/21/2018), or if symptoms worsen or fail to improve.      Your next 10 appointments already scheduled     Nov 12, 2018  8:00 AM CST   Ortho Eval with Faustina Carpenter PT   [facility] ([facility])    [address]  [address]  [facility] [place], [place] [id number] [phone]            Jan 14, 2019  8:00 AM CST   New Visit with Mela Garcias MD   [facility] ([facility]    [address] [id number] [phone]              Who to contact     If you have questions or need follow up information about today's clinic visit or your schedule please contact [facility] directly at [phone].  Normal  "or non-critical lab and imaging results will be communicated to you by MyChart, letter or phone within 4 business days after the clinic has received the results. If you do not hear from us within 7 days, please contact the clinic through MyChart or phone. If you have a critical or abnormal lab result, we will notify you by phone as soon as possible.  Submit refill requests through [employer] or call your pharmacy and they will forward the refill request to us. Please allow 3 business days for your refill to be completed.          Additional Information About Your Visit        Care EveryWhere ID     This is your Care EveryWhere ID. This could be used by other organizations to access your [facility] medical records  [id number]        Your Vitals Were     Pulse Temperature Height Pulse Oximetry BMI (Body Mass Index)       60 99.6  F (37.6  C) (Tympanic) 5' 6\" (1.676 m) 98% 27.6 kg/m2        Blood Pressure from Last 3 Encounters:   11/07/18 130/70   07/05/18 136/78   05/16/18 156/75    Weight from Last 3 Encounters:   11/07/18 171 lb (77.6 kg)   05/08/18 171 lb (77.6 kg)   02/07/18 178 lb 9.6 oz (81 kg)              We Performed the Following     Beta strep group A culture     Rapid strep screen          Today's Medication Changes          These changes are accurate as of 11/7/18 10:38 AM.  If you have any questions, ask your nurse or doctor.               These medicines have changed or have updated prescriptions.        Dose/Directions    zolpidem 12.5 MG CR tablet   Commonly known as:  AMBIEN PREET   This may have changed:  These instructions start on 12/18/2018. If you are unsure what to do until then, ask your doctor or other care provider.   Used for:  Primary insomnia   Changed by:  Mae Jones APRN CNP        Dose:  12.5 mg   Start taking on:  12/18/2018   Take 1 tablet (12.5 mg) by mouth nightly as needed for sleep   Quantity:  30 tablet   Refills:  5            Where to get your medicines      Some " of these will need a paper prescription and others can be bought over the counter.  Ask your nurse if you have questions.     Bring a paper prescription for each of these medications     zolpidem 12.5 MG CR tablet                Primary Care Provider Office Phone # Fax #    MAYANK Cole [phone] [address] [address]        Equal Access to Services     BARAK GRANT : Hadii aad ku hadasho Soomaali, waaxda luqadaha, qaybta kaalmada adeegyada, waxay idiin hayaan adeeg khdhruvpadmini travis . So [place] [phone].    ATENCIÓN: Si gregoryla espmina, tiene a edmondson disposición servicios gratuitos de asistencia lingüística. Llame al [phone].    We comply with applicable federal civil rights laws and [place] laws. We do not discriminate on the basis of race, color, national origin, age, disability, sex, sexual orientation, or gender identity.            Thank you!     Thank you for choosing [facility]  for your care. Our goal is always to provide you with excellent care. Hearing back from our patients is one way we can continue to improve our services. Please take a few minutes to complete the written survey that you may receive in the mail after your visit with us. Thank you!             Your Updated Medication List - Protect others around you: Learn how to safely use, store and throw away your medicines at www.disposemymeds.org.          This list is accurate as of 11/7/18 10:38 AM.  Always use your most recent med list.                   Brand Name Dispense Instructions for use Diagnosis    fluticasone 50 MCG/ACT spray    FLONASE    16 g    SPRAY 1 SPRAY IN EACH NOSTRIL TWO TIMES A DAY    Viral sinusitis       losartan 100 MG tablet    COZAAR    90 tablet    Take 1 tablet (100 mg) by mouth daily    Benign essential hypertension       metoprolol tartrate 50 MG tablet    LOPRESSOR    180 tablet    Take 1 tablet (50 mg) by mouth 2 times daily    Essential  hypertension with goal blood pressure less than 140/90       order for DME      CPAP: 6 cm H2O  CPAP mask fitting  Lifetime need and heated humidity.    NILDA (obstructive sleep apnea)       TYLENOL EXTRA STRENGTH PO      Take by mouth 2 times daily        zolpidem 12.5 MG CR tablet   Start taking on:  12/18/2018    AMBIEN CR    30 tablet    Take 1 tablet (12.5 mg) by mouth nightly as needed for sleep    Primary insomnia

## 2018-11-07 NOTE — PATIENT INSTRUCTIONS
Thank you for choosing [facility].  You may be receiving a survey in the mail from [facility] regarding your visit today.  Please take a few minutes to complete and return the survey to let us know how we are doing.      If you have questions or concerns, please contact us via [employer] or you can contact your care team at [phone].    Our Clinic hours are:  Monday 6:40 am  to 7:00 pm  Tuesday -Friday 6:40 am to 5:00 pm    The [place] outpatient lab hours are:  Monday - Friday 6:10 am to 4:45 pm  Saturdays 7:00 am to 11:00 am  Appointments are required, call [phone]    If you have clinical questions after hours or would like to schedule an appointment,  call the clinic at [phone].

## 2018-11-08 LAB
BACTERIA SPEC CULT: NORMAL
SPECIMEN SOURCE: NORMAL

## 2018-12-31 ENCOUNTER — TELEPHONE (OUTPATIENT)
Dept: NEUROLOGY | Facility: CLINIC | Age: 63
End: 2018-12-31

## 2018-12-31 NOTE — TELEPHONE ENCOUNTER
FUTURE VISIT INFORMATION        FUTURE VISIT INFORMATION:    Date: 1/2/19    Time:  1330    Location: [place] [facility]   REFERRAL INFORMATION:    Referring provider:  Edmundo Townsend MD    Referring providers clinic:  [facility]    Reason for visit/diagnosis:  Abnormal reflexes (per scheduling note)        NOTES (FOR ALL VISITS) STATUS DETAILS   OFFICE NOTE from referring provider Ordered    OFFICE NOTE from other specialist Printed  Mae Jones CNP (FP)  Sanjay Mcpherson MD (Pain)   DISCHARGE SUMMARY from hospital      DISCHARGE REPORT from the ER     OPERATIVE REPORT      MEDICATION LIST      IMAGING  (FOR ALL VISITS)       EMG     EEG      MRI (HEAD, NECK, SPINE) Printed/ images in PACs  lumbar 8/19/18  Cervical 8/26/18  Thoracic 8/26/18               OTHER

## 2019-01-02 ENCOUNTER — OFFICE VISIT (OUTPATIENT)
Dept: NEUROLOGY | Facility: CLINIC | Age: 64
End: 2019-01-02
Payer: COMMERCIAL

## 2019-01-02 VITALS
WEIGHT: 179 LBS | RESPIRATION RATE: 16 BRPM | TEMPERATURE: 98.6 F | SYSTOLIC BLOOD PRESSURE: 136 MMHG | DIASTOLIC BLOOD PRESSURE: 73 MMHG | BODY MASS INDEX: 28.89 KG/M2 | HEART RATE: 76 BPM

## 2019-01-02 DIAGNOSIS — M54.16 LUMBAR RADICULOPATHY, RIGHT: ICD-10-CM

## 2019-01-02 DIAGNOSIS — R29.2 HYPERREFLEXIA: Primary | ICD-10-CM

## 2019-01-02 LAB — VIT B12 SERPL-MCNC: 336 PG/ML (ref 193–986)

## 2019-01-02 PROCEDURE — 36415 COLL VENOUS BLD VENIPUNCTURE: CPT | Performed by: PSYCHIATRY & NEUROLOGY

## 2019-01-02 PROCEDURE — 99244 OFF/OP CNSLTJ NEW/EST MOD 40: CPT | Performed by: PSYCHIATRY & NEUROLOGY

## 2019-01-02 PROCEDURE — 82607 VITAMIN B-12: CPT | Performed by: PSYCHIATRY & NEUROLOGY

## 2019-01-02 PROCEDURE — 99000 SPECIMEN HANDLING OFFICE-LAB: CPT | Performed by: PSYCHIATRY & NEUROLOGY

## 2019-01-02 PROCEDURE — 83921 ORGANIC ACID SINGLE QUANT: CPT | Mod: 90 | Performed by: PSYCHIATRY & NEUROLOGY

## 2019-01-02 PROCEDURE — 84207 ASSAY OF VITAMIN B-6: CPT | Mod: 90 | Performed by: PSYCHIATRY & NEUROLOGY

## 2019-01-02 ASSESSMENT — PAIN SCALES - GENERAL: PAINLEVEL: MODERATE PAIN (4)

## 2019-01-02 NOTE — LETTER
[facility]  [address] [address]  [phone]          January 7, 2019    Donnie Ernandez                                                                                                                     [address] [place] [id number]          Dear Dr. Katerine Fields has reviewed the results of your labs of 1/4/19, and has made the following observations:      Lab results were normal except the vitamin B12 level which is in the lower range. I recommend a daily vitamin B12 supplement: 1000mcg per day. We will follow-up as planned, after the additional tests are completed.     Please don't hesitate to contact our office with any additional questions or concerns.         Sincerely,     ALLISON Harding  For Mela Garcias MD

## 2019-01-02 NOTE — PATIENT INSTRUCTIONS
Plan:    Labs today: B12/MMA, B6.  Brain MRI.   Referral for nerve conduction studies/EMG for the lower back/leg pain and possible neuropathy.  We will notify you of the results of the above studies and recommendations for further evaluation if necessary.   I do recommend you do some physical therapy for the lower back pain if you haven't already (it looks like Dr. Lyle recommended this back in September.
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
## 2019-01-02 NOTE — LETTER
2019      Regarding: Donnie Ernandez  [address] [place] [address]  : 59        To Whom It May Concern:        Donnie Ernandez  was seen in clinic today.  Please excuse his absence from work.      Thank you for your consideration.            Sincerely,        Mela Garcias MD
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
## 2019-01-03 LAB — METHYLMALONATE SERPL-SCNC: 0.18 UMOL/L (ref 0–0.4)

## 2019-01-04 LAB — VIT B6 SERPL-MCNC: 33.6 NMOL/L (ref 20–125)

## 2019-01-04 NOTE — RESULT ENCOUNTER NOTE
Please advise Donnie Ernandez,  1955, that his lab results were normal except the vitamin B12 level which is in the lower range. I recommend a daily B12 supplement: 1000mcg per day. We will follow-up as planned, after the additional tests are completed.   [phone] (home) [phone] (work)  Mela Garcias

## 2019-01-24 DIAGNOSIS — I10 ESSENTIAL HYPERTENSION WITH GOAL BLOOD PRESSURE LESS THAN 140/90: ICD-10-CM

## 2019-01-24 RX ORDER — METOPROLOL TARTRATE 50 MG
50 TABLET ORAL 2 TIMES DAILY
Qty: 180 TABLET | Refills: 2 | Status: SHIPPED | OUTPATIENT
Start: 2019-01-24 | End: 2019-06-26

## 2019-01-24 NOTE — TELEPHONE ENCOUNTER
Prescription approved per [facility] Refill Protocol.  Юлия Briscoe RNC

## 2019-01-24 NOTE — TELEPHONE ENCOUNTER
"Requested Prescriptions   Pending Prescriptions Disp Refills     metoprolol tartrate (LOPRESSOR) 50 MG tablet 180 tablet 3     Sig: Take 1 tablet (50 mg) by mouth 2 times daily    Beta-Blockers Protocol Passed - 1/24/2019  1:59 PM       Passed - Blood pressure under 140/90 in past 12 months    BP Readings from Last 3 Encounters:   01/02/19 136/73   11/07/18 130/70   07/05/18 136/78                Passed - Patient is age 6 or older       Passed - Recent (12 mo) or future (30 days) visit within the authorizing provider's specialty    Patient had office visit in the last 12 months or has a visit in the next 30 days with authorizing provider or within the authorizing provider's specialty.  See \"Patient Info\" tab in inbasket, or \"Choose Columns\" in Meds & Orders section of the refill encounter.             Passed - Medication is active on med list        Last Written Prescription Date:  7/25/17  Last Fill Quantity: 180,  # refills: 3   Last office visit: 11/7/2018 with prescribing provider:  Robert   Future Office Visit:      "

## 2019-02-05 ENCOUNTER — HOSPITAL ENCOUNTER (EMERGENCY)
Facility: CLINIC | Age: 64
Discharge: HOME | End: 2019-02-05
Attending: FAMILY MEDICINE | Admitting: FAMILY MEDICINE
Payer: OTHER MISCELLANEOUS

## 2019-02-05 ENCOUNTER — APPOINTMENT (OUTPATIENT)
Dept: CT IMAGING | Facility: CLINIC | Age: 64
End: 2019-02-05
Attending: FAMILY MEDICINE
Payer: OTHER MISCELLANEOUS

## 2019-02-05 VITALS
SYSTOLIC BLOOD PRESSURE: 153 MMHG | RESPIRATION RATE: 18 BRPM | OXYGEN SATURATION: 95 % | WEIGHT: 175 LBS | DIASTOLIC BLOOD PRESSURE: 74 MMHG | TEMPERATURE: 98.2 F | HEIGHT: 66 IN | BODY MASS INDEX: 28.12 KG/M2

## 2019-02-05 DIAGNOSIS — S20.211A CONTUSION OF RIGHT CHEST WALL, INITIAL ENCOUNTER: ICD-10-CM
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
## 2019-02-05 PROCEDURE — 99285 EMERGENCY DEPT VISIT HI MDM: CPT | Mod: 25 | Performed by: FAMILY MEDICINE

## 2019-02-05 PROCEDURE — 74177 CT ABD & PELVIS W/CONTRAST: CPT

## 2019-02-05 PROCEDURE — 99284 EMERGENCY DEPT VISIT MOD MDM: CPT | Mod: Z6 | Performed by: FAMILY MEDICINE

## 2019-02-05 PROCEDURE — 25000128 H RX IP 250 OP 636: Performed by: FAMILY MEDICINE

## 2019-02-05 PROCEDURE — 25000125 ZZHC RX 250: Performed by: FAMILY MEDICINE

## 2019-02-05 PROCEDURE — 71260 CT THORAX DX C+: CPT

## 2019-02-05 PROCEDURE — 96374 THER/PROPH/DIAG INJ IV PUSH: CPT | Mod: 59 | Performed by: FAMILY MEDICINE

## 2019-02-05 RX ORDER — OXYCODONE HYDROCHLORIDE 5 MG/1
5 TABLET ORAL EVERY 6 HOURS PRN
Qty: 12 TABLET | Refills: 0 | Status: SHIPPED | OUTPATIENT
Start: 2019-02-05 | End: 2019-02-11

## 2019-02-05 RX ORDER — IOPAMIDOL 755 MG/ML
85 INJECTION, SOLUTION INTRAVASCULAR ONCE
Status: COMPLETED | OUTPATIENT
Start: 2019-02-05 | End: 2019-02-05

## 2019-02-05 RX ORDER — KETOROLAC TROMETHAMINE 15 MG/ML
15 INJECTION, SOLUTION INTRAMUSCULAR; INTRAVENOUS ONCE
Status: COMPLETED | OUTPATIENT
Start: 2019-02-05 | End: 2019-02-05

## 2019-02-05 RX ADMIN — KETOROLAC TROMETHAMINE 15 MG: 15 INJECTION, SOLUTION INTRAMUSCULAR; INTRAVENOUS at 09:30

## 2019-02-05 RX ADMIN — SODIUM CHLORIDE 61 ML: 9 INJECTION, SOLUTION INTRAVENOUS at 09:39

## 2019-02-05 RX ADMIN — IOPAMIDOL 85 ML: 755 INJECTION, SOLUTION INTRAVENOUS at 09:38

## 2019-02-05 ASSESSMENT — MIFFLIN-ST. JEOR: SCORE: 1536.54
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
## 2019-02-05 NOTE — DISCHARGE INSTRUCTIONS
See work restrictions.  Avoid aggravating activities.  Schedule follow-up next week for recheck.  Use acetaminophen 500 mg 1-2 pills up to every 4 hours if needed for pain.   You may add oxycodone 5 mg, 1-2 tablets up to every 6 hours if needed for pain.  Try to use this primarily only at night to help with sleep.    Do not use alcohol, operate machinery, drive, or climb on ladders for 8 hours after taking oxycodone. Use docusate (100mg) 2 times a day to prevent constipation while on narcotics.  Follow-up with your primary care physician regarding minor abnormalities on CT.

## 2019-02-05 NOTE — ED AVS SNAPSHOT
[facility] Emergency Department  [address] [address]  Phone:  [phone]  Fax:  [phone]                                    Donnie Ernandez   MRN: [id number]    Department:  [facility] Emergency Department   Date of Visit:  2/5/2019           After Visit Summary Signature Page    I have received my discharge instructions, and my questions have been answered. I have discussed any challenges I see with this plan with the nurse or doctor.    ..........................................................................................................................................  Patient/Patient Representative Signature      ..........................................................................................................................................  Patient Representative Print Name and Relationship to Patient    ..................................................               ................................................  Date                                   Time    ..........................................................................................................................................  Reviewed by Signature/Title    ...................................................              ..............................................  Date                                               Time          22EPIC Rev 08/18

## 2019-02-11 ENCOUNTER — OFFICE VISIT (OUTPATIENT)
Dept: FAMILY MEDICINE | Facility: CLINIC | Age: 64
End: 2019-02-11
Payer: OTHER MISCELLANEOUS

## 2019-02-11 VITALS
OXYGEN SATURATION: 97 % | DIASTOLIC BLOOD PRESSURE: 68 MMHG | BODY MASS INDEX: 28.73 KG/M2 | HEART RATE: 58 BPM | SYSTOLIC BLOOD PRESSURE: 139 MMHG | WEIGHT: 178 LBS | TEMPERATURE: 99.2 F | RESPIRATION RATE: 16 BRPM

## 2019-02-11 DIAGNOSIS — S20.211D CONTUSION OF RIGHT CHEST WALL, SUBSEQUENT ENCOUNTER: ICD-10-CM
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
## 2019-02-11 DIAGNOSIS — R91.8 PULMONARY NODULES: ICD-10-CM

## 2019-02-11 PROCEDURE — 99213 OFFICE O/P EST LOW 20 MIN: CPT | Performed by: NURSE PRACTITIONER

## 2019-02-11 RX ORDER — OXYCODONE HYDROCHLORIDE 5 MG/1
5 TABLET ORAL EVERY 6 HOURS PRN
Qty: 12 TABLET | Refills: 0 | Status: SHIPPED | OUTPATIENT
Start: 2019-02-11 | End: 2019-06-26

## 2019-02-11 NOTE — PROGRESS NOTES
SUBJECTIVE:   Donnie Ernandez is a 63 year old male who presents to clinic today for the following health issues:      ED/UC Followup:  Work related injury    Facility:  [facility]  Date of visit: 2/5/2019  Reason for visit: fall on ice-  Rib pain  Current Status: patient still c/o right sided posterior rib pain/ flank area  No radicular symptoms  Has muscle cramps in hamstring area at night  Using tylenol for pain.  Out of the oxycodone.  Pain with bending and twisting.  Pain with heavy lifting.  No shortness of breath.  Difficulty sleeping due to the pain - can't find a comfortable position.    Also hit elbow - was bruised - now improved.     ER - CT of Chest/abd/pelvis - negative.      Problem list and histories reviewed & adjusted, as indicated.  Additional history: as documented      Reviewed and updated as needed this visit by clinical staff  Tobacco  Allergies  Meds       Reviewed and updated as needed this visit by Provider         ROS:  Constitutional, HEENT, cardiovascular, pulmonary, gi and gu systems are negative, except as otherwise noted.    OBJECTIVE:     /68 (BP Location: Right arm)   Pulse 58   Temp 99.2  F (37.3  C) (Tympanic)   Resp 16   Wt 80.7 kg (178 lb)   SpO2 97%   BMI 28.73 kg/m     Body mass index is 28.73 kg/m .  GENERAL: healthy, alert and no distress  RESP: lungs clear to auscultation - no rales, rhonchi or wheezes  CV: regular rate and rhythm, normal S1 S2, no S3 or S4, no murmur, click or rub, no peripheral edema and peripheral pulses strong  MS: tenderness over the right posterior lower ribs.  Elbow - normal appearance, ROM normal. No tenderness to palpation.    ASSESSMENT/PLAN:       ICD-10-CM    1. Fall, subsequent encounter W19.XXXD oxyCODONE (ROXICODONE) 5 MG tablet   2. Contusion of right chest wall, subsequent encounter S20.211D oxyCODONE (ROXICODONE) 5 MG tablet       Patient Instructions   No heavy lifting.  Tylenol 1000 mg every 8 hours.  Oxycodone one  tablet at bedtime if needed - no driving or alcohol with this medication.    Letter written with work restrictions.    The risks, benefits and treatment options of prescribed medications or other treatments have been discussed with the patient. The patient verbalized their understanding and should call or follow up if no improvement or if they develop further problems.    MAYANK Pitt [facility]

## 2019-02-11 NOTE — LETTER
[facility]  [address] [address]  [phone]          February 11, 2019    RE:  Donnie Ernandez                                                                                                                                                       [address] [place] [address]            To whom it may concern:    Donnie Ernandez was seen in my clinic today for an injury. He is unable to lift, push or pull anything greater than 10 lbs for the next one week.      Sincerely,          Mae Jones, CNP

## 2019-02-11 NOTE — PATIENT INSTRUCTIONS
No heavy lifting.  Tylenol 1000 mg every 8 hours.  Oxycodone one tablet at bedtime if needed - no driving or alcohol with this medication.          Thank you for choosing [facility].  You may be receiving a survey in the mail from Diana Sevilla regarding your visit today.  Please take a few minutes to complete and return the survey to let us know how we are doing.      If you have questions or concerns, please contact us via [employer] or you can contact your care team at [phone].    Our Clinic hours are:  Monday 6:40 am  to 7:00 pm  Tuesday -Friday 6:40 am to 5:00 pm    The [place] outpatient lab hours are:  Monday - Friday 6:10 am to 4:45 pm  Saturdays 7:00 am to 11:00 am  Appointments are required, call [phone]    If you have clinical questions after hours or would like to schedule an appointment,  call the clinic at [phone].

## 2019-05-17 DIAGNOSIS — F51.01 PRIMARY INSOMNIA: ICD-10-CM

## 2019-05-17 RX ORDER — ZOLPIDEM TARTRATE 12.5 MG/1
12.5 TABLET, FILM COATED, EXTENDED RELEASE ORAL
Qty: 30 TABLET | Refills: 5 | Status: SHIPPED | OUTPATIENT
Start: 2019-06-17 | End: 2019-11-13

## 2019-05-17 NOTE — TELEPHONE ENCOUNTER
Requested Prescriptions   Pending Prescriptions Disp Refills     zolpidem ER (AMBIEN CR) 12.5 MG CR tablet [Pharmacy Med Name: ZOLPIDEM TARTRATE ER 12.5 MG TAB MPHASE] 30 tablet      Sig: TAKE ONE TABLET AT BEDTIME IF NEEDED FOR SLEEP       There is no refill protocol information for this order        Last Written Prescription Date:  12/18/18  Last Fill Quantity: 30,  # refills: 5   Last office visit: 2/11/2019 with prescribing provider:  Robert   Future Office Visit:

## 2019-05-17 NOTE — TELEPHONE ENCOUNTER
Routing refill request to provider for review/approval because:  Drug not on the [facility] refill protocol     Mckenzie ERICKSON RN

## 2019-06-26 ENCOUNTER — OFFICE VISIT (OUTPATIENT)
Dept: FAMILY MEDICINE | Facility: CLINIC | Age: 64
End: 2019-06-26
Payer: COMMERCIAL

## 2019-06-26 VITALS
HEIGHT: 66 IN | HEART RATE: 55 BPM | DIASTOLIC BLOOD PRESSURE: 64 MMHG | TEMPERATURE: 98 F | RESPIRATION RATE: 18 BRPM | OXYGEN SATURATION: 99 % | SYSTOLIC BLOOD PRESSURE: 104 MMHG | BODY MASS INDEX: 27.52 KG/M2 | WEIGHT: 171.25 LBS

## 2019-06-26 DIAGNOSIS — F51.01 PRIMARY INSOMNIA: ICD-10-CM

## 2019-06-26 DIAGNOSIS — I10 BENIGN ESSENTIAL HYPERTENSION: Primary | ICD-10-CM

## 2019-06-26 DIAGNOSIS — Z13.6 CARDIOVASCULAR SCREENING; LDL GOAL LESS THAN 130: ICD-10-CM

## 2019-06-26 LAB
ANION GAP SERPL CALCULATED.3IONS-SCNC: 3 MMOL/L (ref 3–14)
BUN SERPL-MCNC: 23 MG/DL (ref 7–30)
CALCIUM SERPL-MCNC: 9.3 MG/DL (ref 8.5–10.1)
CHLORIDE SERPL-SCNC: 107 MMOL/L (ref 94–109)
CHOLEST SERPL-MCNC: 203 MG/DL
CO2 SERPL-SCNC: 27 MMOL/L (ref 20–32)
CREAT SERPL-MCNC: 1.05 MG/DL (ref 0.66–1.25)
GFR SERPL CREATININE-BSD FRML MDRD: 75 ML/MIN/{1.73_M2}
GLUCOSE SERPL-MCNC: 103 MG/DL (ref 70–99)
HDLC SERPL-MCNC: 72 MG/DL
LDLC SERPL CALC-MCNC: 113 MG/DL
NONHDLC SERPL-MCNC: 131 MG/DL
POTASSIUM SERPL-SCNC: 5.1 MMOL/L (ref 3.4–5.3)
SODIUM SERPL-SCNC: 137 MMOL/L (ref 133–144)
TRIGL SERPL-MCNC: 89 MG/DL

## 2019-06-26 PROCEDURE — 36415 COLL VENOUS BLD VENIPUNCTURE: CPT | Performed by: NURSE PRACTITIONER

## 2019-06-26 PROCEDURE — 80061 LIPID PANEL: CPT | Performed by: NURSE PRACTITIONER

## 2019-06-26 PROCEDURE — 80048 BASIC METABOLIC PNL TOTAL CA: CPT | Performed by: NURSE PRACTITIONER

## 2019-06-26 PROCEDURE — 99214 OFFICE O/P EST MOD 30 MIN: CPT | Performed by: NURSE PRACTITIONER

## 2019-06-26 RX ORDER — METOPROLOL TARTRATE 50 MG
50 TABLET ORAL 2 TIMES DAILY
Qty: 180 TABLET | Refills: 3 | Status: SHIPPED | OUTPATIENT
Start: 2019-06-26 | End: 2020-04-29

## 2019-06-26 RX ORDER — LOSARTAN POTASSIUM 100 MG/1
100 TABLET ORAL DAILY
Qty: 90 TABLET | Refills: 3 | Status: SHIPPED | OUTPATIENT
Start: 2019-06-26 | End: 2020-04-29

## 2019-06-26 ASSESSMENT — MIFFLIN-ST. JEOR: SCORE: 1519.53

## 2019-06-26 NOTE — LETTER
June 26, 2019      Donnie Ernandez  [address] [place] [address]        Dear ,    We are writing to inform you of your test results.    Test results indicate you may require additional follow up, see comment below.    Resulted Orders   Basic metabolic panel   Result Value Ref Range    Sodium 137 133 - 144 mmol/L    Potassium 5.1 3.4 - 5.3 mmol/L    Chloride 107 94 - 109 mmol/L    Carbon Dioxide 27 20 - 32 mmol/L    Anion Gap 3 3 - 14 mmol/L    Glucose 103 (H) 70 - 99 mg/dL      Comment:      Fasting specimen    Urea Nitrogen 23 7 - 30 mg/dL    Creatinine 1.05 0.66 - 1.25 mg/dL    GFR Estimate 75 >60 mL/min/[1.73_m2]      Comment:      Non  GFR Calc  Starting 12/18/2018, serum creatinine based estimated GFR (eGFR) will be   calculated using the Chronic Kidney Disease Epidemiology Collaboration   (CKD-EPI) equation.      GFR Estimate If Black 87 >60 mL/min/[1.73_m2]      Comment:       GFR Calc  Starting 12/18/2018, serum creatinine based estimated GFR (eGFR) will be   calculated using the Chronic Kidney Disease Epidemiology Collaboration   (CKD-EPI) equation.      Calcium 9.3 8.5 - 10.1 mg/dL   Lipid panel reflex to direct LDL Fasting   Result Value Ref Range    Cholesterol 203 (H) <200 mg/dL      Comment:      Desirable:       <200 mg/dl    Triglycerides 89 <150 mg/dL      Comment:      Fasting specimen    HDL Cholesterol 72 >39 mg/dL    LDL Cholesterol Calculated 113 (H) <100 mg/dL      Comment:      Above desirable:  100-129 mg/dl  Borderline High:  130-159 mg/dL  High:             160-189 mg/dL  Very high:       >189 mg/dl      Non HDL Cholesterol 131 (H) <130 mg/dL      Comment:      Above Desirable:  130-159 mg/dl  Borderline high:  160-189 mg/dl  High:             190-219 mg/dl  Very high:       >219 mg/dl           Cholesterol is good.   Kidney function and electrolytes are normal.   Blood sugar is minimally elevated - work on limiting sugars.     If you have  any questions or concerns, please call the clinic at the number listed above.       Sincerely,        MAYANK Lutz CNP

## 2019-06-26 NOTE — PROGRESS NOTES
"Subjective     Donnie Ernandez is a 63 year old male who presents to clinic today for the following health issues:    HPI   Medication Followup of AMBIEN ER 12.5 MG    Taking Medication as prescribed: yes, 1 tablet every evening.    Side Effects:  None    Medication Helping Symptoms:  Yes    He is concerned that he is too dependent on the Ambien.    Thinking about stopping it and trying melatonin.    He has NILDA - couldn't tolerate the CPAP so doesn't use it.     Hypertension Follow-up      Do you check your blood pressure regularly outside of the clinic? No     Are you following a low salt diet? Yes    Are your blood pressures ever more than 140 on the top number (systolic) OR more   than 90 on the bottom number (diastolic), for example 140/90? No          Reviewed and updated as needed this visit by Provider  Tobacco  Allergies  Meds  Problems  Med Hx  Surg Hx  Fam Hx         Review of Systems   ROS COMP: Constitutional, HEENT, cardiovascular, pulmonary, gi and gu systems are negative, except as otherwise noted.      Objective    /64   Pulse 55   Temp 98  F (36.7  C) (Tympanic)   Resp 18   Ht 1.676 m (5' 6\")   Wt 77.7 kg (171 lb 4 oz)   SpO2 99%   BMI 27.64 kg/m    Body mass index is 27.64 kg/m .  Physical Exam   GENERAL: healthy, alert and no distress  RESP: lungs clear to auscultation - no rales, rhonchi or wheezes  CV: regular rate and rhythm, normal S1 S2, no S3 or S4, no murmur, click or rub, no peripheral edema and peripheral pulses strong  PSYCH: mentation appears normal, affect normal/bright            Assessment & Plan       ICD-10-CM    1. Benign essential hypertension I10 Well controlled.  losartan (COZAAR) 100 MG tablet     metoprolol tartrate (LOPRESSOR) 50 MG tablet     Basic metabolic panel     2. Primary insomnia F51.01 Has been using Ambien for years - he wants to try to go off of it.  Advised that he may stop it and try the melatonin over-the-counter.  Discussed that treatment " of NILDA would likely help sleep quality as well.     3. CARDIOVASCULAR SCREENING; LDL GOAL LESS THAN 130 Z13.6 Lipid panel reflex to direct LDL Fasting              Return in about 6 months (around 12/26/2019) for Medication Check.    MAYANK Lutz [facility]

## 2019-07-22 ENCOUNTER — NURSE TRIAGE (OUTPATIENT)
Dept: FAMILY MEDICINE | Facility: CLINIC | Age: 64
End: 2019-07-22

## 2019-07-22 NOTE — TELEPHONE ENCOUNTER
Reason for call:  Patient reporting a symptom    Symptom or request: Patient was spraying round  Up in yard. Since then he has been coughing, coughing up fleam, nose runny, Patient said he can smell it. Patient did the round up yesterday afternoon.    Duration (how long have symptoms been present): noticed it last night this morning this was worse.    Have you been treated for this before? No    Phone Number patient can be reached at:  [phone]    Best Time:  any    Can we leave a detailed message on this number:  YES    Call taken on 7/22/2019 at 8:26 AM by Jennifer Crouch

## 2019-07-22 NOTE — TELEPHONE ENCOUNTER
Additional Information    Negative: Bluish (or gray) lips or face    Negative: Severe difficulty breathing (e.g., struggling for each breath, speaks in single words)    Negative: Rapid onset of cough and has hives    Negative: Coughing started suddenly after medicine, an allergic food or bee sting    Negative: Difficulty breathing after exposure to flames, smoke, or fumes    Negative: Sounds like a life-threatening emergency to the triager    Negative: Chest pain present when not coughing    Negative: Difficulty breathing    Negative: Passed out (i.e., fainted, collapsed and was not responding)    Negative: Coughed up > 1 tablespoon (15 ml) blood (Exception: blood-tinged sputum)    Negative: Fever > 103 F (39.4 C)    Negative: Fever > 101 F (38.3 C) and over 60 years of age    Negative: Fever > 100.0 F (37.8 C) and has diabetes mellitus or a weak immune system (e.g., HIV positive, cancer chemotherapy, organ transplant, splenectomy, chronic steroids)    Negative: Fever > 100.0 F (37.8 C) and bedridden (e.g., nursing home patient, stroke, chronic illness, recovering from surgery)    Negative: Increasing ankle swelling    Negative: Wheezing is present    Negative: SEVERE coughing spells (e.g., whooping sound after coughing, vomiting after coughing)    Negative: Coughing up bill-colored (reddish-brown) or blood-tinged sputum    Negative: Fever present > 3 days (72 hours)    Negative: Fever returns after gone for over 24 hours and symptoms worse or not improved    Negative: Using nasal washes and pain medicine > 24 hours and sinus pain persists    Negative: Known COPD or other severe lung disease (i.e., bronchiectasis, cystic fibrosis, lung surgery) and worsening symptoms (i.e., increased sputum purulence or amount, increased breathing difficulty)    Protocols used: COUGH-A-OH

## 2019-10-29 ENCOUNTER — HOSPITAL ENCOUNTER (EMERGENCY)
Facility: CLINIC | Age: 64
Discharge: HOME | End: 2019-10-29
Attending: NURSE PRACTITIONER | Admitting: NURSE PRACTITIONER
Payer: COMMERCIAL

## 2019-10-29 VITALS
OXYGEN SATURATION: 98 % | BODY MASS INDEX: 28.12 KG/M2 | HEIGHT: 66 IN | SYSTOLIC BLOOD PRESSURE: 167 MMHG | WEIGHT: 175 LBS | DIASTOLIC BLOOD PRESSURE: 80 MMHG | RESPIRATION RATE: 16 BRPM | TEMPERATURE: 98.5 F

## 2019-10-29 DIAGNOSIS — J01.90 ACUTE SINUSITIS WITH SYMPTOMS > 10 DAYS: ICD-10-CM

## 2019-10-29 LAB
INTERNAL QC OK POCT: YES
S PYO AG THROAT QL IA.RAPID: NEGATIVE

## 2019-10-29 PROCEDURE — 87880 STREP A ASSAY W/OPTIC: CPT | Performed by: NURSE PRACTITIONER

## 2019-10-29 PROCEDURE — G0463 HOSPITAL OUTPT CLINIC VISIT: HCPCS

## 2019-10-29 PROCEDURE — 87081 CULTURE SCREEN ONLY: CPT | Performed by: OTOLARYNGOLOGY

## 2019-10-29 PROCEDURE — 99213 OFFICE O/P EST LOW 20 MIN: CPT | Mod: Z6 | Performed by: NURSE PRACTITIONER

## 2019-10-29 RX ORDER — AZITHROMYCIN 250 MG/1
TABLET, FILM COATED ORAL
Qty: 6 TABLET | Refills: 0 | Status: SHIPPED | OUTPATIENT
Start: 2019-10-29 | End: 2019-10-31

## 2019-10-29 ASSESSMENT — ENCOUNTER SYMPTOMS
COUGH: 0
FEVER: 0
DIZZINESS: 0
LIGHT-HEADEDNESS: 0
VOMITING: 0
HEADACHES: 1
SORE THROAT: 1
SINUS PRESSURE: 1
CHILLS: 1

## 2019-10-29 ASSESSMENT — MIFFLIN-ST. JEOR: SCORE: 1531.54

## 2019-10-29 NOTE — LETTER
October 29, 2019      To Whom It May Concern:      Donnie Ernandez was seen in our Urgent Care today, 10/29/19. Please excuse from work 10/28-10/29/2019 for this illness.    Sincerely,        MAYANK Amato CNP
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
## 2019-10-29 NOTE — ED AVS SNAPSHOT
[facility]  [address] [address]  Phone:  [phone]  Fax:  [phone]                                    Donnie Ernandez   MRN: [id number]    Department:  [facility]   Date of Visit:  10/29/2019           After Visit Summary Signature Page    I have received my discharge instructions, and my questions have been answered. I have discussed any challenges I see with this plan with the nurse or doctor.    ..........................................................................................................................................  Patient/Patient Representative Signature      ..........................................................................................................................................  Patient Representative Print Name and Relationship to Patient    ..................................................               ................................................  Date                                   Time    ..........................................................................................................................................  Reviewed by Signature/Title    ...................................................              ..............................................  Date                                               Time          22EPIC Rev 08/18

## 2019-10-30 NOTE — RESULT ENCOUNTER NOTE
Preliminary Beta strep group A r/o culture is PENDING and/or NEGATIVE at this time.   No changes in treatment per [facility] Strep protocol.

## 2019-10-31 ENCOUNTER — OFFICE VISIT (OUTPATIENT)
Dept: FAMILY MEDICINE | Facility: CLINIC | Age: 64
End: 2019-10-31
Payer: COMMERCIAL

## 2019-10-31 VITALS
BODY MASS INDEX: 28.57 KG/M2 | OXYGEN SATURATION: 98 % | RESPIRATION RATE: 12 BRPM | HEART RATE: 58 BPM | DIASTOLIC BLOOD PRESSURE: 62 MMHG | SYSTOLIC BLOOD PRESSURE: 126 MMHG | WEIGHT: 177 LBS | TEMPERATURE: 98.5 F

## 2019-10-31 DIAGNOSIS — J06.9 VIRAL URI: Primary | ICD-10-CM

## 2019-10-31 DIAGNOSIS — T36.95XA ANTIBIOTIC-ASSOCIATED DIARRHEA: ICD-10-CM

## 2019-10-31 DIAGNOSIS — K52.1 ANTIBIOTIC-ASSOCIATED DIARRHEA: ICD-10-CM

## 2019-10-31 PROCEDURE — 99214 OFFICE O/P EST MOD 30 MIN: CPT | Performed by: INTERNAL MEDICINE

## 2019-10-31 NOTE — PATIENT INSTRUCTIONS
1. Stop the zpak since it is causing diarrhea  2. You are at risk for C-diff - a type of diarrhea infection associated with antibiotic use.  The symptoms are abdominal pain/cramping and liquid diarrhea.  It is a bit early for this to happen after 2 doses of antibiotic.  However, if the diarrhea does not improve 2-4 days after stopping the antibiotic, then return to clinic and we should test you for this.    You have a cold, which is a virus.  Antibiotics treat bacterial infections and not viral infections.  They will not cure or shorten a cold.  The main way to feel better is rest, hydration, good nutrition.  You can try some of the following over the counter medicines:    --For cough - try Robitussin DM or Mucinex DM (Acitve ingredients Guaifenesin and dextromethorphan)  --For nasal congestion, try saline nasal spray (Ocean brand or similar.  NOT Afrin)  --For sore throat and cough, try Chloraseptic spray, cough drops, warm salt water gargles or tea with honey.    --Use a humidifier at night  --For body aches and pains and fever, try acetaminophen or ibuprofen

## 2019-10-31 NOTE — PROGRESS NOTES
Subjective     Donnie Ernandez is a 64 year old male who presents to clinic today for the following health issues:    HPI   ED/UC Followup:    Facility:  [facility]  Date of visit: 10/29/19  Reason for visit: Acute Sinus with Symptoms (Pharyngitis)  Current Status: PT verbalize that the medication was provided in the ED is hurting his stomach - pt would like to see if he could take something else.  --the antibiotic zpak is causing abdominal pain diffuse and diarrhea. Having 4-8 stools day, sometimes liquid.  --the URI symptoms have not improved  --he is hoping for 'an antibiotic shot'       Acute Illness   Onset: Follow up E.D.    Fever: no    Chills/Sweats: YES- Chills    Headache (location?): YES    Sinus Pressure:YES    Conjunctivitis:  no    Ear Pain: YES: both    Rhinorrhea: YES    Congestion: YES    Sore Throat: no     Cough: YES    Wheeze: no    Decreased Appetite: no    Nausea: no    Vomiting: no    Diarrhea:  YES- not sure if is related from the antibiotic    Dysuria/Freq.: no    Fatigue/Achiness: no    Sick/Strep Exposure: no     Therapies Tried and outcome: na          Current Outpatient Medications   Medication Sig Dispense Refill     Acetaminophen (TYLENOL EXTRA STRENGTH PO) Take 1,000 mg by mouth daily as needed        azithromycin (ZITHROMAX Z-TIFFANY) 250 MG tablet Two tablets on the first day, then one tablet daily for the next 4 days 6 tablet 0     fluticasone (FLONASE) 50 MCG/ACT spray SPRAY 1 SPRAY IN EACH NOSTRIL TWO TIMES A DAY 16 g 11     losartan (COZAAR) 100 MG tablet Take 1 tablet (100 mg) by mouth daily 90 tablet 3     metoprolol tartrate (LOPRESSOR) 50 MG tablet Take 1 tablet (50 mg) by mouth 2 times daily 180 tablet 3     zolpidem ER (AMBIEN CR) 12.5 MG CR tablet Take 1 tablet (12.5 mg) by mouth nightly as needed for sleep 30 tablet 5         Reviewed and updated as needed this visit by Provider         Review of Systems   ROS COMP: Constitutional, HEENT, cardiovascular, pulmonary,  gi and gu systems are negative, except as otherwise noted.      Objective    /62   Pulse 58   Temp 98.5  F (36.9  C) (Tympanic)   Resp 12   Wt 80.3 kg (177 lb)   SpO2 98%   BMI 28.57 kg/m    Body mass index is 28.57 kg/m .  Physical Exam   GENERAL APPEARANCE: healthy, alert and no distress  EYES: Eyes grossly normal to inspection, PERRL and conjunctivae and sclerae normal  HENT: ear canals and TM's normal, nose and mouth without ulcers or lesions and maxillary sinus tenderness bilateral  NECK: no adenopathy, no asymmetry, masses, or scars and thyroid normal to palpation  RESP: lungs clear to auscultation - no rales, rhonchi or wheezes  CV: regular rates and rhythm, normal S1 S2, no S3 or S4 and no murmur, click or rub  ABDOMEN: soft, nontender, without hepatosplenomegaly or masses and bowel sounds normal          Assessment & Plan     1. Viral URI - discussed over the counter treatment.  Symptoms and time course are most consistent with a viral process.  Given that he has not had any improvement in his your symptoms with 2 days of antibiotics and is having severe reaction to the antibiotic, further antibiotics is not likely to be helpful.    2. Antibiotic-associated diarrhea -versus C. difficile.  It would be early to have developed C. difficile after 2 days of antibiotics.  Advised to stop the antibiotic and monitor for symptoms.  If the GI symptoms persist, follow-up in clinic and consider testing for C. difficile             Patient Instructions   1. Stop the zpak since it is causing diarrhea  2. You are at risk for C-diff - a type of diarrhea infection associated with antibiotic use.  The symptoms are abdominal pain/cramping and liquid diarrhea.  It is a bit early for this to happen after 2 doses of antibiotic.  However, if the diarrhea does not improve 2-4 days after stopping the antibiotic, then return to clinic and we should test you for this.    You have a cold, which is a virus.  Antibiotics  treat bacterial infections and not viral infections.  They will not cure or shorten a cold.  The main way to feel better is rest, hydration, good nutrition.  You can try some of the following over the counter medicines:    --For cough - try Robitussin DM or Mucinex DM (Acitve ingredients Guaifenesin and dextromethorphan)  --For nasal congestion, try saline nasal spray (Ocean brand or similar.  NOT Afrin)  --For sore throat and cough, try Chloraseptic spray, cough drops, warm salt water gargles or tea with honey.    --Use a humidifier at night  --For body aches and pains and fever, try acetaminophen or ibuprofen            Return in about 1 week (around 11/7/2019) for If symptoms don't improve or if they worsen.     Greater than 25 minutes was spent face-to-face with the patient by the provider.  Greater than 50% was spent in counseling or coordinating care for this patient.      Jacquelyn Manzanares,   [facility]

## 2019-10-31 NOTE — LETTER
October 31, 2019      Donnie Ernandez  [address] [place] [id number]        To Whom It May Concern:    Donnie Ernandez was seen in our clinic.  He should stay home from work 10/31/2019 due to acute illness.        Sincerely,        Jacquelyn Manzanares, DO

## 2019-11-01 LAB
BACTERIA SPEC CULT: NORMAL
Lab: NORMAL
SPECIMEN SOURCE: NORMAL

## 2019-11-01 NOTE — RESULT ENCOUNTER NOTE
Final Beta strep group A r/o culture is NEGATIVE for Group A streptococcus.    No treatment or change in treatment per [facility] Strep protocol.

## 2019-11-03 DIAGNOSIS — F51.01 PRIMARY INSOMNIA: ICD-10-CM

## 2019-11-04 NOTE — TELEPHONE ENCOUNTER
Requested Prescriptions   Pending Prescriptions Disp Refills     zolpidem ER (AMBIEN CR) 12.5 MG CR tablet [Pharmacy Med Name: ZOLPIDEM TARTRATE ER 12.5 MG TAB MPHASE] 30 tablet      Sig: TAKE ONE TABLET AT BEDTIME IF NEEDED FOR SLEEP   Last Written Prescription Date:  6/17/19  Last Fill Quantity: 30 tab,  # refills: 5   Last office visit: 10/31/2019 with prescribing provider:  ANNA Manzanares   Future Office Visit:        There is no refill protocol information for this order

## 2019-11-05 RX ORDER — ZOLPIDEM TARTRATE 12.5 MG/1
TABLET, FILM COATED, EXTENDED RELEASE ORAL
Qty: 30 TABLET | OUTPATIENT
Start: 2019-11-05

## 2019-11-13 ENCOUNTER — OFFICE VISIT (OUTPATIENT)
Dept: FAMILY MEDICINE | Facility: CLINIC | Age: 64
End: 2019-11-13
Payer: COMMERCIAL

## 2019-11-13 VITALS
OXYGEN SATURATION: 98 % | HEART RATE: 62 BPM | TEMPERATURE: 98.5 F | SYSTOLIC BLOOD PRESSURE: 124 MMHG | DIASTOLIC BLOOD PRESSURE: 68 MMHG | HEIGHT: 66 IN | BODY MASS INDEX: 27.8 KG/M2 | WEIGHT: 173 LBS

## 2019-11-13 DIAGNOSIS — F51.01 PRIMARY INSOMNIA: Primary | ICD-10-CM

## 2019-11-13 DIAGNOSIS — J06.9 VIRAL URI: ICD-10-CM

## 2019-11-13 PROCEDURE — 99214 OFFICE O/P EST MOD 30 MIN: CPT | Performed by: NURSE PRACTITIONER

## 2019-11-13 RX ORDER — ZOLPIDEM TARTRATE 12.5 MG/1
12.5 TABLET, FILM COATED, EXTENDED RELEASE ORAL
Qty: 30 TABLET | Refills: 5 | Status: SHIPPED | OUTPATIENT
Start: 2019-11-13 | End: 2020-04-29

## 2019-11-13 ASSESSMENT — MIFFLIN-ST. JEOR: SCORE: 1522.47

## 2019-11-13 NOTE — PATIENT INSTRUCTIONS
Thank you for choosing [facility].  You may be receiving an email and/or telephone survey request from [facility] regarding your visit today.  Please take a few minutes to respond to the survey to let us know how we are doing.      If you have questions or concerns, please contact us via [employer] or you can contact your care team at [phone].    Our Clinic hours are:  Monday 6:40 am  to 7:00 pm  Tuesday -Friday 6:40 am to 5:00 pm    The [place] outpatient lab hours are:  Monday - Friday 6:10 am to 4:45 pm  Saturdays 7:00 am to 11:00 am  Appointments are required, call [phone]    If you have clinical questions after hours or would like to schedule an appointment,  call the clinic at [phone].

## 2019-11-13 NOTE — PROGRESS NOTES
"Subjective     Donnie Ernandez is a 64 year old male who presents to clinic today for the following health issues:    HPI   ED/UC Followup:    Facility:  [facility]  Date of visit: 10/29/2019  Reason for visit: sinus pain  Current Status: patient states he is feeling better, not like it was  Still has nasal congestion  they gave him zithromax and he couldn't finish the whole course due to stomach upset       Medication Followup of ambien    Taking Medication as prescribed: yes    Side Effects:  None    Medication Helping Symptoms:  yes               Reviewed and updated as needed this visit by Provider  Tobacco  Allergies  Meds  Problems  Med Hx  Surg Hx  Fam Hx         Review of Systems   ROS COMP: Constitutional, HEENT, cardiovascular, pulmonary, gi and gu systems are negative, except as otherwise noted.      Objective    /68 (BP Location: Right arm, Cuff Size: Adult Regular)   Pulse 62   Temp 98.5  F (36.9  C) (Tympanic)   Ht 1.676 m (5' 6\")   Wt 78.5 kg (173 lb)   SpO2 98%   BMI 27.92 kg/m    Body mass index is 27.92 kg/m .  Physical Exam   GENERAL: healthy, alert and no distress  NECK: no adenopathy, no asymmetry, masses, or scars and thyroid normal to palpation  RESP: lungs clear to auscultation - no rales, rhonchi or wheezes  CV: regular rate and rhythm, normal S1 S2, no S3 or S4, no murmur, click or rub, no peripheral edema and peripheral pulses strong  MS: no gross musculoskeletal defects noted, no edema            Assessment & Plan       ICD-10-CM    1. Primary insomnia F51.01 zolpidem ER (AMBIEN CR) 12.5 MG CR tablet - refilled for 6 months.     2. Viral URI J06.9 Further antibiotics not indicated.  Discussed symptomatic cares.          Return in about 6 months (around 5/13/2020) for Medication Check.    The risks, benefits and treatment options of prescribed medications or other treatments have been discussed with the patient. The patient verbalized their understanding and should " call or follow up if no improvement or if they develop further problems.    MAYANK Lutz [facility]

## 2019-12-12 ENCOUNTER — OFFICE VISIT (OUTPATIENT)
Dept: FAMILY MEDICINE | Facility: CLINIC | Age: 64
End: 2019-12-12
Payer: COMMERCIAL

## 2019-12-12 ENCOUNTER — ANCILLARY PROCEDURE (OUTPATIENT)
Dept: GENERAL RADIOLOGY | Facility: CLINIC | Age: 64
End: 2019-12-12
Attending: NURSE PRACTITIONER
Payer: COMMERCIAL

## 2019-12-12 VITALS
DIASTOLIC BLOOD PRESSURE: 68 MMHG | TEMPERATURE: 98.2 F | RESPIRATION RATE: 14 BRPM | WEIGHT: 169.6 LBS | OXYGEN SATURATION: 97 % | SYSTOLIC BLOOD PRESSURE: 138 MMHG | BODY MASS INDEX: 27.37 KG/M2 | HEART RATE: 80 BPM

## 2019-12-12 DIAGNOSIS — R06.2 WHEEZING: ICD-10-CM

## 2019-12-12 DIAGNOSIS — R05.9 COUGH: ICD-10-CM

## 2019-12-12 DIAGNOSIS — K52.9 CHRONIC DIARRHEA: ICD-10-CM

## 2019-12-12 DIAGNOSIS — R52 BODY ACHES: Primary | ICD-10-CM

## 2019-12-12 DIAGNOSIS — I10 ESSENTIAL HYPERTENSION, BENIGN: ICD-10-CM

## 2019-12-12 LAB
FLUAV+FLUBV AG SPEC QL: NEGATIVE
FLUAV+FLUBV AG SPEC QL: NEGATIVE
SPECIMEN SOURCE: NORMAL

## 2019-12-12 PROCEDURE — 99214 OFFICE O/P EST MOD 30 MIN: CPT | Performed by: NURSE PRACTITIONER

## 2019-12-12 PROCEDURE — 87804 INFLUENZA ASSAY W/OPTIC: CPT | Performed by: NURSE PRACTITIONER

## 2019-12-12 PROCEDURE — 71046 X-RAY EXAM CHEST 2 VIEWS: CPT

## 2019-12-12 RX ORDER — BENZONATATE 200 MG/1
200 CAPSULE ORAL 3 TIMES DAILY PRN
Qty: 21 CAPSULE | Refills: 0 | Status: SHIPPED | OUTPATIENT
Start: 2019-12-12 | End: 2020-04-29

## 2019-12-12 RX ORDER — ALBUTEROL SULFATE 90 UG/1
2 AEROSOL, METERED RESPIRATORY (INHALATION) EVERY 4 HOURS PRN
Qty: 1 INHALER | Refills: 0 | Status: SHIPPED | OUTPATIENT
Start: 2019-12-12 | End: 2021-10-12

## 2019-12-12 RX ORDER — CODEINE PHOSPHATE AND GUAIFENESIN 10; 100 MG/5ML; MG/5ML
1-2 SOLUTION ORAL EVERY 4 HOURS PRN
Qty: 180 ML | Refills: 0 | Status: SHIPPED | OUTPATIENT
Start: 2019-12-12 | End: 2020-04-29

## 2019-12-12 NOTE — PATIENT INSTRUCTIONS
1. Coricidin is safe to use with high blood pressure.   2. Chest x-ray today          Thank you for choosing [facility].  You may be receiving an email and/or telephone survey request from [facility] Customer Experience regarding your visit today.  Please take a few minutes to respond to the survey to let us know how we are doing.      If you have questions or concerns, please contact us via [email or web address] or you can contact your care team at [phone].    Our Clinic hours are:  Monday 6:40 am  to 7:00 pm  Tuesday -Friday 6:40 am to 5:00 pm    The [place] outpatient lab hours are:  Monday - Friday 6:10 am to 4:45 pm  Saturdays 7:00 am to 11:00 am  Appointments are required, call [phone]    If you have clinical questions after hours or would like to schedule an appointment,  call the clinic at [phone].

## 2019-12-12 NOTE — LETTER
[facility]  [address] [address]  Phone: [phone]    December 12, 2019        Donnie Ernandez  [address] [facility] [address]          To whom it may concern:    RE: Donnie Ernandez    Patient was seen and treated today at our clinic and missed work.    Please contact me for questions or concerns.      Sincerely,        MAYANK Eubanks CNP
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
## 2020-04-27 ENCOUNTER — TELEPHONE (OUTPATIENT)
Dept: FAMILY MEDICINE | Facility: CLINIC | Age: 65
End: 2020-04-27

## 2020-04-27 DIAGNOSIS — F51.01 PRIMARY INSOMNIA: ICD-10-CM

## 2020-04-27 RX ORDER — ZOLPIDEM TARTRATE 12.5 MG/1
12.5 TABLET, FILM COATED, EXTENDED RELEASE ORAL
Qty: 30 TABLET | Refills: 5 | OUTPATIENT
Start: 2020-04-27

## 2020-04-27 NOTE — TELEPHONE ENCOUNTER
Patient needs an appointment - you can schedule him for a phone visit - I have availability today.  Mae Jones, CNP

## 2020-04-27 NOTE — TELEPHONE ENCOUNTER
PATIENT:   Subjective   Patient ID: Alex is a 51 year old female.    CHIEF COMPLAINT:  Chief Complaint   Patient presents with   • Diabetes     Follow up on diabetes and blood work       HPI:  This is a 51 years old female patient came in for recheck on her hypertension hyperlipidemia and diabetes.  Patient's weight has been stable.  Patient states she is working 12 hours a day and she is not doing any exercises.  Her blood pressure when she came in was 160/72 after rest came down to 140/72.  No headache no dizziness patient is due for an eye exam and colonoscopy.  Patient had a mammogram in January which is normal.  Patient is being treated for hypertension hyperlipidemia and diabetes      ROS:  Review of Systems   Constitutional: Negative.    HENT: Negative.    Eyes: Negative.    Respiratory: Negative.    Cardiovascular: Negative.    Gastrointestinal: Negative.         Bowel movements are normal patient is due for a colonoscopy   Endocrine:        History of diabetes   Genitourinary:        Patient is premenopausal complains of occasional spotting last time she had some spotting was in April patient advised to follow-up with GYN for Pap smear   Musculoskeletal: Negative.    Skin: Negative.    Neurological: Negative.    Psychiatric/Behavioral: Negative.    All other systems reviewed and are negative.      PROBLEM LIST:  There is no problem list on file for this patient.       MEDICAL HISTORY:  Past Medical History:   Diagnosis Date   • Diabetes mellitus (CMS/HCC)    • Essential (primary) hypertension    • High cholesterol         SURGICAL HISTORY:   has no past surgical history on file.     FAMILY MEDICAL HISTORY:  family history is not on file.    SOCIAL HISTORY:  Social History     Tobacco Use   • Smoking status: Never Smoker   • Smokeless tobacco: Never Used   Substance Use Topics   • Alcohol use: Yes   • Drug use: Not on file       MEDICATIONS:  Current Outpatient Medications   Medication Sig Dispense Refill  Requested Prescriptions   Pending Prescriptions Disp Refills     zolpidem ER (AMBIEN CR) 12.5 MG CR tablet 30 tablet 5     Sig: Take 1 tablet (12.5 mg) by mouth nightly as needed for sleep       There is no refill protocol information for this order        Last Written Prescription Date:  11/13/19  Last Fill Quantity: 30,  # refills: 5   Last Office Visit with G, [facility] or [facility] prescribing provider:  12/12/19   Future Office Visit:          • losartan-hydrochlorothiazide (HYZAAR) 50-12.5 MG per tablet Take 1 tablet by mouth daily 30 tablet 0   • atorvastatin (LIPITOR) 40 MG tablet TAKE ONE TABLET BY MOUTH ONCE DAILY 30 tablet 0   • metformin (GLUCOPHAGE) 1000 MG tablet Take 1 tablet by mouth 2 times daily 60 tablet 1   • cromolyn (OPTICROM) 4 % ophthalmic solution        No current facility-administered medications for this visit.        ALLERGIES:  is allergic to penicillins.    VITAL SIGNS THIS VISIT:  Blood pressure 160/72, pulse 85, temperature 97.5 °F (36.4 °C), temperature source Tympanic, resp. rate 18, height 5' 4\" (1.626 m), weight 98.4 kg (217 lb), SpO2 100 %.     PHYSICAL EXAM:  Objective    Physical Exam   Constitutional: She appears well-developed and well-nourished.   Comfortable alert oriented history of diabetes hypertension hyperlipidemia   HENT:   Head: Normocephalic and atraumatic.   Eyes: Pupils are equal, round, and reactive to light. Conjunctivae are normal.   Neck: Normal range of motion. Neck supple.   Cardiovascular: Normal rate and regular rhythm.   Pulmonary/Chest: Effort normal and breath sounds normal.   Abdominal: Soft.   Patient is advised to have a colonoscopy   Genitourinary:   Genitourinary Comments: Premenopausal history of anemia patient to follow-up with gynecologist   Musculoskeletal: Normal range of motion.   Neurological: She is alert.   Skin: Skin is warm.   Nursing note and vitals reviewed.      IMMUNIZATIONS:  Immunization History   Administered Date(s) Administered   • Influenza, injectable, quadrivalent, preservative-free 01/24/2018   • Influenza, seasonal, injectable, preservative free 12/10/2013        ORDERS:  No orders of the defined types were placed in this encounter.        ASSESSMENT:  Problem List Items Addressed This Visit     None      General physical exam  Hypertension  Diabetes  Hyperlipidemia    PLAN:  Complete blood work and colonoscopy has been ordered  Patient to follow-up with  gynecologist  Refill on the medicines were given  Patient advised to lose weight and exercise  Follow-up with us as needed or in 2 months  Refill on the medicines were given

## 2020-04-28 ENCOUNTER — TELEPHONE (OUTPATIENT)
Dept: NURSING | Facility: CLINIC | Age: 65
End: 2020-04-28

## 2020-04-28 NOTE — TELEPHONE ENCOUNTER
Returning call from clinic. I related the message from Mae Jones that Donnie needs an appointment (telephone) re: refill of sleeping med. Able to schedule for a telephone visit tomorrow morning.  Marisa Nails RN  [facility]

## 2020-04-29 ENCOUNTER — VIRTUAL VISIT (OUTPATIENT)
Dept: FAMILY MEDICINE | Facility: CLINIC | Age: 65
End: 2020-04-29
Payer: COMMERCIAL

## 2020-04-29 DIAGNOSIS — I10 BENIGN ESSENTIAL HYPERTENSION: ICD-10-CM

## 2020-04-29 DIAGNOSIS — F51.01 PRIMARY INSOMNIA: ICD-10-CM

## 2020-04-29 DIAGNOSIS — Z13.6 CARDIOVASCULAR SCREENING; LDL GOAL LESS THAN 130: Primary | ICD-10-CM

## 2020-04-29 PROCEDURE — 99213 OFFICE O/P EST LOW 20 MIN: CPT | Mod: 95 | Performed by: NURSE PRACTITIONER

## 2020-04-29 RX ORDER — ZOLPIDEM TARTRATE 12.5 MG/1
12.5 TABLET, FILM COATED, EXTENDED RELEASE ORAL
Qty: 30 TABLET | Refills: 5 | Status: SHIPPED | OUTPATIENT
Start: 2020-04-29 | End: 2020-10-07

## 2020-04-29 RX ORDER — METOPROLOL TARTRATE 50 MG
50 TABLET ORAL 2 TIMES DAILY
Qty: 180 TABLET | Refills: 3 | Status: SHIPPED | OUTPATIENT
Start: 2020-04-29 | End: 2021-07-02

## 2020-04-29 RX ORDER — LOSARTAN POTASSIUM 100 MG/1
100 TABLET ORAL DAILY
Qty: 90 TABLET | Refills: 3 | Status: SHIPPED | OUTPATIENT
Start: 2020-04-29 | End: 2021-03-29

## 2020-04-29 NOTE — PROGRESS NOTES
"Donnie Ernandez is a 64 year old male who is being evaluated via a billable telephone visit.      The patient has been notified of following:     \"This telephone visit will be conducted via a call between you and your physician/provider. We have found that certain health care needs can be provided without the need for a physical exam.  This service lets us provide the care you need with a short phone conversation.  If a prescription is necessary we can send it directly to your pharmacy.  If lab work is needed we can place an order for that and you can then stop by our lab to have the test done at a later time.    Telephone visits are billed at different rates depending on your insurance coverage. During this emergency period, for some insurers they may be billed the same as an in-person visit.  Please reach out to your insurance provider with any questions.    If during the course of the call the physician/provider feels a telephone visit is not appropriate, you will not be charged for this service.\"    Patient has given verbal consent for Telephone visit?  Yes    How would you like to obtain your AVS? Mail a copy    Subjective     Donnie Ernandez is a 64 year old male who presents to clinic today for the following health issues:    HPI  Medication Followup of ambien    Taking Medication as prescribed: yes    Side Effects:  None    Medication Helping Symptoms:  yes          Hypertension Follow-up      Do you check your blood pressure regularly outside of the clinic? No     Are you following a low salt diet? Yes    Are your blood pressures ever more than 140 on the top number (systolic) OR more   than 90 on the bottom number (diastolic), for example 140/90? No          Reviewed and updated as needed this visit by Provider  Tobacco  Allergies  Meds  Problems  Med Hx  Surg Hx  Fam Hx         Review of Systems   ROS COMP: Constitutional, HEENT, cardiovascular, pulmonary, gi and gu systems are negative, except as " otherwise noted.       Objective   Reported vitals:  There were no vitals taken for this visit.   PSYCH: Alert and oriented times 3; coherent speech, normal   rate and volume, able to articulate logical thoughts, able   to abstract reason, no tangential thoughts, no hallucinations   or delusions  RESP: No cough, no audible wheezing, able to talk in full sentences  Remainder of exam unable to be completed due to telephone visits            Assessment/Plan:  1. Primary insomnia  Well controlled  - zolpidem ER (AMBIEN CR) 12.5 MG CR tablet; Take 1 tablet (12.5 mg) by mouth nightly as needed for sleep  Dispense: 30 tablet; Refill: 5    2. Benign essential hypertension  Well controlled  - losartan (COZAAR) 100 MG tablet; Take 1 tablet (100 mg) by mouth daily  Dispense: 90 tablet; Refill: 3  - metoprolol tartrate (LOPRESSOR) 50 MG tablet; Take 1 tablet (50 mg) by mouth 2 times daily  Dispense: 180 tablet; Refill: 3  - **Basic metabolic panel FUTURE anytime; Future    3. CARDIOVASCULAR SCREENING; LDL GOAL LESS THAN 130  He will return to lab in the next few months for fasting labs  - Lipid panel reflex to direct LDL Fasting; Future    Return in about 6 months (around 10/29/2020) for Medication Check.      Phone call duration:  5 minutes    The risks, benefits and treatment options of prescribed medications or other treatments have been discussed with the patient. The patient verbalized their understanding and should call or follow up if no improvement or if they develop further problems.    MAYANK Lutz CNP

## 2020-05-05 ENCOUNTER — VIRTUAL VISIT (OUTPATIENT)
Dept: FAMILY MEDICINE | Facility: CLINIC | Age: 65
End: 2020-05-05
Payer: COMMERCIAL

## 2020-05-05 DIAGNOSIS — J01.00 ACUTE NON-RECURRENT MAXILLARY SINUSITIS: Primary | ICD-10-CM

## 2020-05-05 PROCEDURE — 99213 OFFICE O/P EST LOW 20 MIN: CPT | Mod: TEL | Performed by: NURSE PRACTITIONER

## 2020-05-05 RX ORDER — DOXYCYCLINE HYCLATE 100 MG
100 TABLET ORAL 2 TIMES DAILY
Qty: 20 TABLET | Refills: 0 | Status: SHIPPED | OUTPATIENT
Start: 2020-05-05 | End: 2020-05-05

## 2020-05-05 RX ORDER — DOXYCYCLINE HYCLATE 100 MG
100 TABLET ORAL 2 TIMES DAILY
Qty: 20 TABLET | Refills: 0 | Status: SHIPPED | OUTPATIENT
Start: 2020-05-05 | End: 2020-10-14

## 2020-05-05 NOTE — PROGRESS NOTES
"Donnie Ernandez is a 64 year old male who is being evaluated via a billable telephone visit.      The patient has been notified of following:     \"This telephone visit will be conducted via a call between you and your physician/provider. We have found that certain health care needs can be provided without the need for a physical exam.  This service lets us provide the care you need with a short phone conversation.  If a prescription is necessary we can send it directly to your pharmacy.  If lab work is needed we can place an order for that and you can then stop by our lab to have the test done at a later time.    Telephone visits are billed at different rates depending on your insurance coverage. During this emergency period, for some insurers they may be billed the same as an in-person visit.  Please reach out to your insurance provider with any questions.    If during the course of the call the physician/provider feels a telephone visit is not appropriate, you will not be charged for this service.\"    Patient has given verbal consent for Telephone visit?  Yes    What phone number would you like to be contacted at? [phone]     How would you like to obtain your AVS? Mail a copy    Subjective     Donnie Ernandez is a 64 year old male who presents to clinic today for the following health issues:    HPI  ENT Symptoms             Symptoms: cc Present Absent Comment   Fever/Chills   X    Fatigue  X     Muscle Aches   X    Eye Irritation   X    Sneezing  X     Nasal Mike/Drg  X  Runny Nose / Congestion/ Stuffiness   Sinus Pressure/Pain  X  Pressure in the left   Loss of smell  X     Dental pain   X    Sore Throat   X Scratchy    Swollen Glands   X    Ear Pain/Fullness  X  Ear Itchiness, Soreness - started in the left   Cough   X    Wheeze   X    Chest Pain   X    Shortness of breath   X    Rash   X    Other         Symptom duration: Started last week with ears being plugged and itchiness, now other symptoms    Symptom " severity:  Moderate   Treatments tried:  None    Contacts:  Co workers had strep last week, but he wasn't around them      No h/o allergies in the Spring                  Reviewed and updated as needed this visit by Provider  Tobacco  Allergies  Meds  Problems  Med Hx  Surg Hx  Fam Hx         Review of Systems   ROS COMP: Constitutional, HEENT, cardiovascular, pulmonary, gi and gu systems are negative, except as otherwise noted.       Objective   Reported vitals:  There were no vitals taken for this visit.   PSYCH: Alert and oriented times 3; coherent speech, normal   rate and volume, able to articulate logical thoughts, able   to abstract reason, no tangential thoughts, no hallucinations   or delusions   RESP: No cough, no audible wheezing, able to talk in full sentences  Remainder of exam unable to be completed due to telephone visits            Assessment/Plan:  1. Acute non-recurrent maxillary sinusitis  Symptoms over a week - not improving. More sinus pressure.  - doxycycline hyclate (VIBRA-TABS) 100 MG tablet; Take 1 tablet (100 mg) by mouth 2 times daily  Dispense: 20 tablet; Refill: 0    Return in about 2 weeks (around 5/19/2020), or if symptoms worsen or fail to improve.      Phone call duration:  8 minutes    The risks, benefits and treatment options of prescribed medications or other treatments have been discussed with the patient. The patient verbalized their understanding and should call or follow up if no improvement or if they develop further problems.    MAYANK Lutz CNP

## 2020-05-05 NOTE — LETTER
[facility]  [address] [address]  [phone]          May 5, 2020    RE:  Donnie Ernandez                                                                                                                                                       [address] [facility] [address]            To whom it may concern:      Donnie Ernandez was evaluated today. He can return to work tomorrow without restrictions.        Sincerely,          Mae Jones, CNP

## 2020-06-08 ENCOUNTER — TELEPHONE (OUTPATIENT)
Dept: FAMILY MEDICINE | Facility: CLINIC | Age: 65
End: 2020-06-08

## 2020-06-08 DIAGNOSIS — R35.1 NOCTURIA: Primary | ICD-10-CM

## 2020-06-08 NOTE — TELEPHONE ENCOUNTER
Patient is contacted and told of the labs orders and offered appt with male provider but patient refuses at this time. Elisabet PORTILLO RN

## 2020-06-08 NOTE — TELEPHONE ENCOUNTER
Reason for call:  Patient reporting a symptom    Symptom or request: Pt has been having to get up and urinate numerous times nighty x 2-3 months.  Pt is due to have labs done this week and would like to have MADELYN Jones add some labs to check his urine.    Please call patient and advise.      Duration (how long have symptoms been present): 2-3 months    Have you been treated for this before? No    Additional comments:     Phone Number patient can be reached at:  Cell number on file:    Telephone Information:   Mobile [phone]       Best Time:  any    Can we leave a detailed message on this number:  YES    Call taken on 6/8/2020 at 9:01 AM by Stephanie Chan

## 2020-06-08 NOTE — TELEPHONE ENCOUNTER
I added a UA and a PSA.    Offer an appointment with one of our male providers for a prostate exam.  Mae Jones, CNP

## 2020-06-08 NOTE — TELEPHONE ENCOUNTER
Mae,    Patient is contacted.  Patient is up about 3 times a night to urinate.  No fever, no urgency no burning.  No cloudy or foul smelling urine, no blood.  Patient has never been told he has BPH.  Patient states he has never had a exam of his prostate. Does not feel comfortable having a female do this either.  Patient has never seen a urologist.  Patient wanting to add a urine to his labs for urinary frequency.  Please advise. Elisabet PORTILLO RN

## 2020-06-10 DIAGNOSIS — I10 BENIGN ESSENTIAL HYPERTENSION: ICD-10-CM

## 2020-06-10 DIAGNOSIS — R35.1 NOCTURIA: ICD-10-CM

## 2020-06-10 DIAGNOSIS — Z13.6 CARDIOVASCULAR SCREENING; LDL GOAL LESS THAN 130: ICD-10-CM

## 2020-06-10 LAB
ALBUMIN UR-MCNC: NEGATIVE MG/DL
ANION GAP SERPL CALCULATED.3IONS-SCNC: 6 MMOL/L (ref 3–14)
APPEARANCE UR: CLEAR
BILIRUB UR QL STRIP: NEGATIVE
BUN SERPL-MCNC: 18 MG/DL (ref 7–30)
CALCIUM SERPL-MCNC: 8.6 MG/DL (ref 8.5–10.1)
CHLORIDE SERPL-SCNC: 108 MMOL/L (ref 94–109)
CHOLEST SERPL-MCNC: 191 MG/DL
CO2 SERPL-SCNC: 26 MMOL/L (ref 20–32)
COLOR UR AUTO: YELLOW
CREAT SERPL-MCNC: 1.11 MG/DL (ref 0.66–1.25)
GFR SERPL CREATININE-BSD FRML MDRD: 70 ML/MIN/{1.73_M2}
GLUCOSE SERPL-MCNC: 93 MG/DL (ref 70–99)
GLUCOSE UR STRIP-MCNC: NEGATIVE MG/DL
HDLC SERPL-MCNC: 68 MG/DL
HGB UR QL STRIP: NEGATIVE
KETONES UR STRIP-MCNC: NEGATIVE MG/DL
LDLC SERPL CALC-MCNC: 101 MG/DL
LEUKOCYTE ESTERASE UR QL STRIP: NEGATIVE
NITRATE UR QL: NEGATIVE
NONHDLC SERPL-MCNC: 123 MG/DL
PH UR STRIP: 5 PH (ref 5–7)
POTASSIUM SERPL-SCNC: 4.4 MMOL/L (ref 3.4–5.3)
PSA SERPL-ACNC: 3.95 UG/L (ref 0–4)
SODIUM SERPL-SCNC: 140 MMOL/L (ref 133–144)
SOURCE: NORMAL
SP GR UR STRIP: 1.02 (ref 1–1.03)
TRIGL SERPL-MCNC: 109 MG/DL
UROBILINOGEN UR STRIP-ACNC: 0.2 EU/DL (ref 0.2–1)

## 2020-06-10 PROCEDURE — 80061 LIPID PANEL: CPT | Performed by: NURSE PRACTITIONER

## 2020-06-10 PROCEDURE — 36415 COLL VENOUS BLD VENIPUNCTURE: CPT | Performed by: NURSE PRACTITIONER

## 2020-06-10 PROCEDURE — 80048 BASIC METABOLIC PNL TOTAL CA: CPT | Performed by: NURSE PRACTITIONER

## 2020-06-10 PROCEDURE — G0103 PSA SCREENING: HCPCS | Performed by: NURSE PRACTITIONER

## 2020-06-10 PROCEDURE — 81003 URINALYSIS AUTO W/O SCOPE: CPT | Performed by: NURSE PRACTITIONER

## 2020-07-16 ENCOUNTER — TELEPHONE (OUTPATIENT)
Dept: FAMILY MEDICINE | Facility: CLINIC | Age: 65
End: 2020-07-16

## 2020-07-16 NOTE — TELEPHONE ENCOUNTER
Patient notified of the Covid-19 antibody testing.  Express labs and lab hours.  Cathi WILLIAMSON RN BSN

## 2020-07-16 NOTE — TELEPHONE ENCOUNTER
Reason for Call:  Covid Testing    Detailed comments: patient is calling and stating that he wants to be tested for Covid, to see if he actually had it earlier in the season, when he felt super sick. Please advise.    Phone Number Patient can be reached at: Cell number on file:    Telephone Information:   Mobile [phone]       Best Time: any    Can we leave a detailed message on this number? YES   Funmi Piedra  [facility] [place]       Call taken on 7/16/2020 at 1:32 PM by Funmi Dsouza

## 2020-10-07 DIAGNOSIS — F51.01 PRIMARY INSOMNIA: ICD-10-CM

## 2020-10-07 RX ORDER — ZOLPIDEM TARTRATE 12.5 MG/1
12.5 TABLET, FILM COATED, EXTENDED RELEASE ORAL
Qty: 30 TABLET | Refills: 2 | Status: SHIPPED | OUTPATIENT
Start: 2020-10-07 | End: 2020-12-28

## 2020-10-07 NOTE — LETTER
[facility]  [address] [address]  Phone: [phone]       October 7, 2020         Donnie Ernandez  [facility] [place] [id number]            Dear Donnie:    We are concerned about your health care.  We recently provided you with medication refills.  Many medications require routine follow-up with your doctor.    Your prescription(s) have been refilled for 3 months so you may have time for the above noted follow-up. Please call to schedule soon so we can assure you have an appointment before your next refills are needed.    Thank you,      CARLA Lutz / Elisabet PORTILLO RN

## 2020-10-07 NOTE — TELEPHONE ENCOUNTER
Requested Prescriptions   Pending Prescriptions Disp Refills     zolpidem ER (AMBIEN CR) 12.5 MG CR tablet [Pharmacy Med Name: zolpidem ER 12.5 mg tablet,extended release,multiphase] 30 tablet 5     Sig: Take 1 tablet (12.5 mg) by mouth nightly as needed for sleep       There is no refill protocol information for this order

## 2020-10-14 ENCOUNTER — VIRTUAL VISIT (OUTPATIENT)
Dept: FAMILY MEDICINE | Facility: CLINIC | Age: 65
End: 2020-10-14
Payer: COMMERCIAL

## 2020-10-14 DIAGNOSIS — J01.00 ACUTE NON-RECURRENT MAXILLARY SINUSITIS: Primary | ICD-10-CM

## 2020-10-14 DIAGNOSIS — R09.81 NASAL CONGESTION: ICD-10-CM

## 2020-10-14 DIAGNOSIS — J02.9 SORE THROAT: ICD-10-CM

## 2020-10-14 PROCEDURE — 99213 OFFICE O/P EST LOW 20 MIN: CPT | Mod: TEL | Performed by: FAMILY MEDICINE

## 2020-10-14 RX ORDER — DOXYCYCLINE HYCLATE 100 MG
100 TABLET ORAL 2 TIMES DAILY
Qty: 20 TABLET | Refills: 0 | Status: SHIPPED | OUTPATIENT
Start: 2020-10-14 | End: 2021-04-28

## 2020-10-14 NOTE — PATIENT INSTRUCTIONS
Start doxycycline for presumed bacterial sinusitis.    You will be called by a  in the next 24 hours to schedule your covid and strep throat test.    Quarantine at home from now until further instructed (negative covid-19 test and no fever).    Acetaminophen 500 mg orally 1-2 tablets every 4-6 hrs as needed for pain        Patient Education     Pharyngitis (Sore Throat), Report Pending    Pharyngitis (sore throat) is often due to a virus. It can also be caused by streptococcus (strep), bacteria. This is often called strep throat. Both viral and strep infections can cause throat pain that is worse when swallowing, aching all over, headache, and fever. Both types of infections are contagious. They may be spread by coughing, kissing, or touching others after touching your mouth or nose.  A test has been done to find out if you or your child have strep throat. Call this facility or your healthcare provider if you were not given your test results. If the test is positive for strep infection, you will need to take antibiotic medicines. A prescription can be called into your pharmacy at that time. If the test is negative, you probably have a viral pharyngitis. This does not need to be treated with antibiotics. Until you receive the results of the strep test, you should stay home from work. If your child is being tested, he or she should stay home from school.  Home care    Rest at home. Drink plenty of fluids so you won't get dehydrated.    If the test is positive for strep, you or your child should not go to work or school for the first 2 days of taking the antibiotics. After this time, you or your child will not be contagious. You or your child can then return to work or school when feeling better.     Use the antibiotic medicine for the full 10 days. Do not stop the medicine even if you or your child feel better. This is very important to make sure the infection is fully treated. It is also important to prevent  medicine-resistant germs from growing. If you or your child were given an antibiotic shot, no more antibiotics are needed.    Use throat lozenges or numbing throat sprays to help reduce pain. Gargling with warm salt water will also help reduce throat pain. Dissolve 1/2 teaspoon of salt in 1 glass of warm water. Children can sip on juice or a popsicle. Children 5 years and older can also suck on a lollipop or hard candy.    Don't eat salty or spicy foods or give them to your child. These can irritate the throat.  Other medicine for a child: You can give your child acetaminophen for fever, fussiness, or discomfort. In babies over 6 months of age, you may use ibuprofen instead of acetaminophen. If your child has chronic liver or kidney disease or ever had a stomach ulcer or GI bleeding, talk with your child s healthcare provider before giving these medicines. Aspirin should never be used by any child under 18 years of age who has a fever. It may cause severe liver damage.  Other medicine for an adult: You may use acetaminophen or ibuprofen to control pain or fever, unless another medicine was prescribed for this. If you have chronic liver or kidney disease or ever had a stomach ulcer or GI bleeding, talk with your healthcare provider before using these medicines.  Follow-up care  Follow up with your healthcare provider or our staff if you or your child don't get better over the next week.  When to seek medical advice  Call your healthcare provider right away if any of these occur:    Fever as directed by your healthcare provider. For children, seek care if:  ? Your child is of any age and has repeated fevers above 104 F (40 C).  ? Your child is younger than 2 years of age and has a fever of 100.4 F (38 C) for more than 1 day.  ? Your child is 2 years old or older and has a fever of 100.4 F (38 C) for more than 3 days.    New or worsening ear pain, sinus pain, or headache    Painful lumps in the back of neck    Stiff  neck    Lymph nodes are getting larger     Can t swallow liquids, a lot of drooling, or can t open mouth wide due to throat pain    Signs of dehydration, such as very dark urine or no urine, sunken eyes, dizziness    Trouble breathing or noisy breathing    Muffled voice    New rash    Other symptoms getting worse  Prevention  Here are steps you can take to help prevent an infection:    Keep good hand washing habits.    Don t have close contact with people who have sore throats, colds, or other upper respiratory infections.    Don t smoke, and stay away from secondhand smoke.    Stay up to date with of your vaccines.  Date Last Reviewed: 11/1/2017 2000-2019 [employer]. [address] [place] [address], [place] [address]. All rights reserved. This information is not intended as a substitute for professional medical care. Always follow your healthcare professional's instructions.           Patient Education     Sinusitis (Antibiotic Treatment)    The sinuses are air-filled spaces within the bones of the face. They connect to the inside of the nose. Sinusitis is an inflammation of the tissue that lines the sinuses. Sinusitis can occur during a cold. It can also happen due to allergies to pollens and other particles in the air. Sinusitis can cause symptoms of sinus congestion and a feeling of fullness. A sinus infection causes fever, headache, and facial pain. There is often green or yellow fluid draining from the nose or into the back of the throat (post-nasal drip). You have been given antibiotics to treat this condition.  Home care    Take the full course of antibiotics as instructed. Do not stop taking them, even when you feel better.    Drink plenty of water, hot tea, and other liquids. This may help thin nasal mucus. It also may help your sinuses drain fluids.    Heat may help soothe painful areas of your face. Use a towel soaked in hot water. Or,  the shower and direct the warm spray onto your  face. Using a vaporizer along with a menthol rub at night may also help soothe symptoms.     An expectorant with guaifenesin may help thin nasal mucus and help your sinuses drain fluids.    You can use an over-the-counter decongestant, unless a similar medicine was prescribed to you. Nasal sprays work the fastest. Use one that contains phenylephrine or oxymetazoline. First blow your nose gently. Then use the spray. Do not use these medicines more often than directed on the label. If you do, your symptoms may get worse. You may also take pills that contain pseudoephedrine. Don t use products that combine multiple medicines. This is because side effects may be increased. Read labels. You can also ask the pharmacist for help. (People with high blood pressure should not use decongestants. They can raise blood pressure.)    Over-the-counter antihistamines may help if allergies contributed to your sinusitis.      Do not use nasal rinses or irrigation during an acute sinus infection, unless your healthcare provider tells you to. Rinsing may spread the infection to other areas in your sinuses.    Use acetaminophen or ibuprofen to control pain, unless another pain medicine was prescribed to you. If you have chronic liver or kidney disease or ever had a stomach ulcer, talk with your healthcare provider before using these medicines. (Aspirin should never be taken by anyone under age 18 who is ill with a fever. It may cause severe liver damage.)    Don't smoke. This can make symptoms worse.  Follow-up care  Follow up with your healthcare provider or our staff if you are not better in 1 week.  When to seek medical advice  Call your healthcare provider if any of these occur:    Facial pain or headache that gets worse    Stiff neck    Unusual drowsiness or confusion    Swelling of your forehead or eyelids    Vision problems, such as blurred or double vision    Fever of 100.4 F (38 C) or higher, or as directed by your healthcare  "provider    Seizure    Breathing problems    Symptoms don't go away in 10 days  Prevention  Here are steps you can take to help prevent an infection:    Keep good hand washing habits.    Don t have close contact with people who have sore throats, colds, or other upper respiratory infections.    Don t smoke, and stay away from secondhand smoke.    Stay up to date with of your vaccines.  Date Last Reviewed: 11/1/2017 2000-2019 [employer]. [address] [place] [address], [place] [address]. All rights reserved. This information is not intended as a substitute for professional medical care. Always follow your healthcare professional's instructions.           Patient Education   After Your COVID-19 (Coronavirus) Test  You have been tested for COVID-19 (coronavirus).   If you'll have surgery in the next few days, we'll let you know ahead of time if you have the virus. Please call your surgeon's office with any questions.  For all other patients: Results are usually available within 7 to 10 days. Our testing sites do not have access to your test results.     If your test result is positive, you'll get a phone call letting you know. (A positive test means that you have the virus.)    Follow the tips under \"How do I self-isolate?\" below for 10 days (20 days if you have a weak immune system)    You don't need to be retested for COVID-19 before going back to school or work. As long as you're fever-free and feeling better, you can go back to school, work and other activities after waiting the 10 or 20 days.     If your test result is negative, you'll get a letter in the mail. (A negative test suggests you do not have the virus.)    You may also receive your results in [employer]. If you have questions after getting your results, please visit our testing website at [email or web address].  After 7 to 10 days, if you have not gotten your results:     Call [phone] ([phone]) and ask to speak " "with our COVID-19 results team.    If you're being treated at an infusion center: Call your infusion center directly.  What are the symptoms of COVID-19?  Symptoms may include any of the following: Fever, cough, trouble breathing, headache, body aches, sore throat, runny or stuffy nose, fatigue (feeling very tired), diarrhea (loose poop), and nausea or vomiting (feeling sick to the stomach or throwing up).  You may already have symptoms of COVID-19, or they may show up later.  What should I do if I have symptoms?  If you're having surgery: Call your surgeon's office.  For all other patients: Stay home and away from others (self-isolate) until ...    You've had no fever--and no medicine that reduces fever--for 1 full day (24 hours), And     Other symptoms have gotten better. For example, your cough or breathing has improved, And     At least 10 days have passed since your symptoms first started.  How do I self-isolate?    Stay in your own room, even for meals. Use your own bathroom if you can.    Stay away from others in your home. No hugging, kissing or shaking hands. No visitors.    Don't go to work, school or anywhere else.    Clean \"high touch\" surfaces often (doorknobs, counters, handles). Use household cleaning spray or wipes. You'll find a full list of  on the EPA website: www.epa.gov/pesticide-registration/list-n-disinfectants-use-against-sars-cov-2.    Cover your mouth and nose with a mask or other face covering to avoid spreading germs.    Wash your hands and face often. Use soap and water.    Caregivers in these groups are at risk for severe illness due to COVID-19:  ? People 65 years and older  ? People who live in a nursing home or long-term care facility  ? People with chronic disease (lung, heart, cancer, diabetes, kidney, liver, immunologic)  ? People who have a weakened immune system, including those who:    Are in cancer treatment    Take medicine that weakens the immune system, such as " corticosteroids    Had a bone marrow or organ transplant    Have an immune deficiency    Have poorly controlled HIV or AIDS    Are obese (body mass index of 40 or higher)    Smoke regularly    Caregivers should wear gloves while washing dishes, handling laundry and cleaning bedrooms and bathrooms.    Use caution when washing and drying laundry: Don't shake dirty laundry and use the warmest water setting that you can.    For more tips on managing your health at home, go to www.cdc.gov/coronavirus/2019-ncov/downloads/10Things.pdf.  How can I take care of myself at home?  1. Get lots of rest. Drink extra fluids (unless a doctor has told you not to).    2. Take Tylenol (acetaminophen) for fever or pain. If you have liver or kidney problems, ask your family doctor if it's okay to take Tylenol.     Adults can take either:  ? 650 mg (two 325 mg pills) every 4 to 6 hours, or   ? 1,000 mg (two 500 mg pills) every 8 hours as needed.  ? Note: Don't take more than 3,000 mg in one day. Acetaminophen is found in many medicines (both prescribed and over-the-counter medicines). Read all labels to be sure you don't take too much.  For children, check the Tylenol bottle for the right dose. The dose is based on the child's age or weight.  3. If you have other health problems (like cancer, heart failure, an organ transplant or severe kidney disease): Call your specialty clinic if you don't feel better in the next 2 days.    4. Know when to call 911. Emergency warning signs include:  ? Trouble breathing or shortness of breath  ? Chest pain or pressure that doesn't go away  ? Feeling confused like you haven't felt before, or not being able to wake up  ? Bluish-colored lips or face    5. If your doctor prescribed a blood thinner medicine: Follow their instructions.  Where can I get more information?     [employer] [place] - About COVID-19:   [email or web address]    [facility] - If You're Sick:  cdc.gov/coronavirus/2019-ncov/about/steps-when-sick.html    CDC - Ending Home Isolation: www.cdc.gov/coronavirus/2019-ncov/hcp/disposition-in-home-patients.html    CDC - Caring for Someone: www.cdc.gov/coronavirus/2019-ncov/if-you-are-sick/care-for-someone.html    [facility] - Interim Guidance for Hospital Discharge to Home: www.health.[facility].[place]./diseases/coronavirus/hcp/hospdischarge.pdf    [facility] clinical trials (COVID-19 research studies): [email or web address].[email or web address]-clinical-trials    Below are the COVID-19 hotlines at the [place] Department of Health ([facility]). Interpreters are available.  ? For health questions: Call 399-384-2498 or 1-317.333.4208 (7 a.m. to 7 p.m.)  ? For questions about schools and childcare: Call 006-775-0994 or 1-631.966.7721 (7 a.m. to 7 p.m.)    For informational purposes only. Not to replace the advice of your health care provider. Clinically reviewed by Infection Prevention and the [facility] COVID-19 Clinical Team. Copyright   2020 [employer]. All rights reserved. [employer] [id number] - Rev 8/4/20.

## 2020-10-18 DIAGNOSIS — R09.81 NASAL CONGESTION: ICD-10-CM

## 2020-10-18 DIAGNOSIS — J02.9 SORE THROAT: ICD-10-CM

## 2020-10-18 LAB
DEPRECATED S PYO AG THROAT QL EIA: NEGATIVE
SPECIMEN SOURCE: NORMAL
SPECIMEN SOURCE: NORMAL
STREP GROUP A PCR: NOT DETECTED

## 2020-10-18 PROCEDURE — 99N1174 PR STATISTIC STREP A RAPID: Performed by: FAMILY MEDICINE

## 2020-10-18 PROCEDURE — 87651 STREP A DNA AMP PROBE: CPT | Performed by: FAMILY MEDICINE

## 2020-10-18 PROCEDURE — U0003 INFECTIOUS AGENT DETECTION BY NUCLEIC ACID (DNA OR RNA); SEVERE ACUTE RESPIRATORY SYNDROME CORONAVIRUS 2 (SARS-COV-2) (CORONAVIRUS DISEASE [COVID-19]), AMPLIFIED PROBE TECHNIQUE, MAKING USE OF HIGH THROUGHPUT TECHNOLOGIES AS DESCRIBED BY CMS-2020-01-R: HCPCS | Performed by: FAMILY MEDICINE

## 2020-10-19 LAB
SARS-COV-2 RNA SPEC QL NAA+PROBE: NOT DETECTED
SPECIMEN SOURCE: NORMAL

## 2020-10-21 ENCOUNTER — VIRTUAL VISIT (OUTPATIENT)
Dept: FAMILY MEDICINE | Facility: CLINIC | Age: 65
End: 2020-10-21
Payer: COMMERCIAL

## 2020-10-21 DIAGNOSIS — J31.0 RHINITIS, UNSPECIFIED TYPE: ICD-10-CM

## 2020-10-21 DIAGNOSIS — J01.90 ACUTE NON-RECURRENT SINUSITIS, UNSPECIFIED LOCATION: Primary | ICD-10-CM

## 2020-10-21 PROCEDURE — 99213 OFFICE O/P EST LOW 20 MIN: CPT | Mod: TEL | Performed by: FAMILY MEDICINE

## 2020-10-21 RX ORDER — FLUTICASONE PROPIONATE 50 MCG
SPRAY, SUSPENSION (ML) NASAL
Qty: 16 G | Refills: 11 | Status: SHIPPED | OUTPATIENT
Start: 2020-10-21 | End: 2023-03-16
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
## 2020-10-21 NOTE — LETTER
[facility]  [address] [address]  [phone]          October 21, 2020    RE:  Donnie Ernandez                                                                                                                                                       [address] [place] [id number]            To whom it may concern:    Donnie reports that his illness symptoms have resolved completely. He may return to work with no restrictions tomorrow, October 22, 2020.       Sincerely,        Sudhir Fraga MD

## 2020-10-21 NOTE — PATIENT INSTRUCTIONS
You reported that all your symptoms have resolved.  Be sure to complete the 10-day course of doxycycline.  You may continue having a serving or two of activia yogurt daily as it seemd to have helped you while on the antibiotics.  You may also take over the counter famotidine (Pepcid) 20 mg once or twice a day for the rest of the duration of your antibiotics if you have acid reflux.    No additional treatment is needed.    You may return to work as you have reported resolution of symptoms and no fever.

## 2020-12-10 ENCOUNTER — APPOINTMENT (OUTPATIENT)
Dept: GENERAL RADIOLOGY | Facility: CLINIC | Age: 65
End: 2020-12-10
Attending: EMERGENCY MEDICINE
Payer: COMMERCIAL

## 2020-12-10 ENCOUNTER — HOSPITAL ENCOUNTER (EMERGENCY)
Facility: CLINIC | Age: 65
Discharge: HOME | End: 2020-12-10
Attending: EMERGENCY MEDICINE | Admitting: EMERGENCY MEDICINE
Payer: COMMERCIAL

## 2020-12-10 ENCOUNTER — TELEPHONE (OUTPATIENT)
Dept: NURSING | Facility: CLINIC | Age: 65
End: 2020-12-10

## 2020-12-10 VITALS
DIASTOLIC BLOOD PRESSURE: 70 MMHG | RESPIRATION RATE: 16 BRPM | OXYGEN SATURATION: 97 % | TEMPERATURE: 98.3 F | SYSTOLIC BLOOD PRESSURE: 128 MMHG | BODY MASS INDEX: 28.08 KG/M2 | HEART RATE: 68 BPM | WEIGHT: 174 LBS

## 2020-12-10 DIAGNOSIS — Z20.822 SUSPECTED COVID-19 VIRUS INFECTION: ICD-10-CM

## 2020-12-10 LAB
FLUAV+FLUBV AG SPEC QL: NEGATIVE
FLUAV+FLUBV AG SPEC QL: NEGATIVE
SARS-COV-2 RNA SPEC QL NAA+PROBE: ABNORMAL
SPECIMEN SOURCE: ABNORMAL
SPECIMEN SOURCE: NORMAL

## 2020-12-10 PROCEDURE — 71045 X-RAY EXAM CHEST 1 VIEW: CPT

## 2020-12-10 PROCEDURE — 99284 EMERGENCY DEPT VISIT MOD MDM: CPT | Mod: 25 | Performed by: EMERGENCY MEDICINE

## 2020-12-10 PROCEDURE — 87804 INFLUENZA ASSAY W/OPTIC: CPT | Performed by: EMERGENCY MEDICINE

## 2020-12-10 PROCEDURE — U0003 INFECTIOUS AGENT DETECTION BY NUCLEIC ACID (DNA OR RNA); SEVERE ACUTE RESPIRATORY SYNDROME CORONAVIRUS 2 (SARS-COV-2) (CORONAVIRUS DISEASE [COVID-19]), AMPLIFIED PROBE TECHNIQUE, MAKING USE OF HIGH THROUGHPUT TECHNOLOGIES AS DESCRIBED BY CMS-2020-01-R: HCPCS | Performed by: EMERGENCY MEDICINE

## 2020-12-10 PROCEDURE — 99283 EMERGENCY DEPT VISIT LOW MDM: CPT | Performed by: EMERGENCY MEDICINE

## 2020-12-10 ASSESSMENT — ENCOUNTER SYMPTOMS
FEVER: 0
VOICE CHANGE: 0
NAUSEA: 0
ABDOMINAL PAIN: 0
DIFFICULTY URINATING: 0
CHILLS: 0
SINUS PRESSURE: 0
RHINORRHEA: 1
SORE THROAT: 1
SINUS PAIN: 0
TROUBLE SWALLOWING: 0
HEADACHES: 0
COUGH: 1
VOMITING: 0
SHORTNESS OF BREATH: 0
MYALGIAS: 1
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
## 2020-12-10 NOTE — ED TRIAGE NOTES
Pt presents via private vehicle with complaints of a cough, runny nose, sore throat and body aches. Pt has taken nothing for these symptoms.  He has not checked for a fever.

## 2020-12-10 NOTE — DISCHARGE INSTRUCTIONS
"Discharge Instructions for COVID-19 Patients  You have--or may have--COVID-19. Please follow the instructions listed below.   If you have a weakened immune system, discuss with your doctor any other actions you need to take.  How can I protect others?  If you have symptoms (fever, cough, body aches or trouble breathing):  Stay home and away from others (self-isolate) until:  At least 10 days have passed since your symptoms started, And   You've had no fever--and no medicine that reduces fever--for 1 full day (24 hours), And    Your other symptoms have resolved (gotten better).  If you don't show symptoms, but testing showed that you have COVID-19:  Stay home and away from others (self-isolate). Follow the tips under \"How do I self-isolate?\" below for 10 days (20 days if you have a weak immune system).  You don't need to be retested for COVID-19 before going back to school or work. As long as you're fever-free and feeling better, you can go back to school, work and other activities after waiting the 10 or 20 days.   How do I self-isolate?  Stay in your own room, even for meals. Use your own bathroom if you can.  Stay away from others in your home. No hugging, kissing or shaking hands. No visitors.  Don't go to work, school or anywhere else.  Clean \"high touch\" surfaces often (doorknobs, counters, handles). Use household cleaning spray or wipes. You'll find a full list of  on the EPA website: www.epa.gov/pesticide-registration/list-n-disinfectants-use-against-sars-cov-2.  Cover your mouth and nose with a mask or other face covering to avoid spreading germs.  Wash your hands and face often. Use soap and water.  Caregivers in these groups are at risk for severe illness due to COVID-19:  People 65 years and older  People who live in a nursing home or long-term care facility  People with chronic disease (lung, heart, cancer, diabetes, kidney, liver, immunologic)  People who have a weakened immune system, including " those who:  Are in cancer treatment  Take medicine that weakens the immune system, such as corticosteroids  Had a bone marrow or organ transplant  Have an immune deficiency  Have poorly controlled HIV or AIDS  Are obese (body mass index of 40 or higher)  Smoke regularly  Caregivers should wear gloves while washing dishes, handling laundry and cleaning bedrooms and bathrooms.  Use caution when washing and drying laundry: Don't shake dirty laundry and use the warmest water setting that you can.  For more tips on managing your health at home, go to www.cdc.gov/coronavirus/2019-ncov/downloads/10Things.pdf.  How can I take care of myself at home?  Get lots of rest. Drink extra fluids (unless a doctor has told you not to).    Take Tylenol (acetaminophen) for fever or pain. If you have liver or kidney problems, ask your family doctor if it's okay to take Tylenol.     Adults can take either:  650 mg (two 325 mg pills) every 4 to 6 hours, or   1,000 mg (two 500 mg pills) every 8 hours as needed.  Note: Don't take more than 3,000 mg in one day. Acetaminophen is found in many medicines (both prescribed and over-the-counter medicines). Read all labels to be sure you don't take too much.   For children, check the Tylenol bottle for the right dose. The dose is based on the child's age or weight.  If you have other health problems (like cancer, heart failure, an organ transplant or severe kidney disease): Call your specialty clinic if you don't feel better in the next 2 days.    Know when to call 911. Emergency warning signs include:  Trouble breathing or shortness of breath  Pain or pressure in the chest that doesn't go away  Feeling confused like you haven't felt before, or not being able to wake up  Bluish-colored lips or face    Your doctor may have prescribed a blood thinner medicine. Follow their instructions.  Where can I get more information?   [facility] - About COVID-19: [email or web address]  Formerly named [facility] - What to  Do If You're Sick: www.cdc.gov/coronavirus/2019-ncov/about/steps-when-sick.html  CDC - Ending Home Isolation: www.cdc.gov/coronavirus/2019-ncov/hcp/disposition-in-home-patients.html  CDC - Caring for Someone: www.cdc.gov/coronavirus/2019-ncov/if-you-are-sick/care-for-someone.html  [facility] - Interim Guidance for Hospital Discharge to Home: www.[facility].[place]./diseases/coronavirus/hcp/hospdischarge.pdf  [facility] clinical trials (COVID-19 research studies): clinicalaffairs.[facility]-clinical-trials  Below are the COVID-19 hotlines at the [place] [facility] ([facility]). Interpreters are available.  For health questions: Call 752-134-5155 or 1-699.635.8894 (7 a.m. to 7 p.m.)  For questions about schools and childcare: Call 040-591-9738 or 1-175.468.7901 (7 a.m. to 7 p.m.)    For informational purposes only. Not to replace the advice of your health care provider. Clinically reviewed by the Infection Prevention Team. Copyright   2020 [facility]. All rights reserved. ZOZI [id number] - REV 08/04/20.

## 2020-12-10 NOTE — ED AVS SNAPSHOT
[facility] Emergency Dept  [address] [address]  Phone: [phone]  Fax: [phone]                                    Donnie Ernandez   MRN: [id number]    Department: [facility] Emergency Dept   Date of Visit: 12/10/2020           After Visit Summary Signature Page    I have received my discharge instructions, and my questions have been answered. I have discussed any challenges I see with this plan with the nurse or doctor.    ..........................................................................................................................................  Patient/Patient Representative Signature      ..........................................................................................................................................  Patient Representative Print Name and Relationship to Patient    ..................................................               ................................................  Date                                   Time    ..........................................................................................................................................  Reviewed by Signature/Title    ...................................................              ..............................................  Date                                               Time          22EPIC Rev 08/18

## 2020-12-11 ENCOUNTER — PATIENT OUTREACH (OUTPATIENT)
Dept: CARE COORDINATION | Facility: CLINIC | Age: 65
End: 2020-12-11

## 2020-12-11 DIAGNOSIS — U07.1 INFECTION DUE TO 2019 NOVEL CORONAVIRUS: ICD-10-CM

## 2020-12-11 NOTE — TELEPHONE ENCOUNTER
"Coronavirus (COVID-19) Notification    Caller Name (Patient, parent, daughter/son, grandparent, etc)  The patient, Donnie.    Care advice reviewed with him. He verbalized understanding.    Reason for call  Notify of Positive Coronavirus (COVID-19) lab results, assess symptoms,  review [facility] recommendations    Lab Result    Lab test:  2019-nCoV rRt-PCR or SARS-CoV-2 PCR    Oropharyngeal AND/OR nasopharyngeal swabs is POSITIVE for 2019-nCoV RNA/SARS-COV-2 PCR (COVID-19 virus)    RN Recommendations/Instructions per  [facility] Coronavirus COVID-19 recommendations    Brief introduction script  Introduce self then review script:  \"I am calling on behalf of [employer].  We were notified that your Coronavirus test (COVID-19) for was POSITIVE for the virus.  I have some information to relay to you but first I wanted to mention that the [place] Dept of Health will be contacting you shortly [it's possible MD already called Patient] to talk to you more about how you are feeling and other people you have had contact with who might now also have the virus.  Also, [facility] is Partnering with the [facility] for Covid-19 research, you may be contacted directly by research staff.\"    Assessment (Inquire about Patient's current symptoms)   Assessment   Current Symptoms at time of phone call: (if no symptoms, document No symptoms] HA, sneezing, runny nose, nasal congestion. Fatigue.   Symptoms onset (if applicable) 12/09/2020     If at time of call, Patients symptoms hare worsened, the Patient should contact 911 or have someone drive them to Emergency Dept promptly:      If Patient calling 911, inform 911 personal that you have tested positive for the Coronavirus (COVID-19).  Place mask on and await 911 to arrive.    If Emergency Dept, If possible, please have another adult drive you to the Emergency Dept but you need to wear mask when in contact with other people.      Review information with " Patient    Your result was positive. This means you have COVID-19 (coronavirus).  We have sent you a letter that reviews the information that I'll be reviewing with you now.    How can I protect others?    If you have symptoms: stay home and away from others (self-isolate) until:    You've had no fever--and no medicine that reduces fever--for 1 full day (24 hours). And       Your other symptoms have gotten better. For example, your cough or breathing has improved. And     At least 10 days have passed since your symptoms started. (If you've been told by a doctor that you have a weak immune system, wait 20 days.)     If you don't have symptoms: Stay home and away from others (self-isolate) until at least 10 days have passed since your first positive COVID-19 test. (Date test collected)    During this time:    Stay in your own room, including for meals. Use your own bathroom if you can.    Stay away from others in your home. No hugging, kissing or shaking hands. No visitors.     Don't go to work, school or anywhere else.     Clean  high touch  surfaces often (doorknobs, counters, handles, etc.). Use a household cleaning spray or wipes. You'll find a full list on the EPA website at www.epa.gov/pesticide-registration/list-n-disinfectants-use-against-sars-cov-2.     Cover your mouth and nose with a mask, tissue or other face covering to avoid spreading germs.    Wash your hands and face often with soap and water.    Caregivers in these groups are at risk for severe illness due to COVID-19:  o People 65 years and older  o People who live in a nursing home or long-term care facility  o People with chronic disease (lung, heart, cancer, diabetes, kidney, liver, immunologic)  o People who have a weakened immune system, including those who:  - Are in cancer treatment  - Take medicine that weakens the immune system, such as corticosteroids  - Had a bone marrow or organ transplant  - Have an immune deficiency  - Have poorly  controlled HIV or AIDS  - Are obese (body mass index of 40 or higher)  - Smoke regularly    Caregivers should wear gloves while washing dishes, handling laundry and cleaning bedrooms and bathrooms.    Wash and dry laundry with special caution. Don't shake dirty laundry, and use the warmest water setting you can.    If you have a weakened immune system, ask your doctor about other actions you should take.    For more tips, go to www.cdc.gov/coronavirus/2019-ncov/downloads/10Things.pdf.    You should not go back to work until you meet the guidelines above for ending your home isolation. You don't need to be retested for COVID-19 before going back to work--studies show that you won't spread the virus if it's been at least 10 days since your symptoms started (or 20 days, if you have a weak immune system).    Employers: This document serves as formal notice of your employee's medical guidelines for going back to work. They must meet the above guidelines before going back to work in person.    How can I take care of myself?    1. Get lots of rest. Drink extra fluids (unless a doctor has told you not to).    2. Take Tylenol (acetaminophen) for fever or pain. If you have liver or kidney problems, ask your family doctor if it's okay to take Tylenol.     Take either:     650 mg (two 325 mg pills) every 4 to 6 hours, or     1,000 mg (two 500 mg pills) every 8 hours as needed.     Note: Don't take more than 3,000 mg in one day. Acetaminophen is found in many medicines (both prescribed and over-the-counter medicines). Read all labels to be sure you don't take too much.    For children, check the Tylenol bottle for the right dose (based on their age or weight).    3. If you have other health problems (like cancer, heart failure, an organ transplant or severe kidney disease): Call your specialty clinic if you don't feel better in the next 2 days.    4. Know when to call 911: Emergency warning signs include:    Trouble breathing or  shortness of breath    Pain or pressure in the chest that doesn't go away    Feeling confused like you haven't felt before, or not being able to wake up    Bluish-colored lips or face    5. Sign up for [email or web address]. We know it's scary to hear that you have COVID-19. We want to track your symptoms to make sure you're okay over the next 2 weeks. Please look for an email from [email or web address]--this is a free, online program that we'll use to keep in touch. To sign up, follow the link in the email. Learn more at [email or web address].    Where can I get more information?    [facility]: [email or web address]    Coronavirus Basics: www.health.[facility].[place]/diseases/coronavirus/basics.html    What to Do If You're Sick: www.cdc.gov/coronavirus/2019-ncov/about/steps-when-sick.html    Ending Home Isolation: www.cdc.gov/coronavirus/2019-ncov/hcp/disposition-in-home-patients.html     Caring for Someone with COVID-19: www.cdc.gov/coronavirus/2019-ncov/if-you-are-sick/care-for-someone.html     [facility] clinical trials (COVID-19 research studies): clinicalaffairs.[facility]/n-clinical-trials     A Positive COVID-19 letter will be sent via [employer] or the mail. (Exception, no letters sent to Presurgerical/Preprocedure Patients)    Roya Bryant

## 2020-12-11 NOTE — PROGRESS NOTES
Clinic Care Coordination Contact    Clinic Care Coordination Contact  Care Coordination Communication      Clinical Data: Patient meeting criteria for ambulatory care coordination referral due to COVID-19 diagnosis on 12/10/20 and a recent ED/hospitalization encounter on 12/10/20.    Patient Contact:   Spoke with:   Introduced self and role of care coordination.   Home COVID-19 instructions and resources were reviewed with patient/caregiver.   Patient reports no known fevers. He has a runny nose and head congestion. Denies chest pain or shortness of breath    Do you have any questions about your medications? No, we reviewed in detail  Medication reconciliation completed:   Medication reconciliation status: Medications reviewed and reconciled.  Continue medications without change.    Follow up appointment is scheduled for n/a; we reviewed scheduling options if needed. Patient feels he is navigating at home care very well at present time.  Provided 24 Hour Nurse Line and/or 24 Hour Appointment Scheduling: Yes    Get Well Loop referral placed and discussed with patient:   Order was placed by ED provider on 12/10 but patient does not have an email address or smart phone so this is not a feasible option for him to pursue    Patient accepts Care Coordination enrollment: No, patient declines at this time.   No further outreaches planned. Letter sent to patient with COVID instructions by central results team per chart review.       OBEY YanezN, RN   [facility]  - Clinic Care Coordinator

## 2020-12-27 DIAGNOSIS — F51.01 PRIMARY INSOMNIA: ICD-10-CM

## 2020-12-28 RX ORDER — ZOLPIDEM TARTRATE 12.5 MG/1
12.5 TABLET, FILM COATED, EXTENDED RELEASE ORAL
Qty: 30 TABLET | Refills: 0 | Status: SHIPPED | OUTPATIENT
Start: 2020-12-28 | End: 2021-03-04

## 2020-12-28 NOTE — TELEPHONE ENCOUNTER
Routing refill request to provider for review/approval because:  Drug not on the [facility] refill protocol     Jenniffer Ha RN

## 2021-03-03 ENCOUNTER — VIRTUAL VISIT (OUTPATIENT)
Dept: FAMILY MEDICINE | Facility: CLINIC | Age: 66
End: 2021-03-03
Payer: COMMERCIAL

## 2021-03-03 DIAGNOSIS — R19.7 DIARRHEA OF PRESUMED INFECTIOUS ORIGIN: Primary | ICD-10-CM

## 2021-03-03 DIAGNOSIS — F51.01 PRIMARY INSOMNIA: ICD-10-CM

## 2021-03-03 DIAGNOSIS — R19.7 DIARRHEA OF PRESUMED INFECTIOUS ORIGIN: ICD-10-CM

## 2021-03-03 LAB
C COLI+JEJUNI+LARI FUSA STL QL NAA+PROBE: NOT DETECTED
C DIFF TOX B STL QL: NEGATIVE
EC STX1 GENE STL QL NAA+PROBE: NOT DETECTED
EC STX2 GENE STL QL NAA+PROBE: NOT DETECTED
ENTERIC PATHOGEN COMMENT: NORMAL
NOROV GI+II ORF1-ORF2 JNC STL QL NAA+PR: NOT DETECTED
RVA NSP5 STL QL NAA+PROBE: NOT DETECTED
SALMONELLA SP RPOD STL QL NAA+PROBE: NOT DETECTED
SHIGELLA SP+EIEC IPAH STL QL NAA+PROBE: NOT DETECTED
SPECIMEN SOURCE: NORMAL
V CHOL+PARA RFBL+TRKH+TNAA STL QL NAA+PR: NOT DETECTED
Y ENTERO RECN STL QL NAA+PROBE: NOT DETECTED

## 2021-03-03 PROCEDURE — 87506 IADNA-DNA/RNA PROBE TQ 6-11: CPT | Performed by: NURSE PRACTITIONER

## 2021-03-03 PROCEDURE — 87493 C DIFF AMPLIFIED PROBE: CPT | Mod: 59 | Performed by: NURSE PRACTITIONER

## 2021-03-03 PROCEDURE — 99213 OFFICE O/P EST LOW 20 MIN: CPT | Mod: TEL | Performed by: NURSE PRACTITIONER

## 2021-03-03 NOTE — LETTER
[facility]  [address] [place] [address]  Phone: [phone]    March 3, 2021        Donnie Ernandez  [address] [place] [address]          To whom it may concern:    RE: Donnie Ernandez    Patient was seen and treated today at our clinic and missed work on 3/1/21-3/3/21  Patient may return to work 3/8/21, or sooner if feeling better with the following:  No working or lifting restrictions    Please contact me for questions or concerns.      Sincerely,        MAYANK Chaudhari CNP

## 2021-03-03 NOTE — PROGRESS NOTES
Donnie is a 65 year old who is being evaluated via a billable telephone visit.      What phone number would you like to be contacted at? [phone]  How would you like to obtain your AVS? Mail a copy    Assessment & Plan     Diarrhea of presumed infectious origin  -discussed BRAT diet  -recommended to continue good hydration, drink electrolyte drinks  -can start Imodium 2 mg as needed after each loose stool, up to 6 capsules per day  -recommended stool testing to rule out possible infectious etiology   - Enteric Bacteria and Virus Panel by SAMUEL Stool; Future  - Clostridium difficile Toxin B PCR; Future         See Patient Instructions    Return in about 1 week (around 3/10/2021), or if symptoms worsen or fail to improve.    MAYANK Chaudhari CNP  [facility]    Subjective   Donnie is a 65 year old who presents for the following health issues     HPI       Diarrhea  Onset/Duration: Sunday   Description:       Consistency of stool: watery and runny       Blood in stool: no       Number of loose stools past 24 hours: 6  Progression of Symptoms: improving- feels better today than yesterday   Accompanying signs and symptoms:       Fever: no       Nausea/Vomiting: no       Abdominal pain: no, but has in the last few days. Upset/uncomfortable.        Weight loss: no       Episodes of constipation: no  History   Ill contacts: no  Recent use of antibiotics: no  Recent travels: no  Recent medication-new or changes(Rx or OTC): no  Ate at [employer] on Sunday; by dinner had a bad stomach ache   Precipitating or alleviating factors: None  Therapies tried and outcome: increase fluids         Review of Systems   Constitutional, HEENT, cardiovascular, pulmonary, gi and gu systems are negative, except as otherwise noted.      Objective    Vitals - Patient Reported  Weight (Patient Reported): 77.1 kg (170 lb)      Vitals:  No vitals were obtained today due to virtual visit.    Physical Exam   healthy, alert and no  distress  PSYCH: Alert and oriented times 3; coherent speech, normal   rate and volume, able to articulate logical thoughts, able   to abstract reason, no tangential thoughts, no hallucinations   or delusions  His affect is normal  RESP: No cough, no audible wheezing, able to talk in full sentences  Remainder of exam unable to be completed due to telephone visits                Phone call duration: 8 minutes

## 2021-03-04 RX ORDER — ZOLPIDEM TARTRATE 12.5 MG/1
12.5 TABLET, FILM COATED, EXTENDED RELEASE ORAL
Qty: 30 TABLET | Refills: 5 | Status: SHIPPED | OUTPATIENT
Start: 2021-03-04 | End: 2021-08-23

## 2021-03-22 ENCOUNTER — VIRTUAL VISIT (OUTPATIENT)
Dept: FAMILY MEDICINE | Facility: CLINIC | Age: 66
End: 2021-03-22
Payer: COMMERCIAL

## 2021-03-22 DIAGNOSIS — F43.21 GRIEF: Primary | ICD-10-CM

## 2021-03-22 DIAGNOSIS — K52.9 CHRONIC DIARRHEA: ICD-10-CM

## 2021-03-22 PROCEDURE — 99214 OFFICE O/P EST MOD 30 MIN: CPT | Mod: TEL | Performed by: NURSE PRACTITIONER

## 2021-03-22 RX ORDER — LORAZEPAM 1 MG/1
1 TABLET ORAL EVERY 6 HOURS PRN
Qty: 20 TABLET | Refills: 0 | Status: SHIPPED | OUTPATIENT
Start: 2021-03-22 | End: 2021-04-28

## 2021-03-22 NOTE — PROGRESS NOTES
Donnie is a 65 year old who is being evaluated via a billable telephone visit.      What phone number would you like to be contacted at? [phone]  How would you like to obtain your AVS? Mail a copy    Assessment & Plan     Grief  Patient asking for something to take during the day - anxious, shaking.  May have Ativan as prescribed.  Advised to not take in the evening or night due to the Ambien - discussed that there is no documented safe interval to space these medications out.  Advised no driving or alcohol.  - LORazepam (ATIVAN) 1 MG tablet; Take 1 tablet (1 mg) by mouth every 6 hours as needed for anxiety Do not take if driving or with alcohol    Chronic diarrhea  For years.  Future work up to include:  - **CBC with platelets FUTURE anytime; Future  - **Comprehensive metabolic panel FUTURE anytime; Future  - **TSH with free T4 reflex FUTURE anytime; Future  - Tissue transglutaminase chad IgA and IgG; Future  - IgA; Future  - Lipase; Future  - Amylase; Future  - CT Abdomen Pelvis w Contrast; Future    If all normal, consider colonoscopy vs GI consult.    Return in about 2 weeks (around 4/5/2021).    The risks, benefits and treatment options of prescribed medications or other treatments have been discussed with the patient. The patient verbalized their understanding and should call or follow up if no improvement or if they develop further problems.    MAYANK [facility]   Donnie is a 65 year old who presents for the following health issues     HPI     Recheck Diarrhea  Onset/Duration: 2/28/2021  Chronic for years  Comes and goes  Getting worse  Description:       Consistency of stool: watery - sometimes oily       Blood in stool: no       Number of loose stools past 24 hours: 12  Progression of Symptoms: worsening  Accompanying signs and symptoms:       Fever: no       Nausea/Vomiting: no       Abdominal pain: YES- stomach ache-  Worse with imodium        Weight loss: YES       Episodes of constipation: no  History   Ill contacts: no  Recent use of antibiotics: no  Recent travels: no  Recent medication-new or changes(Rx or OTC): no  Precipitating or alleviating factors: None  Therapies tried and outcome: BRAT diet;  Good hydration;  Imodium-  Stool studies all negative.   Imodium will help for like a day, comes right back    Has had stool studies multiple times - all negative.  Last colonoscopy       Concern - grief reaction  Onset: 3/20/2021 - found son  at home  Description: patient not sure what he can take due to ambien RX  But feels he may need something  Intensity: moderate  Progression of Symptoms:  same        Review of Systems   Constitutional, HEENT, cardiovascular, pulmonary, gi and gu systems are negative, except as otherwise noted.      Objective         Vitals:  No vitals were obtained today due to virtual visit.    Physical Exam   PSYCH: Alert and oriented times 3; coherent speech, normal   rate and volume, able to articulate logical thoughts, able   to abstract reason, no tangential thoughts, no hallucinations   or delusions   RESP: No cough, no audible wheezing, able to talk in full sentences  Remainder of exam unable to be completed due to telephone visits                Phone call duration: 10 minutes

## 2021-03-25 DIAGNOSIS — I10 BENIGN ESSENTIAL HYPERTENSION: ICD-10-CM

## 2021-03-25 NOTE — TELEPHONE ENCOUNTER
"Requested Prescriptions   Pending Prescriptions Disp Refills     losartan (COZAAR) 100 MG tablet [Pharmacy Med Name: losartan 100 mg tablet] 90 tablet 3     Sig: Take 1 tablet (100 mg) by mouth daily       Angiotensin-II Receptors Passed - 3/25/2021  8:00 AM        Passed - Last blood pressure under 140/90 in past 12 months     BP Readings from Last 3 Encounters:   12/10/20 128/70   12/12/19 138/68   11/13/19 124/68                 Passed - Recent (12 mo) or future (30 days) visit within the authorizing provider's specialty     Patient has had an office visit with the authorizing provider or a provider within the authorizing providers department within the previous 12 mos or has a future within next 30 days. See \"Patient Info\" tab in inbasket, or \"Choose Columns\" in Meds & Orders section of the refill encounter.              Passed - Medication is active on med list        Passed - Patient is age 18 or older        Passed - Normal serum creatinine on file in past 12 months     Recent Labs   Lab Test 06/10/20  1142   CR 1.11       Ok to refill medication if creatinine is low          Passed - Normal serum potassium on file in past 12 months     Recent Labs   Lab Test 06/10/20  1142   POTASSIUM 4.4                         "

## 2021-03-29 RX ORDER — LOSARTAN POTASSIUM 100 MG/1
100 TABLET ORAL DAILY
Qty: 30 TABLET | Refills: 1 | Status: SHIPPED | OUTPATIENT
Start: 2021-03-29 | End: 2022-02-25

## 2021-03-29 NOTE — TELEPHONE ENCOUNTER
Prescription approved per [facility] Refill Protocol.    Mckenzie ERICKSON RN, BSN

## 2021-03-31 ENCOUNTER — HOSPITAL ENCOUNTER (OUTPATIENT)
Dept: CT IMAGING | Facility: CLINIC | Age: 66
Discharge: HOME | End: 2021-03-31
Attending: NURSE PRACTITIONER | Admitting: NURSE PRACTITIONER
Payer: COMMERCIAL

## 2021-03-31 DIAGNOSIS — K52.9 CHRONIC DIARRHEA: ICD-10-CM

## 2021-03-31 LAB
ALBUMIN SERPL-MCNC: 3.3 G/DL (ref 3.4–5)
ALP SERPL-CCNC: 90 U/L (ref 40–150)
ALT SERPL W P-5'-P-CCNC: 35 U/L (ref 0–70)
AMYLASE SERPL-CCNC: 61 U/L (ref 30–110)
ANION GAP SERPL CALCULATED.3IONS-SCNC: 8 MMOL/L (ref 3–14)
AST SERPL W P-5'-P-CCNC: 28 U/L (ref 0–45)
BILIRUB SERPL-MCNC: 0.4 MG/DL (ref 0.2–1.3)
BUN SERPL-MCNC: 27 MG/DL (ref 7–30)
CALCIUM SERPL-MCNC: 8.3 MG/DL (ref 8.5–10.1)
CHLORIDE SERPL-SCNC: 108 MMOL/L (ref 94–109)
CO2 SERPL-SCNC: 24 MMOL/L (ref 20–32)
CREAT SERPL-MCNC: 1.29 MG/DL (ref 0.66–1.25)
ERYTHROCYTE [DISTWIDTH] IN BLOOD BY AUTOMATED COUNT: 12.6 % (ref 10–15)
GFR SERPL CREATININE-BSD FRML MDRD: 58 ML/MIN/{1.73_M2}
GLUCOSE SERPL-MCNC: 86 MG/DL (ref 70–99)
HCT VFR BLD AUTO: 36 % (ref 40–53)
HGB BLD-MCNC: 11.9 G/DL (ref 13.3–17.7)
LIPASE SERPL-CCNC: 171 U/L (ref 73–393)
MCH RBC QN AUTO: 31.6 PG (ref 26.5–33)
MCHC RBC AUTO-ENTMCNC: 33.1 G/DL (ref 31.5–36.5)
MCV RBC AUTO: 96 FL (ref 78–100)
PLATELET # BLD AUTO: 381 10E9/L (ref 150–450)
POTASSIUM SERPL-SCNC: 4.7 MMOL/L (ref 3.4–5.3)
PROT SERPL-MCNC: 7.5 G/DL (ref 6.8–8.8)
RBC # BLD AUTO: 3.76 10E12/L (ref 4.4–5.9)
SODIUM SERPL-SCNC: 140 MMOL/L (ref 133–144)
TSH SERPL DL<=0.005 MIU/L-ACNC: 1.39 MU/L (ref 0.4–4)
WBC # BLD AUTO: 8.7 10E9/L (ref 4–11)

## 2021-03-31 PROCEDURE — 80053 COMPREHEN METABOLIC PANEL: CPT | Performed by: NURSE PRACTITIONER

## 2021-03-31 PROCEDURE — 85027 COMPLETE CBC AUTOMATED: CPT | Performed by: NURSE PRACTITIONER

## 2021-03-31 PROCEDURE — 84443 ASSAY THYROID STIM HORMONE: CPT | Performed by: NURSE PRACTITIONER

## 2021-03-31 PROCEDURE — 82150 ASSAY OF AMYLASE: CPT | Performed by: NURSE PRACTITIONER

## 2021-03-31 PROCEDURE — 250N000011 HC RX IP 250 OP 636: Performed by: NURSE PRACTITIONER

## 2021-03-31 PROCEDURE — 83516 IMMUNOASSAY NONANTIBODY: CPT | Mod: 59 | Performed by: NURSE PRACTITIONER

## 2021-03-31 PROCEDURE — 82784 ASSAY IGA/IGD/IGG/IGM EACH: CPT | Performed by: NURSE PRACTITIONER

## 2021-03-31 PROCEDURE — 36415 COLL VENOUS BLD VENIPUNCTURE: CPT | Performed by: NURSE PRACTITIONER

## 2021-03-31 PROCEDURE — 83690 ASSAY OF LIPASE: CPT | Performed by: NURSE PRACTITIONER

## 2021-03-31 PROCEDURE — 250N000009 HC RX 250: Performed by: NURSE PRACTITIONER

## 2021-03-31 PROCEDURE — 74177 CT ABD & PELVIS W/CONTRAST: CPT

## 2021-03-31 PROCEDURE — 83516 IMMUNOASSAY NONANTIBODY: CPT | Performed by: NURSE PRACTITIONER

## 2021-03-31 RX ORDER — IOPAMIDOL 755 MG/ML
85 INJECTION, SOLUTION INTRAVASCULAR ONCE
Status: COMPLETED | OUTPATIENT
Start: 2021-03-31 | End: 2021-03-31

## 2021-03-31 RX ADMIN — IOPAMIDOL 85 ML: 755 INJECTION, SOLUTION INTRAVENOUS at 10:26

## 2021-03-31 RX ADMIN — SODIUM CHLORIDE 61 ML: 9 INJECTION, SOLUTION INTRAVENOUS at 10:26

## 2021-04-01 LAB — IGA SERPL-MCNC: 468 MG/DL (ref 84–499)

## 2021-04-02 ENCOUNTER — TELEPHONE (OUTPATIENT)
Dept: FAMILY MEDICINE | Facility: CLINIC | Age: 66
End: 2021-04-02

## 2021-04-02 LAB
TTG IGA SER-ACNC: 2 U/ML
TTG IGG SER-ACNC: 1 U/ML

## 2021-04-02 NOTE — TELEPHONE ENCOUNTER
Patient called back about lab and ct results PCP was already gone for the day he will call Monday.    Jennifer Rodriguez CSS on 4/2/2021 at 3:30 PM

## 2021-04-05 DIAGNOSIS — R19.7 DIARRHEA, UNSPECIFIED TYPE: Primary | ICD-10-CM

## 2021-04-05 DIAGNOSIS — R93.5 ABNORMAL CT OF THE ABDOMEN: ICD-10-CM

## 2021-04-05 RX ORDER — DIPHENOXYLATE HCL/ATROPINE 2.5-.025MG
1 TABLET ORAL 4 TIMES DAILY PRN
Qty: 20 TABLET | Refills: 0 | Status: SHIPPED | OUTPATIENT
Start: 2021-04-05 | End: 2021-04-28

## 2021-04-10 ENCOUNTER — HOSPITAL ENCOUNTER (OUTPATIENT)
Dept: ULTRASOUND IMAGING | Facility: CLINIC | Age: 66
Discharge: HOME | End: 2021-04-10
Attending: NURSE PRACTITIONER | Admitting: NURSE PRACTITIONER
Payer: COMMERCIAL

## 2021-04-10 DIAGNOSIS — R93.5 ABNORMAL CT OF THE ABDOMEN: ICD-10-CM

## 2021-04-10 PROCEDURE — 76700 US EXAM ABDOM COMPLETE: CPT

## 2021-04-14 DIAGNOSIS — R93.89 ABNORMAL ULTRASOUND: Primary | ICD-10-CM

## 2021-04-19 DIAGNOSIS — Z11.59 ENCOUNTER FOR SCREENING FOR OTHER VIRAL DISEASES: ICD-10-CM

## 2021-04-27 DIAGNOSIS — Z11.59 ENCOUNTER FOR SCREENING FOR OTHER VIRAL DISEASES: ICD-10-CM

## 2021-04-27 LAB
SARS-COV-2 RNA RESP QL NAA+PROBE: NORMAL
SPECIMEN SOURCE: NORMAL

## 2021-04-27 PROCEDURE — U0003 INFECTIOUS AGENT DETECTION BY NUCLEIC ACID (DNA OR RNA); SEVERE ACUTE RESPIRATORY SYNDROME CORONAVIRUS 2 (SARS-COV-2) (CORONAVIRUS DISEASE [COVID-19]), AMPLIFIED PROBE TECHNIQUE, MAKING USE OF HIGH THROUGHPUT TECHNOLOGIES AS DESCRIBED BY CMS-2020-01-R: HCPCS | Performed by: SURGERY

## 2021-04-27 PROCEDURE — U0005 INFEC AGEN DETEC AMPLI PROBE: HCPCS | Performed by: SURGERY

## 2021-04-28 ENCOUNTER — ANESTHESIA EVENT (OUTPATIENT)
Dept: GASTROENTEROLOGY | Facility: CLINIC | Age: 66
End: 2021-04-28
Payer: COMMERCIAL

## 2021-04-28 LAB
LABORATORY COMMENT REPORT: NORMAL
SARS-COV-2 RNA RESP QL NAA+PROBE: NEGATIVE
SPECIMEN SOURCE: NORMAL
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
## 2021-04-30 ENCOUNTER — ANESTHESIA (OUTPATIENT)
Dept: GASTROENTEROLOGY | Facility: CLINIC | Age: 66
End: 2021-04-30
Payer: COMMERCIAL

## 2021-04-30 ENCOUNTER — HOSPITAL ENCOUNTER (OUTPATIENT)
Facility: CLINIC | Age: 66
Discharge: HOME | End: 2021-04-30
Attending: SURGERY | Admitting: SURGERY
Payer: COMMERCIAL

## 2021-04-30 VITALS
BODY MASS INDEX: 27.16 KG/M2 | WEIGHT: 169 LBS | RESPIRATION RATE: 14 BRPM | HEIGHT: 66 IN | TEMPERATURE: 97.6 F | HEART RATE: 59 BPM | OXYGEN SATURATION: 100 % | SYSTOLIC BLOOD PRESSURE: 127 MMHG | DIASTOLIC BLOOD PRESSURE: 67 MMHG

## 2021-04-30 LAB — COLONOSCOPY: NORMAL

## 2021-04-30 PROCEDURE — 88305 TISSUE EXAM BY PATHOLOGIST: CPT | Mod: TC | Performed by: SURGERY

## 2021-04-30 PROCEDURE — 45385 COLONOSCOPY W/LESION REMOVAL: CPT | Performed by: SURGERY

## 2021-04-30 PROCEDURE — 250N000011 HC RX IP 250 OP 636: Performed by: NURSE ANESTHETIST, CERTIFIED REGISTERED

## 2021-04-30 PROCEDURE — 258N000003 HC RX IP 258 OP 636: Performed by: SURGERY

## 2021-04-30 PROCEDURE — 250N000009 HC RX 250: Performed by: NURSE ANESTHETIST, CERTIFIED REGISTERED

## 2021-04-30 PROCEDURE — 45381 COLONOSCOPY SUBMUCOUS NJX: CPT | Mod: 51 | Performed by: SURGERY

## 2021-04-30 PROCEDURE — 370N000017 HC ANESTHESIA TECHNICAL FEE, PER MIN: Performed by: SURGERY

## 2021-04-30 PROCEDURE — 88305 TISSUE EXAM BY PATHOLOGIST: CPT | Mod: 26 | Performed by: PATHOLOGY

## 2021-04-30 PROCEDURE — 250N000009 HC RX 250: Performed by: SURGERY

## 2021-04-30 PROCEDURE — 45380 COLONOSCOPY AND BIOPSY: CPT | Mod: XU | Performed by: SURGERY

## 2021-04-30 RX ORDER — NALOXONE HYDROCHLORIDE 0.4 MG/ML
0.2 INJECTION, SOLUTION INTRAMUSCULAR; INTRAVENOUS; SUBCUTANEOUS
Status: CANCELLED | OUTPATIENT
Start: 2021-04-30

## 2021-04-30 RX ORDER — NALOXONE HYDROCHLORIDE 0.4 MG/ML
0.4 INJECTION, SOLUTION INTRAMUSCULAR; INTRAVENOUS; SUBCUTANEOUS
Status: CANCELLED | OUTPATIENT
Start: 2021-04-30

## 2021-04-30 RX ORDER — PROPOFOL 10 MG/ML
INJECTION, EMULSION INTRAVENOUS CONTINUOUS PRN
Status: DISCONTINUED | OUTPATIENT
Start: 2021-04-30 | End: 2021-04-30

## 2021-04-30 RX ORDER — SODIUM CHLORIDE, SODIUM LACTATE, POTASSIUM CHLORIDE, CALCIUM CHLORIDE 600; 310; 30; 20 MG/100ML; MG/100ML; MG/100ML; MG/100ML
INJECTION, SOLUTION INTRAVENOUS CONTINUOUS
Status: DISCONTINUED | OUTPATIENT
Start: 2021-04-30 | End: 2021-04-30 | Stop reason: HOSPADM

## 2021-04-30 RX ORDER — LIDOCAINE 40 MG/G
CREAM TOPICAL
Status: DISCONTINUED | OUTPATIENT
Start: 2021-04-30 | End: 2021-04-30 | Stop reason: HOSPADM

## 2021-04-30 RX ORDER — ONDANSETRON 2 MG/ML
4 INJECTION INTRAMUSCULAR; INTRAVENOUS
Status: DISCONTINUED | OUTPATIENT
Start: 2021-04-30 | End: 2021-04-30 | Stop reason: HOSPADM

## 2021-04-30 RX ORDER — PROPOFOL 10 MG/ML
INJECTION, EMULSION INTRAVENOUS PRN
Status: DISCONTINUED | OUTPATIENT
Start: 2021-04-30 | End: 2021-04-30

## 2021-04-30 RX ORDER — LIDOCAINE HYDROCHLORIDE 10 MG/ML
INJECTION, SOLUTION EPIDURAL; INFILTRATION; INTRACAUDAL; PERINEURAL PRN
Status: DISCONTINUED | OUTPATIENT
Start: 2021-04-30 | End: 2021-04-30

## 2021-04-30 RX ORDER — FLUMAZENIL 0.1 MG/ML
0.2 INJECTION, SOLUTION INTRAVENOUS
Status: CANCELLED | OUTPATIENT
Start: 2021-04-30 | End: 2021-04-30

## 2021-04-30 RX ADMIN — LIDOCAINE HYDROCHLORIDE 1 ML: 10 INJECTION, SOLUTION EPIDURAL; INFILTRATION; INTRACAUDAL; PERINEURAL at 09:07

## 2021-04-30 RX ADMIN — SODIUM CHLORIDE, POTASSIUM CHLORIDE, SODIUM LACTATE AND CALCIUM CHLORIDE: 600; 310; 30; 20 INJECTION, SOLUTION INTRAVENOUS at 09:07

## 2021-04-30 RX ADMIN — LIDOCAINE HYDROCHLORIDE 40 MG: 10 INJECTION, SOLUTION EPIDURAL; INFILTRATION; INTRACAUDAL; PERINEURAL at 09:52

## 2021-04-30 RX ADMIN — PROPOFOL 200 MCG/KG/MIN: 10 INJECTION, EMULSION INTRAVENOUS at 09:52

## 2021-04-30 RX ADMIN — PROPOFOL 100 MG: 10 INJECTION, EMULSION INTRAVENOUS at 09:52

## 2021-04-30 ASSESSMENT — MIFFLIN-ST. JEOR: SCORE: 1499.33

## 2021-04-30 NOTE — ANESTHESIA CARE TRANSFER NOTE
Patient: Donnie Ernandez    Procedure(s):  COLONOSCOPY, WITH POLYPECTOMY AND BIOPSY  Colonoscopy, Flexible, With Lesion Removal Using Snare    Diagnosis: Diarrhea, unspecified type [R19.7]  Diagnosis Additional Information: No value filed.    Anesthesia Type:   General     Note:    Oropharynx: oropharynx clear of all foreign objects and spontaneously breathing  Level of Consciousness: awake  Oxygen Supplementation: nasal cannula  Level of Supplemental Oxygen (L/min / FiO2): 2  Independent Airway: airway patency satisfactory and stable  Dentition: dentition unchanged  Vital Signs Stable: post-procedure vital signs reviewed and stable  Report to RN Given: handoff report given  Patient transferred to: Phase II    Handoff Report: Identifed the Patient, Identified the Reponsible Provider, Reviewed the pertinent medical history, Discussed the surgical course, Reviewed Intra-OP anesthesia mangement and issues during anesthesia, Set expectations for post-procedure period and Allowed opportunity for questions and acknowledgement of understanding      Vitals: (Last set prior to Anesthesia Care Transfer)  CRNA VITALS  4/30/2021 0945 - 4/30/2021 1015      4/30/2021             Pulse:  72    SpO2:  99 %        Electronically Signed By: Joseph Andrade CRNA, APRN CRNA  April 30, 2021  10:15 AM   verbal cues/1 person assist
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
## 2021-05-03 LAB — COPATH REPORT: NORMAL

## 2021-06-28 DIAGNOSIS — I10 BENIGN ESSENTIAL HYPERTENSION: ICD-10-CM

## 2021-07-02 RX ORDER — METOPROLOL TARTRATE 50 MG
50 TABLET ORAL 2 TIMES DAILY
Qty: 180 TABLET | Refills: 2 | Status: SHIPPED | OUTPATIENT
Start: 2021-07-02 | End: 2022-02-25

## 2021-07-02 NOTE — TELEPHONE ENCOUNTER
Prescription approved per [facility] Refill Protocol.  BP Readings from Last 6 Encounters:   04/30/21 127/67   12/10/20 128/70   12/12/19 138/68   11/13/19 124/68   10/31/19 126/62   10/29/19 (!) 167/80       Mckenzie ERICKSON RN, BSN

## 2021-08-22 DIAGNOSIS — F51.01 PRIMARY INSOMNIA: ICD-10-CM

## 2021-08-23 RX ORDER — ZOLPIDEM TARTRATE 12.5 MG/1
12.5 TABLET, FILM COATED, EXTENDED RELEASE ORAL
Qty: 30 TABLET | Refills: 5 | Status: SHIPPED | OUTPATIENT
Start: 2021-08-23 | End: 2022-02-23

## 2021-08-23 NOTE — TELEPHONE ENCOUNTER
Routing refill request to provider for review/approval because:  Drug not on the [facility] refill protocol     Jenniffer Ha RN'

## 2021-10-04 ENCOUNTER — VIRTUAL VISIT (OUTPATIENT)
Dept: FAMILY MEDICINE | Facility: CLINIC | Age: 66
End: 2021-10-04
Payer: COMMERCIAL

## 2021-10-04 DIAGNOSIS — J06.9 VIRAL URI WITH COUGH: ICD-10-CM

## 2021-10-04 DIAGNOSIS — Z23 ENCOUNTER FOR ADMINISTRATION OF COVID-19 VACCINE: ICD-10-CM

## 2021-10-04 PROCEDURE — 99213 OFFICE O/P EST LOW 20 MIN: CPT | Mod: TEL | Performed by: NURSE PRACTITIONER

## 2021-10-04 NOTE — PATIENT INSTRUCTIONS
"  Patient Education   After Your COVID-19 (Coronavirus) Test  You have been tested for COVID-19 (coronavirus).   If you'll have surgery in the next few days, we'll let you know ahead of time if you have the virus. Please call your surgeon's office with any questions.  For all other patients: Results are usually available in [employer] within 2 to 3 days.   If you do not have a [employer] account, you'll get a letter in the mail in about 7 to 10 days.   [employer] environmenthart is often the fastest way to get test results. Please sign up if you do not already have a [employer] account. See the handout Getting COVID-19 Test Results in [employer] for help.  What if my test result is positive?  If your test is positive and you have not viewed your result in [employer], you'll get a phone call with your result. (A positive test means that you have the virus.)     Follow the tips under \"How do I self-isolate?\" below for 10 days (20 days if you have a weak immune system).    You don't need to be retested for COVID-19 before going back to school or work. As long as you're fever-free and feeling better, you can go back to school, work and other activities after waiting the 10 or 20 days.  What if I have questions after I get my results?  If you have questions about your results, please visit our testing website at [email or web address].   After 7 to 10 days, if you have not gotten your results:     Call [phone] ([phone]) and ask to speak with our COVID-19 results team.    If you're being treated at an infusion center: Call your infusion center directly.  What are the symptoms of COVID-19?  Cough, fever and trouble breathing are the most common signs of COVID-19.  Other symptoms can include new headaches, new muscle or body aches, new and unexplained fatigue (feeling very tired), chills, sore throat, congestion (stuffy or runny nose), diarrhea (loose poop), loss of taste or smell, belly pain, and nausea or vomiting " "(feeling sick to your stomach or throwing up).  You may already have symptoms of COVID-19, or they may show up later.  What should I do if I have symptoms?  If you're having surgery: Call your surgeon's office.  For all other patients: Stay home and away from others (self-isolate) until ...    You've had no fever--and no medicine that reduces fever--for 1 full day (24 hours), AND    Other symptoms have gotten better. For example, your cough or breathing has improved, AND    At least 10 days have passed since your symptoms first started.  How do I self-isolate?    Stay in your own room, even for meals. Use your own bathroom if you can.    Stay away from others in your home. No hugging, kissing or shaking hands. No visitors.    Don't go to work, school or anywhere else.    Clean \"high touch\" surfaces often (doorknobs, counters, handles). Use household cleaning spray or wipes. You'll find a full list of  on the EPA website: www.epa.gov/pesticide-registration/list-n-disinfectants-use-against-sars-cov-2.    Cover your mouth and nose with a mask or other face covering to avoid spreading germs.    Wash your hands and face often. Use soap and water.    Caregivers in these groups are at risk for severe illness due to COVID-19:  ? People 65 years and older  ? People who live in a nursing home or long-term care facility  ? People with chronic disease (lung, heart, cancer, diabetes, kidney, liver, immunologic)  ? People who have a weakened immune system, including those who:    Are in cancer treatment    Take medicine that weakens the immune system, such as corticosteroids    Had a bone marrow or organ transplant    Have an immune deficiency    Have poorly controlled HIV or AIDS    Are obese (body mass index of 40 or higher)    Smoke regularly    Caregivers should wear gloves while washing dishes, handling laundry and cleaning bedrooms and bathrooms.    Use caution when washing and drying laundry: Don't shake dirty " laundry and use the warmest water setting that you can.    For more tips on managing your health at home, go to www.cdc.gov/coronavirus/2019-ncov/downloads/10Things.pdf.  How can I take care of myself at home?  1. Get lots of rest. Drink extra fluids (unless a doctor has told you not to).  2. Take Tylenol (acetaminophen) for fever or pain. If you have liver or kidney problems, ask your family doctor if it's OK to take Tylenol.   Adults can take either:  ? 650 mg (two 325 mg pills) every 4 to 6 hours, or   ? 1,000 mg (two 500 mg pills) every 8 hours as needed.  ? Note: Don't take more than 3,000 mg in one day. Acetaminophen is found in many medicines (both prescribed and over-the-counter medicines). Read all labels to be sure you don't take too much.   For children, check the Tylenol bottle for the right dose. The dose is based on the child's age or weight.  3. If you have other health problems (like cancer, heart failure, an organ transplant or severe kidney disease): Call your specialty clinic if you don't feel better in the next 2 days.  4. Know when to call 911. Emergency warning signs include:  ? Trouble breathing or shortness of breath  ? Chest pain or pressure that doesn't go away  ? Feeling confused like you haven't felt before, or not being able to wake up  ? Bluish-colored lips or face  5. If your doctor prescribed a blood thinner medicine: Follow their instructions.  Where can I get more information?    [facility] - About COVID-19:   [email or web address]    CDC - If You're Sick: cdc.gov/coronavirus/2019-ncov/about/steps-when-sick.html    CDC - Ending Home Isolation: www.cdc.gov/coronavirus/2019-ncov/hcp/disposition-in-home-patients.html    CDC - Caring for Someone: www.cdc.gov/coronavirus/2019-ncov/if-you-are-sick/care-for-someone.html    [facility] - Interim Guidance for Hospital Discharge to Home: www.health.[facility]/diseases/coronavirus/hcp/hospdischarge.pdf    [facility]  clinical trials (COVID-19 research studies): clinicalaffairs.[facility]-clinical-trials    Below are the COVID-19 hotlines at the [place] Department of Health ([facility]). Interpreters are available.  ? For health questions: Call 594-434-2762 or 1-446.226.1580 (7 a.m. to 7 p.m.)  ? For questions about schools and childcare: Call 343-287-2423 or 1-902.280.8843 (7 a.m. to 7 p.m.)    For informational purposes only. Not to replace the advice of your health care provider. Clinically reviewed by Infection Prevention and the [facility] COVID-19 Clinical Team. Copyright   2020 [facility]. All rights reserved. [employer] 507939 - Rev 11/11/20.

## 2021-10-04 NOTE — PROGRESS NOTES
"Donnie is a 66 year old who is being evaluated via a billable telephone visit.      What phone number would you like to be contacted at? .  How would you like to obtain your AVS? MyChart    Assessment & Plan     Viral URI with cough  NON PRESCRIPTION TREATMENT    Mucinex 600 mg 2 tabs bid  Increase humidity to 30-40% in bedroom at night - vaporizer  Avoid decongestant  Saline nasal spray as needed  Increase fluid intake  Benadryl 25mg 1/2 - 1 hour before bed time  Maintain 8 hr minimum of sleep at night  Robitussin DM cough gels for cough    Encounter for administration of COVID-19 vaccine    - Symptomatic COVID-19 Virus (Coronavirus) by PCR      Ordering of each unique test  7 minutes spent on the date of the encounter doing chart review, history and exam, documentation and further activities per the note       BMI:   Estimated body mass index is 27.28 kg/m  as calculated from the following:    Height as of 4/30/21: 1.676 m (5' 6\").    Weight as of 4/30/21: 76.7 kg (169 lb).        Regular exercise  See Patient Instructions    No follow-ups on file.    MAYANK Cortez CNP  [facility]    Subjective   Donnie is a 66 year old who presents for the following health issues     HPI       Concern for COVID-19  About how many days ago did these symptoms start? 10/1/21  Is this your first visit for this illness? Yes  In the 14 days before your symptoms started, have you had close contact with someone with COVID-19 (Coronavirus)? I do not know.  Do you have a fever or chills? Yes, I felt feverish or had chills  Are you having new or worsening difficulty breathing? No  Do you have new or worsening cough? Yes, it's a dry cough.   Have you had any new or unexplained body aches? No    Have you experienced any of the following NEW symptoms?    Headache: YES-occipital    Sore throat: No    Loss of taste or smell: No    Chest pain: YES- tightness \"like when I have a cold\"    Diarrhea: YES-\"always has " "diarrhea\"    Rash: No  What treatments have you tried? tylneol- helps  Who do you live with? Wife  Are you, or a household member, a healthcare worker or a ? No  Do you live in a nursing home, group home, or shelter? No  Do you have a way to get food/medications if quarantined? Yes, I have a friend or family member who can help me.        Review of Systems   Constitutional, HEENT, cardiovascular, pulmonary, gi and gu systems are negative, except as otherwise noted.      Objective           Vitals:  No vitals were obtained today due to virtual visit.    Physical Exam   healthy, alert and no distress  PSYCH: Alert and oriented times 3; coherent speech, normal   rate and volume, able to articulate logical thoughts, able   to abstract reason, no tangential thoughts, no hallucinations   or delusions  His affect is normal and pleasant  RESP: No cough, no audible wheezing, able to talk in full sentences  Remainder of exam unable to be completed due to telephone visits    No results found for this or any previous visit (from the past 24 hour(s)).        Phone call duration: 7 minutes    "

## 2021-10-05 ENCOUNTER — LAB (OUTPATIENT)
Dept: FAMILY MEDICINE | Facility: CLINIC | Age: 66
End: 2021-10-05
Attending: NURSE PRACTITIONER
Payer: COMMERCIAL

## 2021-10-05 DIAGNOSIS — Z23 ENCOUNTER FOR ADMINISTRATION OF COVID-19 VACCINE: ICD-10-CM

## 2021-10-05 PROCEDURE — U0003 INFECTIOUS AGENT DETECTION BY NUCLEIC ACID (DNA OR RNA); SEVERE ACUTE RESPIRATORY SYNDROME CORONAVIRUS 2 (SARS-COV-2) (CORONAVIRUS DISEASE [COVID-19]), AMPLIFIED PROBE TECHNIQUE, MAKING USE OF HIGH THROUGHPUT TECHNOLOGIES AS DESCRIBED BY CMS-2020-01-R: HCPCS

## 2021-10-05 PROCEDURE — U0005 INFEC AGEN DETEC AMPLI PROBE: HCPCS

## 2021-10-06 ENCOUNTER — TELEPHONE (OUTPATIENT)
Dept: FAMILY MEDICINE | Facility: CLINIC | Age: 66
End: 2021-10-06

## 2021-10-06 LAB — SARS-COV-2 RNA RESP QL NAA+PROBE: NEGATIVE

## 2021-10-06 NOTE — TELEPHONE ENCOUNTER
Coronavirus (COVID-19) Notification     Reason for call  Patient requesting results     Lab Result    Lab test 2019-nCoV rRt-PCR in process        RN Recommendations/Instructions per [facility]  Continue quarantee and following instructions until you receive the results     Please Contact your PCP clinic or return to the Emergency department if your:    Symptoms worsen or other concerning symptom's.     Patient informed that if test for COVID19 is POSITIVE,  you will receive a call typically within 48 hours from the test date (date lab collected).  If NEGATIVE result, you will receive a letter in the mail or [employer].      Luisa Felton LPN

## 2021-10-07 ENCOUNTER — NURSE TRIAGE (OUTPATIENT)
Dept: NURSING | Facility: CLINIC | Age: 66
End: 2021-10-07

## 2021-10-07 NOTE — TELEPHONE ENCOUNTER
Feels achy. Negative for coronavirus. I connected him with scheduling for an appointment and advised urgent care if they can't get him into the clinic. He feels run down and has a stuffy nose.  Nicole Page RN  [facility]       Coronavirus (COVID-19) Notification    Lab Result   Lab test 2019-nCoV rRt-PCR OR SARS-COV-2 PCR    Nasopharyngeal AND/OR Oropharyngeal swab is NEGATIVE for 2019-nCoV RNA [OR] SARS-COV-2 RNA (COVID-19) RNA    Your result was negative. This means that we didn't find the virus that causes COVID-19 in your sample. A test may show negative when you do actually have the virus. This can happen when the virus is in the early stages of infection, before you feel illness symptoms.    If you have symptoms   Stay home and away from others (self-isolate) until you meet ALL of the guidelines below:    You've had no fever--and no medicine that reduces fever--for 1 full day (24 hours). And      Your other symptoms have gotten better. For example, your cough or breathing has improved. And   ; At least 10 days have passed since your symptoms started. (If you've been told by a doctor that you have a weak immune system, wait 20 days.)         During this time:    Stay home. Don't go to work, school or anywhere else.     Stay in your own room, including for meals. Use your own bathroom if you can.    Stay away from others in your home. No hugging, kissing or shaking hands. No visitors.    Clean  high touch  surfaces often (doorknobs, counters, handles, etc.). Use a household cleaning spray or wipes. You can find a full list on the EPA website at www.epa.gov/pesticide-registration/list-n-disinfectants-use-against-sars-cov-2.    Cover your mouth and nose with a mask, tissue or other face covering to avoid spreading germs.    Wash your hands and face often with soap and water.    Going back to work  Check with your employer for any guidelines to follow for going back to work.  You are sent a letter  for your Employer which will serve as formal document notice that you, the employee, tested negative for COVID-19, as of the testing date shown above.    If your symptoms worsen or other concerning symptoms, contact PCP, oncare or consider returning to Emergency Dept.    Where can I get more information?    [facility]: [email or web address]    Coronavirus Basics: www.health.[facility].mn.us/diseases/coronavirus/basics.html    [facility] Hotline ([phone])    Nicole Page RN  Feels run down, sniffy nose.    Reason for Disposition    MODERATE weakness (i.e., interferes with work, school, normal activities) and cause unknown (Exceptions: weakness with acute minor illness, or weakness from poor fluid intake)    Additional Information    Negative: Severe difficulty breathing (e.g., struggling for each breath, speaks in single words)     Fatigue started Saturday.    Negative: Shock suspected (e.g., cold/pale/clammy skin, too weak to stand, low BP, rapid pulse)    Negative: Difficult to awaken or acting confused (e.g., disoriented, slurred speech)    Negative: Fainted > 15 minutes ago and still feels too weak or dizzy to stand    Negative: SEVERE weakness (i.e., unable to walk or barely able to walk, requires support) and new onset or worsening    Negative: Sounds like a life-threatening emergency to the triager    Negative: Weakness of the face, arm or leg on one side of the body    Negative: Has diabetes and weakness from low blood sugar (i.e., < 60 mg/dL or 3.5 mmol/L)    Negative: Recent heat exposure, suspected cause of weakness    Negative: Vomiting is the main symptom    Negative: Diarrhea is the main symptom    Negative: Difficulty breathing    Negative: Heart beating < 50 beats per minute OR > 140 beats per minute    Negative: Extra heartbeats OR irregular heart beating (i.e., 'palpitations')    Negative: Follows bleeding (e.g., from vomiting, rectum, vagina) (Exception: small transient weakness from  sight of a small amount blood)    Negative: Bloody, black, or tarry bowel movements (Exception: chronic-unchanged  black-grey bowel movements and is taking iron pills or Pepto-bismol)    Negative: MODERATE weakness from poor fluid intake with no improvement after 2 hours of rest and fluids    Negative: Drinking very little and dehydration suspected (e.g., no urine > 12 hours, very dry mouth, very lightheaded)    Negative: Patient sounds very sick or weak to the triager    Protocols used: WEAKNESS (GENERALIZED) AND FATIGUE-A-OH

## 2021-10-08 ENCOUNTER — ANCILLARY PROCEDURE (OUTPATIENT)
Dept: GENERAL RADIOLOGY | Facility: CLINIC | Age: 66
End: 2021-10-08
Attending: INTERNAL MEDICINE
Payer: COMMERCIAL

## 2021-10-08 ENCOUNTER — OFFICE VISIT (OUTPATIENT)
Dept: FAMILY MEDICINE | Facility: CLINIC | Age: 66
End: 2021-10-08
Payer: COMMERCIAL

## 2021-10-08 VITALS
HEIGHT: 66 IN | BODY MASS INDEX: 26.52 KG/M2 | SYSTOLIC BLOOD PRESSURE: 124 MMHG | OXYGEN SATURATION: 97 % | TEMPERATURE: 99 F | HEART RATE: 60 BPM | DIASTOLIC BLOOD PRESSURE: 60 MMHG | WEIGHT: 165 LBS | RESPIRATION RATE: 20 BRPM

## 2021-10-08 DIAGNOSIS — N18.31 STAGE 3A CHRONIC KIDNEY DISEASE (H): ICD-10-CM

## 2021-10-08 DIAGNOSIS — R68.83 CHILLS (WITHOUT FEVER): ICD-10-CM

## 2021-10-08 DIAGNOSIS — R05.9 COUGH: Primary | ICD-10-CM

## 2021-10-08 DIAGNOSIS — R05.9 COUGH: ICD-10-CM

## 2021-10-08 PROBLEM — N18.30 CHRONIC KIDNEY DISEASE, STAGE 3 (H): Status: ACTIVE | Noted: 2021-10-08

## 2021-10-08 LAB
ALBUMIN SERPL-MCNC: 3.6 G/DL (ref 3.4–5)
ALBUMIN UR-MCNC: NEGATIVE MG/DL
ALP SERPL-CCNC: 79 U/L (ref 40–150)
ALT SERPL W P-5'-P-CCNC: 27 U/L (ref 0–70)
ANION GAP SERPL CALCULATED.3IONS-SCNC: 3 MMOL/L (ref 3–14)
APPEARANCE UR: CLEAR
AST SERPL W P-5'-P-CCNC: 20 U/L (ref 0–45)
B BURGDOR IGG+IGM SER QL: 0.44
BASOPHILS # BLD AUTO: 0.1 10E3/UL (ref 0–0.2)
BASOPHILS NFR BLD AUTO: 1 %
BILIRUB SERPL-MCNC: 0.4 MG/DL (ref 0.2–1.3)
BILIRUB UR QL STRIP: NEGATIVE
BUN SERPL-MCNC: 22 MG/DL (ref 7–30)
CALCIUM SERPL-MCNC: 8.4 MG/DL (ref 8.5–10.1)
CHLORIDE BLD-SCNC: 110 MMOL/L (ref 94–109)
CO2 SERPL-SCNC: 25 MMOL/L (ref 20–32)
COLOR UR AUTO: YELLOW
CREAT SERPL-MCNC: 1.29 MG/DL (ref 0.66–1.25)
EOSINOPHIL # BLD AUTO: 0.3 10E3/UL (ref 0–0.7)
EOSINOPHIL NFR BLD AUTO: 3 %
ERYTHROCYTE [DISTWIDTH] IN BLOOD BY AUTOMATED COUNT: 12.7 % (ref 10–15)
FLUAV RNA SPEC QL NAA+PROBE: NEGATIVE
FLUBV RNA RESP QL NAA+PROBE: NEGATIVE
GFR SERPL CREATININE-BSD FRML MDRD: 58 ML/MIN/1.73M2
GLUCOSE BLD-MCNC: 91 MG/DL (ref 70–99)
GLUCOSE UR STRIP-MCNC: NEGATIVE MG/DL
HCT VFR BLD AUTO: 37 % (ref 40–53)
HGB BLD-MCNC: 12.4 G/DL (ref 13.3–17.7)
HGB UR QL STRIP: NEGATIVE
KETONES UR STRIP-MCNC: NEGATIVE MG/DL
LEUKOCYTE ESTERASE UR QL STRIP: NEGATIVE
LYMPHOCYTES # BLD AUTO: 2.4 10E3/UL (ref 0.8–5.3)
LYMPHOCYTES NFR BLD AUTO: 27 %
MCH RBC QN AUTO: 32.4 PG (ref 26.5–33)
MCHC RBC AUTO-ENTMCNC: 33.5 G/DL (ref 31.5–36.5)
MCV RBC AUTO: 97 FL (ref 78–100)
MONOCYTES # BLD AUTO: 0.8 10E3/UL (ref 0–1.3)
MONOCYTES NFR BLD AUTO: 9 %
NEUTROPHILS # BLD AUTO: 5.3 10E3/UL (ref 1.6–8.3)
NEUTROPHILS NFR BLD AUTO: 60 %
NITRATE UR QL: NEGATIVE
PH UR STRIP: 5 [PH] (ref 5–7)
PLATELET # BLD AUTO: 430 10E3/UL (ref 150–450)
POTASSIUM BLD-SCNC: 4.9 MMOL/L (ref 3.4–5.3)
PROT SERPL-MCNC: 7.9 G/DL (ref 6.8–8.8)
RBC # BLD AUTO: 3.83 10E6/UL (ref 4.4–5.9)
RSV RNA SPEC NAA+PROBE: NEGATIVE
SODIUM SERPL-SCNC: 138 MMOL/L (ref 133–144)
SP GR UR STRIP: >=1.03 (ref 1–1.03)
UROBILINOGEN UR STRIP-ACNC: 0.2 E.U./DL
WBC # BLD AUTO: 8.9 10E3/UL (ref 4–11)

## 2021-10-08 PROCEDURE — 71046 X-RAY EXAM CHEST 2 VIEWS: CPT | Performed by: RADIOLOGY

## 2021-10-08 PROCEDURE — 80053 COMPREHEN METABOLIC PANEL: CPT | Performed by: INTERNAL MEDICINE

## 2021-10-08 PROCEDURE — 86618 LYME DISEASE ANTIBODY: CPT | Performed by: INTERNAL MEDICINE

## 2021-10-08 PROCEDURE — 99214 OFFICE O/P EST MOD 30 MIN: CPT | Performed by: INTERNAL MEDICINE

## 2021-10-08 PROCEDURE — 87631 RESP VIRUS 3-5 TARGETS: CPT | Performed by: INTERNAL MEDICINE

## 2021-10-08 PROCEDURE — 85025 COMPLETE CBC W/AUTO DIFF WBC: CPT | Performed by: INTERNAL MEDICINE

## 2021-10-08 PROCEDURE — 36415 COLL VENOUS BLD VENIPUNCTURE: CPT | Performed by: INTERNAL MEDICINE

## 2021-10-08 PROCEDURE — 81003 URINALYSIS AUTO W/O SCOPE: CPT | Performed by: INTERNAL MEDICINE

## 2021-10-08 ASSESSMENT — MIFFLIN-ST. JEOR: SCORE: 1476.19

## 2021-10-08 NOTE — LETTER
October 11, 2021      Donnie Ernandez  [address] [place] [address] [place] [address]        Dear ,    We are writing to inform you of your test results.    Influenza, RSV, Lyme tests are negative    Resulted Orders   Lyme Disease [place] with reflex to WB Serum   Result Value Ref Range    Lyme Disease Antibodies Total 0.44 <0.90      Comment:      Negative, Absence of detectable Borrelia burdorferi antibodies. A negative result does not exclude the possibility of Borrelia burgdorferi infection. If early Lyme disease is suspected, a second sample should be collected and tested 2 to 4 weeks later.   Comprehensive metabolic panel (BMP + Alb, Alk Phos, ALT, AST, Total. Bili, TP)   Result Value Ref Range    Sodium 138 133 - 144 mmol/L    Potassium 4.9 3.4 - 5.3 mmol/L    Chloride 110 (H) 94 - 109 mmol/L    Carbon Dioxide (CO2) 25 20 - 32 mmol/L    Anion Gap 3 3 - 14 mmol/L    Urea Nitrogen 22 7 - 30 mg/dL    Creatinine 1.29 (H) 0.66 - 1.25 mg/dL    Calcium 8.4 (L) 8.5 - 10.1 mg/dL    Glucose 91 70 - 99 mg/dL    Alkaline Phosphatase 79 40 - 150 U/L    AST 20 0 - 45 U/L    ALT 27 0 - 70 U/L    Protein Total 7.9 6.8 - 8.8 g/dL    Albumin 3.6 3.4 - 5.0 g/dL    Bilirubin Total 0.4 0.2 - 1.3 mg/dL    GFR Estimate 58 (L) >60 mL/min/1.73m2      Comment:      As of July 11, 2021, eGFR is calculated by the CKD-EPI creatinine equation, without race adjustment. eGFR can be influenced by muscle mass, exercise, and diet. The reported eGFR is an estimation only and is only applicable if the renal function is stable.   UA Macro with Reflex to Micro and Culture - lab collect   Result Value Ref Range    Color Urine Yellow Colorless, Straw, Light Yellow, Yellow    Appearance Urine Clear Clear    Glucose Urine Negative Negative mg/dL    Bilirubin Urine Negative Negative    Ketones Urine Negative Negative mg/dL    Specific Gravity Urine >=1.030 1.003 - 1.035    Blood Urine Negative Negative    pH Urine 5.0 5.0 - 7.0    Protein Albumin Urine  Negative Negative mg/dL    Urobilinogen Urine 0.2 0.2, 1.0 E.U./dL    Nitrite Urine Negative Negative    Leukocyte Esterase Urine Negative Negative    Narrative    Microscopic not indicated   CBC with platelets and differential   Result Value Ref Range    WBC Count 8.9 4.0 - 11.0 10e3/uL    RBC Count 3.83 (L) 4.40 - 5.90 10e6/uL    Hemoglobin 12.4 (L) 13.3 - 17.7 g/dL    Hematocrit 37.0 (L) 40.0 - 53.0 %    MCV 97 78 - 100 fL    MCH 32.4 26.5 - 33.0 pg    MCHC 33.5 31.5 - 36.5 g/dL    RDW 12.7 10.0 - 15.0 %    Platelet Count 430 150 - 450 10e3/uL    % Neutrophils 60 %    % Lymphocytes 27 %    % Monocytes 9 %    % Eosinophils 3 %    % Basophils 1 %    Absolute Neutrophils 5.3 1.6 - 8.3 10e3/uL    Absolute Lymphocytes 2.4 0.8 - 5.3 10e3/uL    Absolute Monocytes 0.8 0.0 - 1.3 10e3/uL    Absolute Eosinophils 0.3 0.0 - 0.7 10e3/uL    Absolute Basophils 0.1 0.0 - 0.2 10e3/uL   Influenza A and B and RSV PCR   Result Value Ref Range    Influenza A target Negative Negative    Influenza B target Negative Negative    RSV target Negative Negative    Narrative    The [employer] Xpert  Xpress Flu/RSV Assay, performed on the [employer], is an automated, multiplex real-time, reverse transcriptase polymerase chain reaction (RT-PCR) assay intended for the in vitro qualitative detection and differentiation of influenza A, influenza B, and respiratory syncytial virus (RSV) viral RNA. The Xpert Xpress Flu/RSV Assay uses nasopharyngeal swab and nasal swab specimens collected from patients with signs and symptoms of respiratory infection. The Xpert Xpress Flu/RSV Assay is intended as an aid in the diagnosis of influenza and respiratory syncytial virus infections in conjunction with clinical and epidemiological risk factors.   Negative results do not preclude influenza virus or RSV infection and should not be used as the sole basis for treatment or other patient management decisions.       If you have any questions or  concerns, please call the clinic at the number listed above.       Sincerely,      Jacquelyn Manzanares, DO

## 2021-10-08 NOTE — RESULT ENCOUNTER NOTE
Please call patient.  All labs are normal, no clear cause for illness is found.  Flu, lyme test still pending.

## 2021-10-08 NOTE — PROGRESS NOTES
"    Assessment & Plan     Cough - viral vs false negative COVID vs other infection.  Exam non-localizing.  Follow-up on 10/12 unless testing reveals diagnosis  - Comprehensive metabolic panel (BMP + Alb, Alk Phos, ALT, AST, Total. Bili, TP); Future  - CBC with platelets and differential; Future  - Lyme Disease [place] with reflex to WB Serum; Future  - Influenza A & B Antigen - Clinic Collect  - XR Chest 2 Views; Future  - UA Macro with Reflex to Micro and Culture - lab collect; Future    Chills (without fever)  - Comprehensive metabolic panel (BMP + Alb, Alk Phos, ALT, AST, Total. Bili, TP); Future  - CBC with platelets and differential; Future  - Lyme Disease [place] with reflex to WB Serum; Future  - Influenza A & B Antigen - Clinic Collect  - XR Chest 2 Views; Future  - UA Macro with Reflex to Micro and Culture - lab collect; Future    CKD-3a.  Known issue that I take into account for their medical decisions, no current exacerbations or new concerns             BMI:   Estimated body mass index is 26.63 kg/m  as calculated from the following:    Height as of this encounter: 1.676 m (5' 6\").    Weight as of this encounter: 74.8 kg (165 lb).       Patient Instructions         Thank you for choosing [facility].  You may be receiving an email and/or telephone survey request from [facility] [employer] regarding your visit today.  Please take a few minutes to respond to the survey to let us know how we are doing.      If you have questions or concerns, please contact us via [employer] or you can contact your care team at [phone] option [phone].    Our Clinic hours are:  Monday - Thursday 7am-6pm  Friday 7am-5pm    The [place] outpatient lab hours are:  Monday - Friday 7am-4:30pm    Appointments are required, call [phone]    If you have clinical questions after hours or would like to schedule an appointment,  call the clinic at [phone].        1. Blood work, xray urine test today  2. If testing is normal, " then may be a virus that has to run its course  3. Follow-up on Monday or Tuesday if not feeling better      No follow-ups on file.    Jacquelyn Manzanares DO  [facility]    Russ Fields is a 66 year old who presents for the following health issues  accompanied by himself:    HPI     Acute Illness  Covid testing was done on 10-5-21 and that was negative. Symptoms started on 10/2  Acute illness concerns: Fatigue and weakness  Onset/Duration: 1 week  Symptoms:  Fever: no  Chills/Sweats: YES- Chills, sweating at night sometimes.  Has noticed his face looks flushed.  Headache (location?): YES- for the back of the head.  Sinus Pressure: No, has been having issues with a tooth.  Has been trying to get an appointment for that but has had to wait due to not feeling and having the Covid testing done.  Conjunctivitis:  no  Ear Pain: no  Rhinorrhea: YES- Slight.  Congestion: YES- The chest feels tight.  Sore Throat: Throat feels phlegmy, a little sore.  Cough: YES - Somewhat of a dry cough at times.  Wheeze: no  Decreased Appetite: YES- decrease with appetite.  Has been able to drink fluids.  Nausea: no  Vomiting: no  Diarrhea: YES- States he always has diarrhea.  Feels it may be a little worse.  Dysuria/Freq.: no  Dysuria or Hematuria: no  Fatigue/Achiness: YES- Fatigue, achy and weakness.  Sick/Strep Exposure: Co-workers have been ill.  Son-in-law was ill-not Covid.  Therapies tried and outcome: Tylenol as needed-helping with the headache some.  --having chills (not rigors), fatigue, mild sore throat/stuffy nose, post-nasal drip, mild occipital headache, mild dry cough, chest tightness,  --is checking temp, no fever ever  --no shortness of breath, abdominal, N/V/C, urinary changes, skin changes  --has chronic unchanged diarrhea      LETTER: Patient is needing a letter for work as he has been out ill all this week.         Review of Systems   Constitutional, HEENT, cardiovascular, pulmonary, GI, ,  "musculoskeletal, neuro, skin, endocrine and psych systems are negative, except as otherwise noted.      Objective    /60   Pulse 60   Temp 99  F (37.2  C) (Tympanic)   Resp 20   Ht 1.676 m (5' 6\")   Wt 74.8 kg (165 lb)   SpO2 97%   BMI 26.63 kg/m    Body mass index is 26.63 kg/m .  Physical Exam   GENERAL APPEARANCE: alert, no distress, fatigued and mildly ill appearing.  Skin is warm  EYES: Eyes grossly normal to inspection, PERRL and conjunctivae and sclerae normal  HENT: ear canals and TM's normal and nose and mouth without ulcers or lesions  NECK: no adenopathy, no asymmetry, masses, or scars and thyroid normal to palpation  RESP: faint rhonchi in LLL and RUL that clear upon 2nd auscultation  CV: regular rates and rhythm, normal S1 S2, no S3 or S4 and no murmur, click or rub    Results for orders placed or performed in visit on 10/08/21 (from the past 24 hour(s))   CBC with platelets and differential    Narrative    The following orders were created for panel order CBC with platelets and differential.  Procedure                               Abnormality         Status                     ---------                               -----------         ------                     CBC with platelets and d...[[id number]]  Abnormal            Final result                 Please view results for these tests on the individual orders.   Comprehensive metabolic panel (BMP + Alb, Alk Phos, ALT, AST, Total. Bili, TP)   Result Value Ref Range    Sodium 138 133 - 144 mmol/L    Potassium 4.9 3.4 - 5.3 mmol/L    Chloride 110 (H) 94 - 109 mmol/L    Carbon Dioxide (CO2) 25 20 - 32 mmol/L    Anion Gap 3 3 - 14 mmol/L    Urea Nitrogen 22 7 - 30 mg/dL    Creatinine 1.29 (H) 0.66 - 1.25 mg/dL    Calcium 8.4 (L) 8.5 - 10.1 mg/dL    Glucose 91 70 - 99 mg/dL    Alkaline Phosphatase 79 40 - 150 U/L    AST 20 0 - 45 U/L    ALT 27 0 - 70 U/L    Protein Total 7.9 6.8 - 8.8 g/dL    Albumin 3.6 3.4 - 5.0 g/dL    Bilirubin Total 0.4 " 0.2 - 1.3 mg/dL    GFR Estimate 58 (L) >60 mL/min/1.73m2   UA Macro with Reflex to Micro and Culture - lab collect    Specimen: Urine, Clean Catch   Result Value Ref Range    Color Urine Yellow Colorless, Straw, Light Yellow, Yellow    Appearance Urine Clear Clear    Glucose Urine Negative Negative mg/dL    Bilirubin Urine Negative Negative    Ketones Urine Negative Negative mg/dL    Specific Gravity Urine >=1.030 1.003 - 1.035    Blood Urine Negative Negative    pH Urine 5.0 5.0 - 7.0    Protein Albumin Urine Negative Negative mg/dL    Urobilinogen Urine 0.2 0.2, 1.0 E.U./dL    Nitrite Urine Negative Negative    Leukocyte Esterase Urine Negative Negative    Narrative    Microscopic not indicated   CBC with platelets and differential   Result Value Ref Range    WBC Count 8.9 4.0 - 11.0 10e3/uL    RBC Count 3.83 (L) 4.40 - 5.90 10e6/uL    Hemoglobin 12.4 (L) 13.3 - 17.7 g/dL    Hematocrit 37.0 (L) 40.0 - 53.0 %    MCV 97 78 - 100 fL    MCH 32.4 26.5 - 33.0 pg    MCHC 33.5 31.5 - 36.5 g/dL    RDW 12.7 10.0 - 15.0 %    Platelet Count 430 150 - 450 10e3/uL    % Neutrophils 60 %    % Lymphocytes 27 %    % Monocytes 9 %    % Eosinophils 3 %    % Basophils 1 %    Absolute Neutrophils 5.3 1.6 - 8.3 10e3/uL    Absolute Lymphocytes 2.4 0.8 - 5.3 10e3/uL    Absolute Monocytes 0.8 0.0 - 1.3 10e3/uL    Absolute Eosinophils 0.3 0.0 - 0.7 10e3/uL    Absolute Basophils 0.1 0.0 - 0.2 10e3/uL

## 2021-10-08 NOTE — LETTER
October 8, 2021      Donnie Ernandez  [address] [place] [address]  [place] [address]        To Whom It May Concern:    Donnie Ernandez was seen in our clinic. He should stay home from work due to illness.    He tested negative for COVID on 10/5/21.  He can return to work on 10/13/21 or sooner if he is feeling better.      Sincerely,        Jacquelyn Manzanares, DO

## 2021-10-12 ENCOUNTER — OFFICE VISIT (OUTPATIENT)
Dept: FAMILY MEDICINE | Facility: CLINIC | Age: 66
End: 2021-10-12
Payer: COMMERCIAL

## 2021-10-12 VITALS
OXYGEN SATURATION: 98 % | WEIGHT: 164 LBS | HEIGHT: 66 IN | DIASTOLIC BLOOD PRESSURE: 62 MMHG | TEMPERATURE: 97.7 F | SYSTOLIC BLOOD PRESSURE: 118 MMHG | BODY MASS INDEX: 26.36 KG/M2 | HEART RATE: 68 BPM

## 2021-10-12 DIAGNOSIS — J01.01 ACUTE RECURRENT MAXILLARY SINUSITIS: Primary | ICD-10-CM

## 2021-10-12 PROCEDURE — 99213 OFFICE O/P EST LOW 20 MIN: CPT | Performed by: INTERNAL MEDICINE

## 2021-10-12 RX ORDER — DOXYCYCLINE 100 MG/1
100 CAPSULE ORAL 2 TIMES DAILY
Qty: 14 CAPSULE | Refills: 0 | Status: SHIPPED | OUTPATIENT
Start: 2021-10-12 | End: 2021-10-21

## 2021-10-12 ASSESSMENT — MIFFLIN-ST. JEOR: SCORE: 1471.65

## 2021-10-12 NOTE — LETTER
October 12, 2021      Donnie Ernandez  [address] [place] [id number]        To Whom It May Concern:    Donnie Ernandez was seen in our clinic. He may return to work 10/13/2021 without restrictions.        Sincerely,        Jacquelyn Manzanares, DO

## 2021-10-12 NOTE — PATIENT INSTRUCTIONS
1. Letter for work  2. Start doxycycline 2x day for 1 weeks.  3. Follow-up if symptoms not improving  Patient Education     Sinusitis (Antibiotic Treatment)    The sinuses are air-filled spaces within the bones of the face. They connect to the inside of the nose. Sinusitis is an inflammation of the tissue that lines the sinuses. Sinusitis can occur during a cold. It can also happen due to allergies to pollens and other particles in the air. Sinusitis can cause symptoms of sinus congestion and a feeling of fullness. A sinus infection causes fever, headache, and facial pain. There is often green or yellow fluid draining from the nose or into the back of the throat (post-nasal drip). You have been given antibiotics to treat this condition.   Home care    Take the full course of antibiotics as instructed. Don't stop taking them, even when you feel better.    Drink plenty of water, hot tea, and other liquids as directed by the healthcare provider. This may help thin nasal mucus. It also may help your sinuses drain fluids.    Heat may help soothe painful areas of your face. Use a towel soaked in hot water. Or,  the shower and direct the warm spray onto your face. Using a vaporizer along with a menthol rub at night may also help soothe symptoms.     An expectorant with guaifenesin may help thin nasal mucus and help your sinuses drain fluids. Talk with your provider or pharmacists before taking an over-the-counter (OTC) medicine if you have any questions about it or its side effects..    You can use an OTC decongestant, unless a similar medicine was prescribed to you. Nasal sprays work the fastest. Use one that contains phenylephrine or oxymetazoline. First blow your nose gently. Then use the spray. Don't use these medicines more often than directed on the label. If you do, your symptoms may get worse. You may also take pills that contain pseudoephedrine. Don t use products that combine multiple medicines. This is  because side effects may be increased. Read labels. You can also ask the pharmacist for help. (People with high blood pressure should not use decongestants. They can raise blood pressure.) Talk with your provider or pharmacist if you have any questions about the medicine..    OTC antihistamines may help if allergies contributed to your sinusitis. Talk with your provider or pharmacist if you have any questions about the medicine..    Don't use nasal rinses or irrigation during an acute sinus infection, unless your healthcare provider tells you to. Rinsing may spread the infection to other areas in your sinuses.    Use acetaminophen or ibuprofen to control pain, unless another pain medicine was prescribed to you. If you have chronic liver or kidney disease or ever had a stomach ulcer, talk with your healthcare provider before using these medicines. Never give aspirin to anyone under age 18 who is ill with a fever. It may cause severe liver damage.    Don't smoke. This can make symptoms worse.    Follow-up care  Follow up with your healthcare provider, or as advised.   When to seek medical advice  Call your healthcare provider if any of these occur:     Facial pain or headache that gets worse    Stiff neck    Unusual drowsiness or confusion    Swelling of your forehead or eyelids    Symptoms don't go away in 10 days    Vision problems, such as blurred or double vision    Fever of 100.4 F (38 C) or higher, or as directed by your healthcare provider  Call 911  Call 911 if any of these occur:     Seizure    Trouble breathing    Feeling dizzy or faint    Fingernails, skin or lips look blue, purple , or gray  Prevention  Here are steps you can take to help prevent an infection:     Keep good hand washing habits.    Don t have close contact with people who have sore throats, colds, or other upper respiratory infections.    Don t smoke, and stay away from secondhand smoke.    Stay up to date with of your vaccines.  [employer]Well  last reviewed this educational content on 12/1/2019 2000-2021 [employer]. All rights reserved. This information is not intended as a substitute for professional medical care. Always follow your healthcare professional's instructions.

## 2021-10-12 NOTE — PROGRESS NOTES
"    Assessment & Plan   Problem List Items Addressed This Visit     None      Visit Diagnoses     Acute recurrent maxillary sinusitis    -  Primary    Relevant Medications    doxycycline hyclate (VIBRAMYCIN) 100 MG capsule                    BMI:   Estimated body mass index is 26.47 kg/m  as calculated from the following:    Height as of this encounter: 1.676 m (5' 6\").    Weight as of this encounter: 74.4 kg (164 lb).           No follow-ups on file.    Jacquelyn Manzanares DO  [facility]    Russ Fields is a 66 year old who presents for the following health issues:     HPI     He was seen on 10/8 for fatigue, weakness, chills, sweating, runny nose, chest tightness, myalgias, mild sore throat/rhinorrhea, headache.  Exam was normal, testing for flu, RSV, Lyme, Covid, chest x-ray was all negative  --today he reports new bilateral maxillary sinus pain/pressure, ear congestion 'like there is water'  --no longer has sore throat,myalgias, chills, sweating, cough,  Chest tightness  --ongoing fatigue, weakness,  intermittent headache,  --overall he is feeling about the same - not worse, not much better        Review of Systems   Constitutional, HEENT, cardiovascular, pulmonary, gi and gu systems are negative, except as otherwise noted.      Objective    /62 (BP Location: Right arm, Patient Position: Chair, Cuff Size: Adult Regular)   Pulse 68   Temp 97.7  F (36.5  C) (Tympanic)   Ht 1.676 m (5' 6\")   Wt 74.4 kg (164 lb)   SpO2 98%   BMI 26.47 kg/m    Body mass index is 26.47 kg/m .  Physical Exam   GENERAL APPEARANCE: alert, no distress and fatigued  EYES: Eyes grossly normal to inspection, PERRL and conjunctivae and sclerae normal  HENT: ear canals and TM's normal, nose and mouth without ulcers or lesions and maxillary sinus tenderness bilateral  NECK: no adenopathy, no asymmetry, masses, or scars and thyroid normal to palpation  RESP: lungs clear to auscultation - no rales, rhonchi " or wheezes  CV: regular rates and rhythm, normal S1 S2, no S3 or S4 and no murmur, click or rub

## 2021-10-15 ENCOUNTER — TELEPHONE (OUTPATIENT)
Dept: FAMILY MEDICINE | Facility: CLINIC | Age: 66
End: 2021-10-15

## 2021-10-15 NOTE — TELEPHONE ENCOUNTER
Pt states doxycycline giving him diarrhea and nausea, looking for alternative.    Katie Baires, ESPERANZA Linares

## 2021-10-19 ENCOUNTER — TELEPHONE (OUTPATIENT)
Dept: FAMILY MEDICINE | Facility: CLINIC | Age: 66
End: 2021-10-19

## 2021-10-19 DIAGNOSIS — J01.01 ACUTE RECURRENT MAXILLARY SINUSITIS: Primary | ICD-10-CM

## 2021-10-19 NOTE — TELEPHONE ENCOUNTER
Reason for Call:  Other     Detailed comments: Patient judith stated he was prescribed with antibiotic last Oct 12th and stop taking it Oct 14th because he felt sick. He developed diarrhea, stomach cramps and nauseated. H e is asking for recommendation.    Phone Number Patient can be reached at: Cell number on file:    Telephone Information:   Mobile [phone]       Best Time: any    Can we leave a detailed message on this number? YES    Call taken on 10/19/2021 at 4:01 PM by Gifty Agosto

## 2021-10-21 RX ORDER — CEFUROXIME AXETIL 500 MG/1
500 TABLET ORAL 2 TIMES DAILY
Qty: 14 TABLET | Refills: 0 | Status: SHIPPED | OUTPATIENT
Start: 2021-10-21 | End: 2021-10-28

## 2021-10-21 NOTE — TELEPHONE ENCOUNTER
Called patient, no answer. Unable to confirm patient identity on generic message recording, non-detailed message left to return call to clinic.    Lainey Mcneill RN  [facility]

## 2021-10-21 NOTE — TELEPHONE ENCOUNTER
"See notes below, writer spoke with patient. He reported that he started the doxycycline on 10/12/21 for sinusitis, and stopped taking the med on 10/15/21 d/t c/o diarrhea, nausea, and stomach cramps. Patient reported that he does have loose stools at baseline, but they were worse when on the antibiotic and \"mucousy\" at times. Reported that taking with or without food didn't seem to matter. Patient does report that his nasal symptoms have improved somewhat; reported that he does still have nasal congestion, and feeling like he has water in his ears and that his ears are \"itchy\". Denies fever, chills, aches, or other symptoms. Please advise, repeat office visit for follow up?    Lainey Mcneill RN  [facility]  "

## 2021-10-21 NOTE — TELEPHONE ENCOUNTER
Patient is calling to follow up questions regarding his antibiotic. Dr Manzanares is not in until next Tuesday. Patient is asking if different provider can answer his question and can give recommendation.    Gifty Agosto on 10/21/2021 at 3:47 PM

## 2021-10-21 NOTE — TELEPHONE ENCOUNTER
Covering for PCP (out of clinic today):  We can try a course of Ceftin, prescription sent.    Thanks,  Galileo Green MD

## 2021-10-22 NOTE — TELEPHONE ENCOUNTER
Patient notified of Dr. Green's recommendations, and verbalized understanding. Should we add doxycycline to his allergies/adverse reaction list d/t GI upset?    Lainey Mcneill RN  [facility]

## 2021-10-22 NOTE — TELEPHONE ENCOUNTER
Another message was received on 10/19 and Dr Green addressed.    Closing this encounter.  Marva Degroot RN

## 2021-11-18 ENCOUNTER — VIRTUAL VISIT (OUTPATIENT)
Dept: FAMILY MEDICINE | Facility: CLINIC | Age: 66
End: 2021-11-18
Payer: COMMERCIAL

## 2021-11-18 DIAGNOSIS — R05.9 COUGH: ICD-10-CM

## 2021-11-18 DIAGNOSIS — R50.9 FEVER AND CHILLS: ICD-10-CM

## 2021-11-18 DIAGNOSIS — Z11.52 ENCOUNTER FOR SCREENING LABORATORY TESTING FOR COVID-19 VIRUS: Primary | ICD-10-CM

## 2021-11-18 PROCEDURE — 99213 OFFICE O/P EST LOW 20 MIN: CPT | Mod: TEL | Performed by: PHYSICIAN ASSISTANT

## 2021-11-18 NOTE — PROGRESS NOTES
Donnie is a 66 year old who is being evaluated via a billable telephone visit.      What phone number would you like to be contacted at? [phone]  How would you like to obtain your AVS? Mail a copy    Assessment & Plan     Encounter for screening laboratory testing for COVID-19 virus  Fever and chills  Cough  Patient is a 66 YOM who presents to telephone visit due to URI symptoms including cough, nasal congestion, and fever/chills.  No signs of respiratory distress.  Patient is vaccinated against COVID-19.  Symptoms likely due to viral URI.  Will complete COVID-19 PCR screening.  Discussed management with rest, hydration, Tylenol, Mucinex/Robitussin if needed.  Discussed symptoms warrant urgent/emergent follow-up as well as return precautions.  - Symptomatic COVID-19 Virus (Coronavirus) by PCR Nose; Future    See Patient Instructions    Return in about 1 week (around 11/25/2021), or if symptoms worsen or fail to improve.    Maribell Naranjo PA-C  [facility] is a 66 year old who presents for the following health issues    HPI       Concern for COVID-19  About how many days ago did these symptoms start? 2 days of symptoms including nasal congestion, fever/chills and cough. Possible contacts with COVID19 at work. Patient is vaccinated against COVID19.   Is this your first visit for this illness? Yes  In the 14 days before your symptoms started, have you had close contact with someone with COVID-19 (Coronavirus)? No  Do you have a fever or chills? Yes, I felt feverish or had chills  Are you having new or worsening difficulty breathing? No  Do you have new or worsening cough? No  Have you had any new or unexplained body aches? No    Have you experienced any of the following NEW symptoms?    Headache: YES    Sore throat: No    Loss of taste or smell: No    Chest pain: No    Diarrhea: Yes, always had this    Rash: No  What treatments have you tried? Nasal spray, Tylenol  Who do you  live with? wife  Are you, or a household member, a healthcare worker or a ? No  Do you live in a nursing home, group home, or shelter? No  Do you have a way to get food/medications if quarantined? Yes           Objective           Vitals:  No vitals were obtained today due to virtual visit.    Physical Exam   healthy, alert and no distress  PSYCH: Alert and oriented times 3; coherent speech, normal   rate and volume, able to articulate logical thoughts, able   to abstract reason, no tangential thoughts, no hallucinations   or delusions  His affect is normal  RESP: No cough, no audible wheezing, able to talk in full sentences  Remainder of exam unable to be completed due to telephone visits          Phone call duration: 6 minutes

## 2021-11-18 NOTE — LETTER
November 18, 2021      Donnie Ernandez  [address] [place] [address] [place] [address]        To Whom It May Concern:    Donnie Ernandez was seen in our clinic. He may return to work with symptom improvement in negative test results or per CDC guidelines without restrictions.      Sincerely,        Maribell Naranjo PA-C

## 2021-11-18 NOTE — PATIENT INSTRUCTIONS
Your symptoms are concerning for COVID-19 or other viral illness such as the common cold.  A COVID-19 test has been ordered for you.  You will be contacted within 24 hours to schedule your test.  After completing the test, results should be available within 1-2 days and will be sent to your MyChart.  You may use Tylenol for fever and pain management and Robitussin-DM/Mucinex DM for cough and congestion.  Make sure to get plenty of rest and stay hydrated.      If you have severe symptoms such as chest pain, wheezing, shortness of breath, or fevers that you cannot control, please go to the emergency department.  It is important that you follow self-isolation guidelines as listed below.    Please reach out with any questions or concerns.  Take care,  Maribell Naranjo PA-C        Patient Education     Viral Upper Respiratory Illness (Adult)    You have a viral upper respiratory illness (URI), which is another term for the common cold. This illness is contagious during the first few days. It is spread through the air by coughing and sneezing. It may also be spread by direct contact (touching the sick person and then touching your own eyes, nose, or mouth). Frequent handwashing will decrease risk of spread. Most viral illnesses go away within 7 to 10 days with rest and simple home remedies. Sometimes the illness may last for several weeks. Antibiotics will not kill a virus, and they are generally not prescribed for this condition.  Home care    If symptoms are severe, rest at home for the first 2 to 3 days. When you resume activity, don't let yourself get too tired.    Don't smoke. If you need help stopping, talk with your healthcare provider.    Avoid being exposed to cigarette smoke (yours or others ).    You may use acetaminophen or ibuprofen to control pain and fever, unless another medicine was prescribed. If you have chronic liver or kidney disease, have ever had a stomach ulcer or gastrointestinal bleeding, or are  taking blood-thinning medicines, talk with your healthcare provider before using these medicines. Aspirin should never be given to anyone under 18 years of age who is ill with a viral infection or fever. It may cause severe liver or brain damage.    Your appetite may be poor, so a light diet is fine. Stay well hydrated by drinking 6 to 8 glasses of fluids per day (water, soft drinks, juices, tea, or soup). Extra fluids will help loosen secretions in the nose and lungs.    Over-the-counter cold medicines will not shorten the length of time you re sick, but they may be helpful for the following symptoms: cough, sore throat, and nasal and sinus congestion. If you take prescription medicines, ask your healthcare provider or pharmacist which over-the-counter medicines are safe to use. (Note: Don't use decongestants if you have high blood pressure.)  Follow-up care  Follow up with your healthcare provider, or as advised.  When to seek medical advice  Call your healthcare provider right away if any of these occur:    Cough with lots of colored sputum (mucus)    Severe headache; face, neck, or ear pain    Difficulty swallowing due to throat pain    Fever of 100.4 F (38 C) or higher, or as directed by your healthcare provider  Call 911  Call 911 if any of these occur:    Chest pain, shortness of breath, wheezing, or difficulty breathing    Coughing up blood    Very severe pain with swallowing, especially if it goes along with a muffled voice   [employer] last reviewed this educational content on 6/1/2018 2000-2021 [employer]. All rights reserved. This information is not intended as a substitute for professional medical care. Always follow your healthcare professional's instructions.

## 2021-11-19 ENCOUNTER — LAB (OUTPATIENT)
Dept: FAMILY MEDICINE | Facility: CLINIC | Age: 66
End: 2021-11-19
Attending: PHYSICIAN ASSISTANT
Payer: COMMERCIAL

## 2021-11-19 DIAGNOSIS — Z11.52 ENCOUNTER FOR SCREENING LABORATORY TESTING FOR COVID-19 VIRUS: ICD-10-CM

## 2021-11-19 PROCEDURE — 99207 PR NO CHARGE LOS: CPT

## 2021-11-19 PROCEDURE — U0005 INFEC AGEN DETEC AMPLI PROBE: HCPCS

## 2021-11-19 PROCEDURE — U0003 INFECTIOUS AGENT DETECTION BY NUCLEIC ACID (DNA OR RNA); SEVERE ACUTE RESPIRATORY SYNDROME CORONAVIRUS 2 (SARS-COV-2) (CORONAVIRUS DISEASE [COVID-19]), AMPLIFIED PROBE TECHNIQUE, MAKING USE OF HIGH THROUGHPUT TECHNOLOGIES AS DESCRIBED BY CMS-2020-01-R: HCPCS

## 2021-11-19 NOTE — LETTER
November 30, 2021      Donnie Ernandez  [address] [place] [address] [place] [address]        Dear ,    We are writing to inform you of your test results.        Resulted Orders   Symptomatic COVID-19 Virus (Coronavirus) by PCR Nose   Result Value Ref Range    SARS CoV2 PCR Negative Negative, Testing sent to reference lab. Results will be returned via unsolicited result      Comment:      NEGATIVE: SARS-CoV-2 (COVID-19) RNA not detected, presumed negative.    Narrative    Testing was performed using the jann SARS-CoV-2 assay on the jann  [employer] System. This test should be ordered for the detection of  SARS-CoV-2 in individuals who meet SARS-CoV-2 clinical and/or  epidemiological criteria. Test performance is unknown in asymptomatic  patients. This test is for in vitro diagnostic use under the FDA EUA  for laboratories certified under CLIA to perform high and/or moderate  complexity testing. This test has not been FDA cleared or approved. A  negative result does not rule out the presence of PCR inhibitors in  the specimen or target RNA in concentration below the limit of  detection for the assay. The possibility of a false negative should  be considered if the patient's recent exposure or clinical  presentation suggests COVID-19. This test was validated by the [facility]. This  laboratory is certified under the Clinical Laboratory Improvement  Amendments of 1988 (CLIA-88) as qualified to perform high and/or  moderate complexity laboratory testing.       If you have any questions or concerns, please call the clinic at the number listed above.       Sincerely,      Maribell Naranjo PA-C

## 2021-11-20 ENCOUNTER — TELEPHONE (OUTPATIENT)
Dept: FAMILY MEDICINE | Facility: CLINIC | Age: 66
End: 2021-11-20
Payer: COMMERCIAL

## 2021-11-20 LAB — SARS-COV-2 RNA RESP QL NAA+PROBE: NEGATIVE

## 2021-11-20 NOTE — TELEPHONE ENCOUNTER
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
## 2022-02-22 DIAGNOSIS — F51.01 PRIMARY INSOMNIA: ICD-10-CM

## 2022-02-23 RX ORDER — ZOLPIDEM TARTRATE 12.5 MG/1
12.5 TABLET, FILM COATED, EXTENDED RELEASE ORAL
Qty: 30 TABLET | Refills: 0 | Status: SHIPPED | OUTPATIENT
Start: 2022-02-23 | End: 2022-02-25

## 2022-02-25 ENCOUNTER — VIRTUAL VISIT (OUTPATIENT)
Dept: FAMILY MEDICINE | Facility: CLINIC | Age: 67
End: 2022-02-25
Payer: COMMERCIAL

## 2022-02-25 VITALS — BODY MASS INDEX: 26.15 KG/M2 | WEIGHT: 162 LBS

## 2022-02-25 DIAGNOSIS — K52.9 CHRONIC DIARRHEA: ICD-10-CM

## 2022-02-25 DIAGNOSIS — N18.31 STAGE 3A CHRONIC KIDNEY DISEASE (H): ICD-10-CM

## 2022-02-25 DIAGNOSIS — F51.01 PRIMARY INSOMNIA: Primary | ICD-10-CM

## 2022-02-25 DIAGNOSIS — I10 BENIGN ESSENTIAL HYPERTENSION: ICD-10-CM

## 2022-02-25 DIAGNOSIS — R93.2 ABNORMAL ULTRASOUND OF GALLBLADDER: ICD-10-CM

## 2022-02-25 PROCEDURE — 99214 OFFICE O/P EST MOD 30 MIN: CPT | Mod: TEL | Performed by: NURSE PRACTITIONER

## 2022-02-25 RX ORDER — LOSARTAN POTASSIUM 100 MG/1
100 TABLET ORAL DAILY
Qty: 90 TABLET | Refills: 3 | Status: SHIPPED | OUTPATIENT
Start: 2022-02-25 | End: 2023-03-16

## 2022-02-25 RX ORDER — METOPROLOL TARTRATE 50 MG
50 TABLET ORAL 2 TIMES DAILY
Qty: 180 TABLET | Refills: 3 | Status: SHIPPED | OUTPATIENT
Start: 2022-02-25 | End: 2022-12-13

## 2022-02-25 RX ORDER — ZOLPIDEM TARTRATE 12.5 MG/1
12.5 TABLET, FILM COATED, EXTENDED RELEASE ORAL
Qty: 30 TABLET | Refills: 5 | Status: SHIPPED | OUTPATIENT
Start: 2022-02-25 | End: 2022-08-17

## 2022-02-25 NOTE — PROGRESS NOTES
Donnie is a 66 year old who is being evaluated via a billable telephone visit.      What phone number would you like to be contacted at? [phone]  How would you like to obtain your AVS? MyChart    Assessment & Plan     Primary insomnia  Well-controlled  - zolpidem ER (AMBIEN CR) 12.5 MG CR tablet; Take 1 tablet (12.5 mg) by mouth nightly as needed for sleep    Benign essential hypertension  Well-controlled.  - metoprolol tartrate (LOPRESSOR) 50 MG tablet; Take 1 tablet (50 mg) by mouth 2 times daily  - losartan (COZAAR) 100 MG tablet; Take 1 tablet (100 mg) by mouth daily    Chronic diarrhea  Ongoing issue.  Work-up last year was negative for etiology.  Recommend patient add Metamucil 1-2 times a day to add bulk.  If that is not effective, recommend gastroenterology consult.  - Adult Gastro Ref - Consult Only; Future    Abnormal ultrasound of gallbladder  Recommend follow-up on the abnormality seen on both the CT scan and the ultrasound last year.  - MR Liver wo & w Contrast; Future    Stage 3a chronic kidney disease (H)  Known issue that I take into account for their medical decisions, no current exacerbations or new concerns      The risks, benefits and treatment options of prescribed medications or other treatments have been discussed with the patient. The patient verbalized their understanding and should call or follow up if no improvement or if they develop further problems.    MAYANK Lutz [facility]   Donnie is a 66 year old who presents for the following health issues     HPI     Medication Followup of Ambien for insomnia:    Taking Medication as prescribed: yes    Side Effects:  None    Medication Helping Symptoms:  yes       Diarrhea  Onset/Duration: A couple years   Description:       Consistency of stool: watery, runny, yellow and explosive       Blood in stool: no       Number of loose stools past 24 hours: about 10 or so a day. Depending on the  day. Has gone 4 times already today.   Progression of Symptoms: worsening  Accompanying signs and symptoms:       Fever: no       Nausea/Vomiting: no       Abdominal pain: no       Weight loss: YES, appetite has decreased due to not wanting to pass more stools. Trying a high fiber diet        Episodes of constipation: no  History   Ill contacts: no  Recent use of antibiotics: no  Recent travels: no  Recent medication-new or changes(Rx or OTC): no  Precipitating or alleviating factors: gassy   Therapies tried and outcome: Imodium occasionally     Patient had this worked up a year ago:  All labs normal including CMP, TSH, TTG, lipase, amylase, and stool studies for C. difficile and enteric pathogens.  CT scan obtained which showed mild wall thickening of the descending and sigmoid colon.  Incidental finding of possible abnormality in the gallbladder fundus; ultrasound was recommended.  Ultrasound showed hyperechoic area in the gallbladder fundus and dedicated liver MRI was recommended.  Patient did not have MRI done as he felt he could not afford it at the time.  Colonoscopy was normal except for 1 polyp removed.  Biopsies were taken to rule out collagenous colitis and were negative.      Wt Readings from Last 4 Encounters:   02/25/22 73.5 kg (162 lb)   10/12/21 74.4 kg (164 lb)   10/08/21 74.8 kg (165 lb)   04/30/21 76.7 kg (169 lb)         Hypertension:  Patient does not check blood pressure at home, however when he has it checked in the community it is always within normal range.  Tolerating medications without side effects.      Review of Systems   Constitutional, HEENT, cardiovascular, pulmonary, gi and gu systems are negative, except as otherwise noted.      Objective           Vitals:  No vitals were obtained today due to virtual visit.    Physical Exam   PSYCH: Alert and oriented times 3; coherent speech, normal   rate and volume, able to articulate logical thoughts, able   to abstract reason, no tangential  thoughts, no hallucinations   or delusions    RESP: No cough, no audible wheezing, able to talk in full sentences  Remainder of exam unable to be completed due to telephone visits                Phone call duration: 12 minutes

## 2022-02-25 NOTE — PATIENT INSTRUCTIONS
Take Metamucil once or twice daily to add bulk to your stool.    Schedule MRI of liver/gallbladder      If no improvement in diarrhea with the Metamucil, schedule an appointment with the gastroenterologist.

## 2022-03-05 ENCOUNTER — HOSPITAL ENCOUNTER (OUTPATIENT)
Dept: MRI IMAGING | Facility: CLINIC | Age: 67
Discharge: HOME | End: 2022-03-05
Attending: NURSE PRACTITIONER | Admitting: NURSE PRACTITIONER
Payer: COMMERCIAL

## 2022-03-05 DIAGNOSIS — R93.2 ABNORMAL ULTRASOUND OF GALLBLADDER: ICD-10-CM

## 2022-03-05 PROCEDURE — 255N000002 HC RX 255 OP 636: Performed by: NURSE PRACTITIONER

## 2022-03-05 PROCEDURE — 74183 MRI ABD W/O CNTR FLWD CNTR: CPT

## 2022-03-05 PROCEDURE — A9585 GADOBUTROL INJECTION: HCPCS | Performed by: NURSE PRACTITIONER

## 2022-03-05 RX ORDER — GADOBUTROL 604.72 MG/ML
7 INJECTION INTRAVENOUS ONCE
Status: COMPLETED | OUTPATIENT
Start: 2022-03-05 | End: 2022-03-05

## 2022-03-05 RX ADMIN — GADOBUTROL 7 ML: 604.72 INJECTION INTRAVENOUS at 08:31

## 2022-03-08 ENCOUNTER — TELEPHONE (OUTPATIENT)
Dept: FAMILY MEDICINE | Facility: CLINIC | Age: 67
End: 2022-03-08
Payer: COMMERCIAL

## 2022-03-08 DIAGNOSIS — K83.9 BILE DUCT ABNORMALITY: Primary | ICD-10-CM

## 2022-03-08 PROCEDURE — 99207 E-CONSULT TO GASTROENTEROLOGY (ADULT OUTPT PROVIDER TO SPECIALIST WRITTEN QUESTION & RESPONSE): CPT | Performed by: NURSE PRACTITIONER

## 2022-03-09 NOTE — TELEPHONE ENCOUNTER
MRI showed dilated bile duct, no other pathology. The pancreas, adrenal glands, and kidneys are normal.    Recommend to see GI doctor for consult. Referral signed    MAYANK Chaudhari CNP, covering for PCP

## 2022-03-09 NOTE — RESULT ENCOUNTER NOTE
Looks like this was already addressed by Florencia Green and a letter was sent by staff to patient.

## 2022-03-10 ENCOUNTER — TELEPHONE (OUTPATIENT)
Dept: GASTROENTEROLOGY | Facility: CLINIC | Age: 67
End: 2022-03-10
Payer: COMMERCIAL

## 2022-03-10 NOTE — TELEPHONE ENCOUNTER
Forwarding to provider.  Patient interested in E-Consult.  Is this an option in this situation?    Provider's note and recommendations below reviewed with patient with understanding.  Patient states GI already reached out to schedule consult appt, and the locations were [facility] and [place], not convenient for him.  Patient is interested in E-Consult between primary care and GI in lieu of him driving for consult. RN did advise that we would still bill to insurance, or there may be a fee for this. Patient is agreeable.  RN advised will forward to provider to see if this is an option in this case.     Mitra Campbell RN  [facility]

## 2022-03-10 NOTE — TELEPHONE ENCOUNTER
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  - MR Liver wo & w Contrast; Future    LFTs WNL last checked October 2021    MR Liver 3/5/22                                                                 IMPRESSION:  1.  Serpiginous T2 hyperintense structure at the interface of the distal gallbladder to the hepatic parenchyma has the appearance of a dilated duct of Luschka that appears to encircle normal hepatic parenchyma. This likely accounts for the abnormality   seen on prior ultrasound.    CT 3/31/21  IMPRESSION:   1.  No acute intra-abdominal or intrapelvic process.  2.  Mild wall thickening of the descending and sigmoid colon, likely  secondary to underdistention. Low-grade colitis is, however, difficult  to entirely exclude.  3.  Several indeterminate subcentimeter pulmonary nodules, unchanged  and likely benign.  4.  Similar-appearing renal cortical scarring.  5.  Similar-appearing suspected decompressed phrygian cap of the  gallbladder. A mass at the gallbladder fundus is, however, difficult  to entirely exclude. Consider right upper quadrant ultrasound for  further evaluation if desired.  6.  Similar-appearing enlarged portacaval lymph node, nonspecific.  7.  Similar-appearing soft tissue nodule in the left upper quadrant  adjacent to the superior pole of the left kidney as well as in the  mesenteric fat of the anterior left lower quadrant, possibly  representing splenules. Prior splenectomy    MD review date: 3/10  MD Decision for clinic consultation/Orders:             Referral updates/Patient contacted:

## 2022-03-10 NOTE — TELEPHONE ENCOUNTER
Pt called asking for MRI results, he does not use [employer] as it has not been set up yet. Activation code sent to [email or web address], copy of MRI results sent to that address per his request, pt is aware that it is not a secure e-mail. Elisabet Garay RN

## 2022-03-11 ENCOUNTER — TELEPHONE (OUTPATIENT)
Dept: FAMILY MEDICINE | Facility: CLINIC | Age: 67
End: 2022-03-11

## 2022-03-11 ENCOUNTER — E-CONSULT (OUTPATIENT)
Dept: GASTROENTEROLOGY | Facility: CLINIC | Age: 67
End: 2022-03-11
Payer: COMMERCIAL

## 2022-03-11 DIAGNOSIS — K83.8 DILATED BILE DUCT: Primary | ICD-10-CM

## 2022-03-11 PROCEDURE — 99451 NTRPROF PH1/NTRNET/EHR 5/>: CPT | Performed by: INTERNAL MEDICINE

## 2022-03-11 NOTE — TELEPHONE ENCOUNTER
Please call patient and inform him about GI consult;    Dilated bile duct is an incidental finding. It should not cause any symptoms and should not require any follow up.     However, if the patient ever requires a cholecystectomy it will be important for the surgeon to know of this duct of Luschka as it will put the patient at risk for a bile leak post-op.    MAYANK Chaudhari CNP, covering for PCP

## 2022-03-11 NOTE — PROGRESS NOTES
ALL SMARTFIELDS MUST BE COMPLETED FOR PATIENT CARE AND BILLING    3/11/2022     E-Consult has been accepted.    Interprofessional consultation requested by:  Florencia Green APRN CNP      Clinical Question/Purpose: MY CLINICAL QUESTION IS: dilated Luchka duct, if any follow up, or additional work up needed?     MRI liver:  IMPRESSION:  1.  Serpiginous T2 hyperintense structure at the interface of the distal gallbladder to the hepatic parenchyma has the appearance of a dilated duct of Luschka that appears to encircle normal hepatic parenchyma. This likely accounts for the abnormality   seen on prior ultrasound.    Patient assessment and information reviewed:   1. Incidental finding of dilated duct of Luschka (initially seen on CT and US and then further evaluated with MRI liver)    This is an incidental finding. It should not cause any symptoms and should not require any follow up.    However, if the patient ever requires a cholecystectomy it will be important for the surgeon to know of this duct of Luschka as it will put the patient at risk for a bile leak post-op.    Recommendations:   -no follow up needed  -if cholecystectomy every needed the surgeon should be aware of this anatomy to help prevent post-op bile leak    This was discussed with our advanced endoscopy team.       The recommendations provided in this E-Consult are based on a review of clinical data pertinent to the clinical question presented, without a review of the patient's complete medical record or, the benefit of a comprehensive in-person or virtual patient evaluation. This consultation should not replace the clinical judgement and evaluation of the provider ordering this E-Consult. Any new clinical issues, or changes in patient status since the filing of this E-Consult will need to be taken into account when assessing these recommendations. Please contact me if you have further questions.    My total time spent reviewing clinical  information and formulating assessment was 15 minutes.    Report sent automatically to requesting provider once signed.     Chinmay Zhu MD

## 2022-03-14 NOTE — TELEPHONE ENCOUNTER
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 Berrios. No answer. Left VM for Pt to call back.    Klaus Barreto LPN

## 2022-03-16 ENCOUNTER — TELEPHONE (OUTPATIENT)
Dept: FAMILY MEDICINE | Facility: CLINIC | Age: 67
End: 2022-03-16
Payer: COMMERCIAL

## 2022-03-16 DIAGNOSIS — K82.4 GALLBLADDER POLYP: ICD-10-CM

## 2022-03-16 NOTE — TELEPHONE ENCOUNTER
Mae  Pt called about if GI follow up needed. Gave him Florencia's message from 3-11 about duct of Luschka. He is wondering about the polyp noted in the gallbladder and if there is any concern about that.     Also regarding chronic diarrhea  Pt has been taking 1tsp metamucil bid and it has been helping his frequency and consistency of stool. Wants to know if he should continue this or possibly increase this for even better results. His stools are not watery anymore but are soft. Should he still follow up with GI or just continue this treatment      Mitchell Rubio RN

## 2022-03-16 NOTE — TELEPHONE ENCOUNTER
The gallbladder polyp was small.  At its current size, the recommendation is to repeat a gallbladder ultrasound in 1 year.    As for the diarrhea, if the Metamucil is working to control his stool frequency and consistency, then he can continue it and hold off on a GI consult.  He can increase the Metamucil if needed to 2 teaspoons twice daily.    Mae Jones, CNP

## 2022-03-17 NOTE — TELEPHONE ENCOUNTER
Pt called back to schedule appt with Dr. Berrios. Pt scheduled on 4/28 at 8:15am. Pt would like text link sent to his iphone at [phone].    Klaus Barreto LPN

## 2022-03-17 NOTE — TELEPHONE ENCOUNTER
Called Pt to get him scheduled for an appt with Dr. Berrios. Per Pt, he has to check with his co-worker about connecting for a video visit at work -he wanted to make sure he can connect. Pt stated he will call back to schedule in about half an hour.    Klaus Barreto LPN

## 2022-03-29 DIAGNOSIS — F51.01 PRIMARY INSOMNIA: ICD-10-CM

## 2022-03-29 RX ORDER — ZOLPIDEM TARTRATE 12.5 MG/1
12.5 TABLET, FILM COATED, EXTENDED RELEASE ORAL
Qty: 30 TABLET | Refills: 5 | OUTPATIENT
Start: 2022-03-29

## 2022-04-28 ENCOUNTER — VIRTUAL VISIT (OUTPATIENT)
Dept: GASTROENTEROLOGY | Facility: CLINIC | Age: 67
End: 2022-04-28
Payer: COMMERCIAL

## 2022-04-28 DIAGNOSIS — R19.7 DIARRHEA, UNSPECIFIED TYPE: Primary | ICD-10-CM

## 2022-04-28 DIAGNOSIS — K83.9 BILE DUCT ABNORMALITY: ICD-10-CM

## 2022-04-28 PROCEDURE — 99204 OFFICE O/P NEW MOD 45 MIN: CPT | Mod: 95 | Performed by: INTERNAL MEDICINE

## 2022-04-28 NOTE — PATIENT INSTRUCTIONS
Follow up:    Dr. Berrios has outlined the following steps after your recent clinic visit:    The gallbladder polyp deserves surveillance in one year by [facility] US and if without growth, further surveillance can be deferred.      2. In terms of the diarrhea, he provides a solid history suggestive of bacterial overgrowth. We will therefore organize a lactulose breath test for evaluation. Scheduling will reach out to organize this study.    Please call with any questions or concerns regarding your clinic visit today.    It is a pleasure being involved in your health care.    Contacts post-consultation depending on your need:    Schedule Clinic Appointments            [phone] # 1   M-F 7:30 - 5 pm    Drea Bird RN Care Coordinator  [phone]     OR Procedure Scheduling                             [phone]    For urgent/emergent questions after business hours, you may reach the on-call GI Fellow by contacting the [facility]  at [phone].    How do I schedule labs, imaging studies, or procedures that were ordered in clinic today?     Labs: To schedule lab appointment at the [facility] and [facility], use my chart or call [phone]. If you have a [facility] lab closer to home where you are regularly seen you can give them a call.     Procedures: If a colonoscopy, upper endoscopy, breath test, esophageal manometry, or pH impedence was ordered today, our endoscopy team will call you to schedule this. If you have not heard from our endoscopy team within a week, please call [phone] to schedule.     Imaging Studies: If you were scheduled for a CT scan, X-ray, MRI, ultrasound, HIDA scan or other imaging study, please call [phone] to have this scheduled.     Referral: If a referral to another specialty was ordered, expect a phone call or follow instructions above. If you have not heard from anyone regarding your referral in a week, please call our clinic to check the status.     How  to I schedule a follow-up visit?  If you did not schedule a follow-up visit today, please call [phone] to schedule a follow-up office visit.
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
## 2022-04-28 NOTE — PROGRESS NOTES
Donnie is a 66 year old who is being evaluated via a billable video visit.      How would you like to obtain your AVS? Mail a copy  If the video visit is dropped, the invitation should be resent by: Text to cell phone: [phone]  Will anyone else be joining your video visit? No

## 2022-08-17 DIAGNOSIS — F51.01 PRIMARY INSOMNIA: ICD-10-CM

## 2022-08-17 RX ORDER — ZOLPIDEM TARTRATE 12.5 MG/1
12.5 TABLET, FILM COATED, EXTENDED RELEASE ORAL
Qty: 30 TABLET | Refills: 0 | Status: SHIPPED | OUTPATIENT
Start: 2022-08-26 | End: 2022-09-28

## 2022-08-17 NOTE — TELEPHONE ENCOUNTER
Refilled once while pcp is out.  Checked pdmp and it was appropriate.  Husam Marr MD  Family Medicine

## 2022-12-12 ENCOUNTER — TELEPHONE (OUTPATIENT)
Dept: FAMILY MEDICINE | Facility: CLINIC | Age: 67
End: 2022-12-12

## 2022-12-13 ENCOUNTER — OFFICE VISIT (OUTPATIENT)
Dept: FAMILY MEDICINE | Facility: CLINIC | Age: 67
End: 2022-12-13
Payer: COMMERCIAL

## 2022-12-13 VITALS
DIASTOLIC BLOOD PRESSURE: 54 MMHG | WEIGHT: 166.2 LBS | TEMPERATURE: 98.4 F | OXYGEN SATURATION: 97 % | HEIGHT: 66 IN | HEART RATE: 56 BPM | BODY MASS INDEX: 26.71 KG/M2 | SYSTOLIC BLOOD PRESSURE: 146 MMHG

## 2022-12-13 DIAGNOSIS — I10 ESSENTIAL HYPERTENSION, BENIGN: ICD-10-CM

## 2022-12-13 DIAGNOSIS — N18.31 STAGE 3A CHRONIC KIDNEY DISEASE (H): ICD-10-CM

## 2022-12-13 DIAGNOSIS — B34.9 VIRAL ILLNESS: ICD-10-CM

## 2022-12-13 DIAGNOSIS — K52.9 NON-SPECIFIC COLITIS: ICD-10-CM

## 2022-12-13 DIAGNOSIS — R19.7 DIARRHEA, UNSPECIFIED TYPE: Primary | ICD-10-CM

## 2022-12-13 LAB
ALBUMIN SERPL BCG-MCNC: 4.1 G/DL (ref 3.5–5.2)
ALBUMIN UR-MCNC: NEGATIVE MG/DL
ALP SERPL-CCNC: 96 U/L (ref 40–129)
ALT SERPL W P-5'-P-CCNC: 21 U/L (ref 10–50)
ANION GAP SERPL CALCULATED.3IONS-SCNC: 13 MMOL/L (ref 7–15)
APPEARANCE UR: CLEAR
AST SERPL W P-5'-P-CCNC: 26 U/L (ref 10–50)
BASOPHILS # BLD AUTO: 0.1 10E3/UL (ref 0–0.2)
BASOPHILS NFR BLD AUTO: 1 %
BILIRUB SERPL-MCNC: 0.6 MG/DL
BILIRUB UR QL STRIP: NEGATIVE
BUN SERPL-MCNC: 14 MG/DL (ref 8–23)
CALCIUM SERPL-MCNC: 9 MG/DL (ref 8.8–10.2)
CHLORIDE SERPL-SCNC: 107 MMOL/L (ref 98–107)
COLOR UR AUTO: YELLOW
CREAT SERPL-MCNC: 1.06 MG/DL (ref 0.67–1.17)
CREAT UR-MCNC: 214.9 MG/DL
DEPRECATED HCO3 PLAS-SCNC: 21 MMOL/L (ref 22–29)
EOSINOPHIL # BLD AUTO: 0.3 10E3/UL (ref 0–0.7)
EOSINOPHIL NFR BLD AUTO: 2 %
ERYTHROCYTE [DISTWIDTH] IN BLOOD BY AUTOMATED COUNT: 13 % (ref 10–15)
GFR SERPL CREATININE-BSD FRML MDRD: 77 ML/MIN/1.73M2
GLUCOSE SERPL-MCNC: 100 MG/DL (ref 70–99)
GLUCOSE UR STRIP-MCNC: NEGATIVE MG/DL
HCT VFR BLD AUTO: 35.7 % (ref 40–53)
HGB BLD-MCNC: 11.4 G/DL (ref 13.3–17.7)
HGB UR QL STRIP: NEGATIVE
KETONES UR STRIP-MCNC: NEGATIVE MG/DL
LEUKOCYTE ESTERASE UR QL STRIP: NEGATIVE
LYMPHOCYTES # BLD AUTO: 2.7 10E3/UL (ref 0.8–5.3)
LYMPHOCYTES NFR BLD AUTO: 22 %
MCH RBC QN AUTO: 31.9 PG (ref 26.5–33)
MCHC RBC AUTO-ENTMCNC: 31.9 G/DL (ref 31.5–36.5)
MCV RBC AUTO: 100 FL (ref 78–100)
MICROALBUMIN UR-MCNC: <12 MG/L
MICROALBUMIN/CREAT UR: NORMAL MG/G{CREAT}
MONOCYTES # BLD AUTO: 1.3 10E3/UL (ref 0–1.3)
MONOCYTES NFR BLD AUTO: 10 %
NEUTROPHILS # BLD AUTO: 8.4 10E3/UL (ref 1.6–8.3)
NEUTROPHILS NFR BLD AUTO: 66 %
NITRATE UR QL: NEGATIVE
PH UR STRIP: 5.5 [PH] (ref 5–7)
PLATELET # BLD AUTO: 435 10E3/UL (ref 150–450)
POTASSIUM SERPL-SCNC: 3.9 MMOL/L (ref 3.4–5.3)
PROT SERPL-MCNC: 7.5 G/DL (ref 6.4–8.3)
RBC # BLD AUTO: 3.57 10E6/UL (ref 4.4–5.9)
SODIUM SERPL-SCNC: 141 MMOL/L (ref 136–145)
SP GR UR STRIP: 1.02 (ref 1–1.03)
UROBILINOGEN UR STRIP-ACNC: 0.2 E.U./DL
WBC # BLD AUTO: 12.7 10E3/UL (ref 4–11)

## 2022-12-13 PROCEDURE — 99214 OFFICE O/P EST MOD 30 MIN: CPT | Performed by: NURSE PRACTITIONER

## 2022-12-13 PROCEDURE — 36415 COLL VENOUS BLD VENIPUNCTURE: CPT | Performed by: NURSE PRACTITIONER

## 2022-12-13 PROCEDURE — 81003 URINALYSIS AUTO W/O SCOPE: CPT | Performed by: NURSE PRACTITIONER

## 2022-12-13 PROCEDURE — 82043 UR ALBUMIN QUANTITATIVE: CPT | Performed by: NURSE PRACTITIONER

## 2022-12-13 PROCEDURE — 85025 COMPLETE CBC W/AUTO DIFF WBC: CPT | Performed by: NURSE PRACTITIONER

## 2022-12-13 PROCEDURE — 80053 COMPREHEN METABOLIC PANEL: CPT | Performed by: NURSE PRACTITIONER

## 2022-12-13 RX ORDER — METOPROLOL SUCCINATE 100 MG/1
100 TABLET, EXTENDED RELEASE ORAL DAILY
Qty: 90 TABLET | Refills: 3 | Status: SHIPPED | OUTPATIENT
Start: 2022-12-13 | End: 2023-12-20

## 2022-12-13 RX ORDER — ZOLPIDEM TARTRATE 12.5 MG/1
TABLET, FILM COATED, EXTENDED RELEASE ORAL
COMMUNITY
Start: 2022-11-28 | End: 2023-01-16

## 2022-12-13 NOTE — LETTER
[facility]  [address] [address]  Phone: [phone]    December 13, 2022        Donnie Ernandez  [address] [place] [address]          To whom it may concern:    RE: Donnie Ernandez    Patient was seen and treated today at our clinic and missed work 12/12/2022 and 12/13/2022      Please contact me for questions or concerns.      Sincerely,        Meri Yadav, NP

## 2022-12-13 NOTE — TELEPHONE ENCOUNTER
RN notes patient saw Meri Yadav in clinic today, so closing this encounter.    Mitra Campbell RN  [facility] [place] [facility]

## 2022-12-13 NOTE — TELEPHONE ENCOUNTER
Reason for Call:  Other appointment    Detailed comments: Patient would like an appointment tomorrow in [place] he stated he is not feeling good and wants to be seen.    Phone Number Patient can be reached at: Cell number on file:    Telephone Information:   Mobile [phone]       Best Time: Anytime    Can we leave a detailed message on this number? YES    Call taken on 12/12/2022 at 6:16 PM by Jennifer Pruitt

## 2022-12-13 NOTE — PROGRESS NOTES
"  Assessment & Plan     Essential hypertension, benign  Difficulty remembering morning metoprolol dose. Discontinue metroprolol bid and start metoprolol ER at bedtime once daily.  - metoprolol succinate ER (TOPROL XL) 100 MG 24 hr tablet  Dispense: 90 tablet; Refill: 3    Stage 3a chronic kidney disease (H)  Discussed diagnosis and importance of staying hydrated, low sodium diet, exercise, and limiting alcohol. Followup with PCP in 1 month for annual. Collect labs today  - UA Macro with Reflex to Micro and Culture - lab collect  - Albumin Random Urine Quantitative with Creat Ratio    Diarrhea, unspecified type  Acute on chronic. Had full work up with PCP 1 year ago and was negative, including CT, Abdominal US and celiac testing.    Discussed using OTC immodium once acute illness resolves for chronic diarrhea. Followup with GI for further management options.   - Comprehensive metabolic panel (BMP + Alb, Alk Phos, ALT, AST, Total. Bili, TP)  - CBC with platelets and differential  - Adult GI  Referral - Consult Only    Viral illness  Discussed pushing fluids, bland diet advancing as tolerated. If symptoms have not improved after 2 weeks or worsen, if no improvement follow up in clinic for stool testing.    Non-specific colitis on CT but biopsies negative on colonoscopy.  Refer to GI  - Adult GI  Referral - Consult Only            BMI:   Estimated body mass index is 26.83 kg/m  as calculated from the following:    Height as of this encounter: 1.676 m (5' 6\").    Weight as of this encounter: 75.4 kg (166 lb 3.2 oz).   Weight management plan: Discussed healthy diet and exercise guidelines    See Patient Instructions    Return in about 4 weeks (around 1/10/2023).    Meri Yadav NP  [facility] is a 67 year old, presenting for the following health issues:  Sick      HPI     Acute Illness  Acute illness concerns:   Onset/Duration: since Saturday " "  Symptoms:  Fever: No  Chills/Sweats: YES  Headache (location?): YES- slight   Sinus Pressure: No  Conjunctivitis:  No  Ear Pain: no  Rhinorrhea: YES  Congestion: YES  Sore Throat: No. Neck is sore   Cough: YES - slight and dry cough   Wheeze: No. Some shortness of breath   Decreased Appetite: No  Nausea: no   Vomiting: No  Diarrhea: YES. Lower back aches   Dysuria/Freq.: No  Dysuria or Hematuria: No  Fatigue/Achiness: No  Sick/Strep Exposure: work   Therapies tried and outcome: none      History of CDK stage III  History of chronic diarrhea - work up last years with PCP was negative including celiac disease.   CT showed low grade colitis but biopsies negative.  US negative.     Was tested for covid and flu yesterday both negative      Wondering if can get a note for work for yesterday and today.     Pt presents with viral symptoms beginning Sunday morning. Stomach pain, cramping, increased diarrhea waking pt up multiple times per night. Symptoms have improved closer to baseline chronic diarrhea since. Has had 4 episodes of diarrhea today. No confirmed fevers but chills, aches. Pt reports adequate food and fluid intake. Negative covid and influenza tests on Sunday. No nausea/vomiting, blood in stool. Hx of Splenectomy, chronic diarrhea with colonoscopy 2021. Was referred to GI but does not want to drive downtown.     BP elevated- pt reports difficulty with morning metoprolol dosing but states he takes HS meds religiously.       Review of Systems   Constitutional: Chills, body aches. Negative appetite changes.   Pulm: denies SOB, cough, difficulty breathing  CV: + dizziness, no papitations, racing heart, edema  GI: + epigastric tenderness, cramping, diarrhea. No n/v, blood in stool  : smelly urine. No pain, burning.      Objective    BP (!) 150/68   Pulse 56   Temp 98.4  F (36.9  C) (Tympanic)   Ht 1.676 m (5' 6\")   Wt 75.4 kg (166 lb 3.2 oz)   SpO2 97%   BMI 26.83 kg/m    Body mass index is 26.83 " kg/m .  Physical Exam   GENERAL: healthy, alert and no distress  NECK: no adenopathy, no asymmetry, masses, or scars and thyroid normal to palpation  RESP: lungs clear to auscultation - no rales, rhonchi or wheezes  CV: bradycardia, normal S1 S2, no S3 or S4, no murmur, click or rub, peripheral pulses strong and no peripheral edema  ABDOMEN: Hyperactive bowel sounds x4 quadrants. soft, nontender, without hepatosplenomegaly or masses  MS: no gross musculoskeletal defects noted, no edema  BACK: no CVA tenderness, no paralumbar tenderness  LYMPH: no cervical, supraclavicular, axillary, or inguinal adenopathy

## 2022-12-20 ENCOUNTER — TELEPHONE (OUTPATIENT)
Dept: FAMILY MEDICINE | Facility: CLINIC | Age: 67
End: 2022-12-20

## 2022-12-20 NOTE — TELEPHONE ENCOUNTER
Patient Quality Outreach    Patient is due for the following:   Hypertension -  BP check  Physical Preventive Adult Physical    Next Steps:   Schedule a Adult Preventative    Type of outreach:    Sent letter.      Questions for provider review:    None     Mila Chong MA

## 2023-01-15 DIAGNOSIS — F51.01 PRIMARY INSOMNIA: ICD-10-CM

## 2023-01-16 RX ORDER — ZOLPIDEM TARTRATE 12.5 MG/1
TABLET, FILM COATED, EXTENDED RELEASE ORAL
Qty: 30 TABLET | Refills: 0 | Status: SHIPPED | OUTPATIENT
Start: 2023-01-16 | End: 2023-02-27

## 2023-01-16 NOTE — TELEPHONE ENCOUNTER
Refilled for 1 month.  Patient is due for an appointment with me for further refills.  Mae Jones, CNP

## 2023-01-16 NOTE — TELEPHONE ENCOUNTER
Tried calling pt could not leave a message.Called cell phone number could not get through. [facility] number has not been set up yet.    .Sandra Syed PSC

## 2023-02-15 NOTE — NURSING NOTE
02/15/23 0852   Child Life   Location Surgery  (Laryngoscopy with bronchoscopy, biopsy)   Intervention Family Support;Preparation;Procedure Support   Preparation Comment CFL intern introduced self and services to pt and family. Per nurse, pt would benefit from PIV and mask induction preparation. CFL intern introduced anesthesia mask and offered pt choices for chapstick and stickers to decorate the mask. Pt practiced deep breathing with mask on herself and her stuffed animal. CFL intern introduced PIV and allowed pt to manipulate the IV to gain familiarity. Pt was receptive to photos of what the IV would look like and asked appropriate questions. Anesthesia plan included versed, mask induction, and CFL intern accompanying pt to OR to provide distraction via iPad.   Procedure Support Comment CFL intern and CCLS accompanied pt to OR and observed pt  well from parents. Pt readily engaged in iPad throughout mask induction. Pt fell asleep calmly. Followed up with father to report how pt coped. Family had no further needs.   Family Support Comment Pt present with mother and father.   Anxiety Low Anxiety   Major Change/Loss/Stressor/Fears medical condition, self   Techniques to [place] with Loss/Stress/Change diversional activity;family presence  (stuffed animal, iPad)   Outcomes/Follow Up Continue to Follow/Support        "Chief Complaint   Patient presents with     Cough     cold/cough x 3 days       Initial /80 (BP Location: Left arm, Patient Position: Chair, Cuff Size: Adult Large)  Pulse 85  Temp 100.1  F (37.8  C) (Tympanic)  Ht 5' 6\" (1.676 m)  Wt 178 lb 9.6 oz (81 kg)  SpO2 98%  BMI 28.83 kg/m2 Estimated body mass index is 28.83 kg/(m^2) as calculated from the following:    Height as of this encounter: 5' 6\" (1.676 m).    Weight as of this encounter: 178 lb 9.6 oz (81 kg).  Medication Reconciliation: complete  "

## 2023-02-26 DIAGNOSIS — F51.01 PRIMARY INSOMNIA: ICD-10-CM

## 2023-02-27 RX ORDER — ZOLPIDEM TARTRATE 12.5 MG/1
TABLET, FILM COATED, EXTENDED RELEASE ORAL
Qty: 30 TABLET | Refills: 0 | OUTPATIENT
Start: 2023-02-27

## 2023-02-28 RX ORDER — ZOLPIDEM TARTRATE 12.5 MG/1
TABLET, FILM COATED, EXTENDED RELEASE ORAL
Qty: 30 TABLET | Refills: 0 | Status: SHIPPED | OUTPATIENT
Start: 2023-02-28 | End: 2023-03-16

## 2023-02-28 NOTE — TELEPHONE ENCOUNTER
Medication Question or Refill    Contacts       Type Contact Phone/Fax    02/26/2023 10:37 AM CST Interface (Incoming) [facility] [address] [phone]    02/27/2023 04:59 PM CST Phone (Outgoing) [facility]. [address] [phone]    02/27/2023 05:00 PM CST Phone (Outgoing) Donnie Ernandez (Self) [phone] (H)    02/27/2023 06:16 PM CST Phone (Incoming) Donnie Ernandez (Self) [phone] (M)          What medication are you calling about (include dose and sig)?: Zolpidem 12. 5 mg 1 tabs in the PM    Preferred Pharmacy:   [facility] [address] [address]  Phone: [phone] Fax: [phone]      Controlled Substance Agreement on file:   CSA -- Patient Level:    CSA: None found at the patient level.       Who prescribed the medication?: [facility]    Do you need a refill? Yes    When did you use the medication last? Last night.     Patient offered an appointment? Yes: -Pt is schedule 3/16/23 for physical     Do you have any questions or concerns?  Yes: pt has 1 pill left. Would like additional refills until appointment.      Okay to leave a detailed message?: Yes at Cell number on file:    Telephone Information:   Mobile [phone]

## 2023-03-16 ENCOUNTER — OFFICE VISIT (OUTPATIENT)
Dept: FAMILY MEDICINE | Facility: CLINIC | Age: 68
End: 2023-03-16
Payer: COMMERCIAL

## 2023-03-16 VITALS
WEIGHT: 156 LBS | OXYGEN SATURATION: 98 % | HEART RATE: 68 BPM | SYSTOLIC BLOOD PRESSURE: 134 MMHG | RESPIRATION RATE: 16 BRPM | DIASTOLIC BLOOD PRESSURE: 62 MMHG | TEMPERATURE: 98.9 F | HEIGHT: 66 IN | BODY MASS INDEX: 25.07 KG/M2

## 2023-03-16 DIAGNOSIS — F51.01 PRIMARY INSOMNIA: ICD-10-CM

## 2023-03-16 DIAGNOSIS — K52.9 CHRONIC DIARRHEA: ICD-10-CM

## 2023-03-16 DIAGNOSIS — N18.31 STAGE 3A CHRONIC KIDNEY DISEASE (H): ICD-10-CM

## 2023-03-16 DIAGNOSIS — Z11.59 NEED FOR HEPATITIS C SCREENING TEST: ICD-10-CM

## 2023-03-16 DIAGNOSIS — J31.0 RHINITIS, UNSPECIFIED TYPE: ICD-10-CM

## 2023-03-16 DIAGNOSIS — Z00.00 ROUTINE GENERAL MEDICAL EXAMINATION AT A HEALTH CARE FACILITY: Primary | ICD-10-CM

## 2023-03-16 DIAGNOSIS — Z12.5 SCREENING FOR PROSTATE CANCER: ICD-10-CM

## 2023-03-16 DIAGNOSIS — I10 BENIGN ESSENTIAL HYPERTENSION: ICD-10-CM

## 2023-03-16 DIAGNOSIS — Z13.220 SCREENING FOR HYPERLIPIDEMIA: ICD-10-CM

## 2023-03-16 LAB
ANION GAP SERPL CALCULATED.3IONS-SCNC: 15 MMOL/L (ref 7–15)
BUN SERPL-MCNC: 19.5 MG/DL (ref 8–23)
CALCIUM SERPL-MCNC: 9.5 MG/DL (ref 8.8–10.2)
CHLORIDE SERPL-SCNC: 105 MMOL/L (ref 98–107)
CHOLEST SERPL-MCNC: 173 MG/DL
CREAT SERPL-MCNC: 1.12 MG/DL (ref 0.67–1.17)
DEPRECATED HCO3 PLAS-SCNC: 20 MMOL/L (ref 22–29)
GFR SERPL CREATININE-BSD FRML MDRD: 72 ML/MIN/1.73M2
GLUCOSE SERPL-MCNC: 99 MG/DL (ref 70–99)
HCV AB SERPL QL IA: NONREACTIVE
HDLC SERPL-MCNC: 59 MG/DL
LDLC SERPL CALC-MCNC: 68 MG/DL
NONHDLC SERPL-MCNC: 114 MG/DL
POTASSIUM SERPL-SCNC: 4.9 MMOL/L (ref 3.4–5.3)
PSA SERPL DL<=0.01 NG/ML-MCNC: 4.72 NG/ML (ref 0–4.5)
SODIUM SERPL-SCNC: 140 MMOL/L (ref 136–145)
TRIGL SERPL-MCNC: 231 MG/DL

## 2023-03-16 PROCEDURE — 99397 PER PM REEVAL EST PAT 65+ YR: CPT | Performed by: NURSE PRACTITIONER

## 2023-03-16 PROCEDURE — G0103 PSA SCREENING: HCPCS | Performed by: NURSE PRACTITIONER

## 2023-03-16 PROCEDURE — 86803 HEPATITIS C AB TEST: CPT | Performed by: NURSE PRACTITIONER

## 2023-03-16 PROCEDURE — 80061 LIPID PANEL: CPT | Performed by: NURSE PRACTITIONER

## 2023-03-16 PROCEDURE — 36415 COLL VENOUS BLD VENIPUNCTURE: CPT | Performed by: NURSE PRACTITIONER

## 2023-03-16 PROCEDURE — 80048 BASIC METABOLIC PNL TOTAL CA: CPT | Performed by: NURSE PRACTITIONER

## 2023-03-16 PROCEDURE — 99214 OFFICE O/P EST MOD 30 MIN: CPT | Mod: 25 | Performed by: NURSE PRACTITIONER

## 2023-03-16 RX ORDER — ZOLPIDEM TARTRATE 12.5 MG/1
TABLET, FILM COATED, EXTENDED RELEASE ORAL
Qty: 30 TABLET | Refills: 5 | Status: SHIPPED | OUTPATIENT
Start: 2023-03-16 | End: 2023-08-23

## 2023-03-16 RX ORDER — FLUTICASONE PROPIONATE 50 MCG
SPRAY, SUSPENSION (ML) NASAL
Qty: 16 G | Refills: 11 | Status: SHIPPED | OUTPATIENT
Start: 2023-03-16 | End: 2024-06-04

## 2023-03-16 RX ORDER — LOSARTAN POTASSIUM 100 MG/1
100 TABLET ORAL DAILY
Qty: 90 TABLET | Refills: 3 | Status: ON HOLD | OUTPATIENT
Start: 2023-03-16 | End: 2023-12-12

## 2023-03-16 ASSESSMENT — ENCOUNTER SYMPTOMS
HEARTBURN: 0
FREQUENCY: 1
CHILLS: 0
PALPITATIONS: 0
HEMATOCHEZIA: 0
SHORTNESS OF BREATH: 0
JOINT SWELLING: 0
NAUSEA: 0
ARTHRALGIAS: 0
DIARRHEA: 1
ABDOMINAL PAIN: 1
HEADACHES: 0
CONSTIPATION: 0
PARESTHESIAS: 0
MYALGIAS: 0
FEVER: 0
WEAKNESS: 0
DYSURIA: 0
SORE THROAT: 0
NERVOUS/ANXIOUS: 0
EYE PAIN: 0
DIZZINESS: 0
HEMATURIA: 0
COUGH: 1

## 2023-03-16 ASSESSMENT — PAIN SCALES - GENERAL: PAINLEVEL: NO PAIN (0)

## 2023-03-16 ASSESSMENT — ACTIVITIES OF DAILY LIVING (ADL): CURRENT_FUNCTION: NO ASSISTANCE NEEDED

## 2023-03-16 NOTE — PATIENT INSTRUCTIONS
Follow up with GI and the test they ordered - refer to handout        Patient Education   Personalized Prevention Plan  You are due for the preventive services outlined below.  Your care team is available to assist you in scheduling these services.  If you have already completed any of these items, please share that information with your care team to update in your medical record.  Health Maintenance Due   Topic Date Due    Hepatitis C Screening  Never done    Discuss Advance Care Planning  05/05/2020    Cholesterol Lab  06/10/2021    Annual Wellness Visit  09/28/2021    ANNUAL REVIEW OF HM ORDERS  02/25/2023     Your Health Risk Assessment indicates you feel you are not in good health    A healthy lifestyle helps keep the body fit and the mind alert. It helps protect you from disease, helps you fight disease, and helps prevent chronic disease (disease that doesn't go away) from getting worse. This is important as you get older and begin to notice twinges in muscles and joints and a decline in the strength and stamina you once took for granted. A healthy lifestyle includes good healthcare, good nutrition, weight control, recreation, and regular exercise. Avoid harmful substances and do what you can to keep safe. Another part of a healthy lifestyle is stay mentally active and socially involved.    Good healthcare   Have a wellness visit every year.   If you have new symptoms, let us know right away. Don't wait until the next checkup.   Take medicines exactly as prescribed and keep your medicines in a safe place. Tell us if your medicine causes problems.   Healthy diet and weight control   Eat 3 or 4 small, nutritious, low-fat, high-fiber meals a day. Include a variety of fruits, vegetables, and whole-grain foods.   Make sure you get enough calcium in your diet. Calcium, vitamin D, and exercise help prevent osteoporosis (bone thinning).   If you live alone, try eating with others when you can. That way you get a good  meal and have company while you eat it.   Try to keep a healthy weight. If you eat more calories than your body uses for energy, it will be stored as fat and you will gain weight.     Recreation   Recreation is not limited to sports and team events. It includes any activity that provides relaxation, interest, enjoyment, and exercise. Recreation provides an outlet for physical, mental, and social energy. It can give a sense of worth and achievement. It can help you stay healthy.    Mental Exercise and Social Involvement  Mental and emotional health is as important as physical health. Keep in touch with friends and family. Stay as active as possible. Continue to learn and challenge yourself.   Things you can do to stay mentally active are:  Learn something new, like a foreign language or musical instrument.   Play SCRABBLE or do crossword puzzles. If you cannot find people to play these games with you at home, you can play them with others on your computer through the Internet.   Join a games club--anything from card games to chess or checkers or lawn bowling.   Start a new hobby.   Go back to school.   Volunteer.   Read.   Keep up with world events.    Exercise for a Healthier Heart  You may wonder how you can improve the health of your heart. If you re thinking about exercise, you re on the right track. You don t need to become an athlete. But you do need a certain amount of brisk exercise to help strengthen your heart. If you have been diagnosed with a heart condition, your healthcare provider may advise exercise to help your condition. To help make exercise a habit, choose safe, fun activities.      Exercise with a friend. When activity is fun, you're more likely to stick with it.     Before you start  Check with your healthcare provider before starting an exercise program. This is especially important if you haven't been active for a while. It's also important if you have a long-term (chronic) health problem such  as heart disease, diabetes, or obesity. Also check with your provider if you're at high risk for having these problems.   Why exercise?  Exercising regularly offers many healthy rewards. It can help you do all of these:   Improve your blood cholesterol level to help prevent further heart trouble.  Lower your blood pressure to help prevent a stroke or heart attack.  Control diabetes or reduce your risk of getting this disease.  Improve your heart and lung function.  Reach and stay at a healthy weight.  Make your muscles stronger so you can stay active.  Prevent falls and fractures by slowing the loss of bone mass (osteoporosis).  Manage stress better.  Improve your sense of self and your body image.  Exercise tips    Ease into your routine. Set small goals. Then build on them. Talk with your healthcare provider first before starting an exercise routine if you're not sure what your activity level should be.  Exercise on most days. Aim for a total of at least 150 minutes (2 hours and 30 minutes) or more of moderate-intensity aerobic activity each week. You could also do 75 minutes (1 hour and 15 minutes) or more of vigorous-intensity aerobic activity each week. Or try for a combination of both. Moderate activity means that you breathe heavier and your heart rate increases, but you can still talk. Think about doing at least 30 minutes of moderate exercise, 5 times a week. It's OK to work up to the 30-minute period over time. Examples of moderate-intensity activity are brisk walking, gardening, and water aerobics.  Step up your daily activity level.  Along with your exercise program, try being more active the whole day. Walk instead of drive. Or park further away so that you take more steps each day. Do more household tasks or yard work. You may not be able to meet the advised amount of physical activity. But doing some moderate- or vigorous-intensity aerobic activity can help reduce your risk for heart disease. Your  healthcare provider can help you figure out what is best for you.  Choose 1 or more activities you enjoy.  Walking is one of the easiest things you can do. You can also try swimming, riding a bike, dancing, or taking an exercise class.    Call 911  Call 911 right away if any of these occur:   Chest pain that doesn't go away quickly with rest  New burning, tightness, pressure, or heaviness in your chest, neck, shoulders, back, or arms  Abnormal or severe shortness of breath  A very fast or irregular heartbeat (palpitations)  Fainting  When to call your healthcare provider  Call your healthcare provider if you have any of these:   Dizziness or lightheadedness  Mild shortness of breath or chest pain  Increased or new joint or muscle pain    [employer] last reviewed this educational content on 7/1/2022 2000-2022 [employer]. All rights reserved. This information is not intended as a substitute for professional medical care. Always follow your healthcare professional's instructions.          Understanding USDA MyPlate  The USDA has guidelines to help you make healthy food choices. These are called MyPlate. MyPlate shows the food groups that make up healthy meals using the image of a place setting. Before you eat, think about the healthiest choices for what to put on your plate or in your cup or bowl. To learn more about building a healthy plate, visit www.choosemyplate.gov.    The food groups  Fruits. Any fruit or 100% fruit juice counts as part of the Fruit Group. Fruits may be fresh, canned, frozen, or dried, and may be whole, cut-up, or pureed. Make 1/2 of your plate fruits and vegetables.  Vegetables. Any vegetable or 100% vegetable juice counts as a member of the Vegetable Group. Vegetables may be fresh, frozen, canned, or dried. They can be served raw or cooked and may be whole, cut-up, or mashed. Make 1/2 of your plate fruits and vegetables.  Grains. All foods made from grains are part of the Grains  Group. These include wheat, rice, oats, cornmeal, and barley. Grains are often used to make foods such as bread, pasta, oatmeal, cereal, tortillas, and grits. Grains should be no more than 1/4 of your plate. At least half of your grains should be whole grains.  Protein. This group includes meat, poultry, seafood, beans and peas, eggs, processed soy products (such as tofu), nuts (including nut butters), and seeds. Make protein choices no more than 1/4 of your plate. Meat and poultry choices should be lean or low fat.  Dairy. The Dairy Group includes all fluid milk products and foods made from milk that contain calcium, such as yogurt and cheese. (Foods that have little calcium, such as cream, butter, and cream cheese, are not part of this group.) Most dairy choices should be low-fat or fat-free.  Oils. Oils aren't a food group, but they do contain essential nutrients. However it's important to watch your intake of oils. These are fats that are liquid at room temperature. They include canola, corn, olive, soybean, vegetable, and sunflower oil. Foods that are mainly oil include mayonnaise, certain salad dressings, and soft margarines. You likely already get your daily oil allowance from the foods you eat.  Things to limit  Eating healthy also means limiting these things in your diet:     Salt (sodium). Many processed foods have a lot of sodium. To keep sodium intake down, eat fresh vegetables, meats, poultry, and seafood when possible. Purchase low-sodium, reduced-sodium, or no-salt-added food products at the store. And don't add salt to your meals at home. Instead, season them with herbs and spices such as dill, oregano, cumin, and paprika. Or try adding flavor with lemon or lime zest and juice.  Saturated fat. Saturated fats are most often found in animal products such as beef, pork, and chicken. They are often solid at room temperature, such as butter. To reduce your saturated fat intake, choose leaner cuts of meat  and poultry. And try healthier cooking methods such as grilling, broiling, roasting, or baking. For a simple lower-fat swap, use plain nonfat yogurt instead of mayonnaise when making potato salad or macaroni salad.  Added sugars. These are sugars added to foods. They are in foods such as ice cream, candy, soda, fruit drinks, sports drinks, energy drinks, cookies, pastries, jams, and syrups. Cut down on added sugars by sharing sweet treats with a family member or friend. You can also choose fruit for dessert, and drink water or other unsweetened beverages.     [employer] last reviewed this educational content on 6/1/2020 2000-2021 [employer]. All rights reserved. This information is not intended as a substitute for professional medical care. Always follow your healthcare professional's instructions.          Signs of Hearing Loss      Hearing much better with one ear can be a sign of hearing loss.   Hearing loss is a problem shared by many people. In fact, it is one of the most common health problems, particularly as people age. Most people age 65 and older have some hearing loss. By age 80, almost everyone does. Hearing loss often occurs slowly over the years. So you may not realize your hearing has gotten worse.  Have your hearing checked  Call your healthcare provider if you:  Have to strain to hear normal conversation  Have to watch other people s faces very carefully to follow what they re saying  Need to ask people to repeat what they ve said  Often misunderstand what people are saying  Turn the volume of the television or radio up so high that others complain  Feel that people are mumbling when they re talking to you  Find that the effort to hear leaves you feeling tired and irritated  Notice, when using the phone, that you hear better with one ear than the other  [employer] last reviewed this educational content on 1/1/2020 2000-2021 [employer]. All rights reserved. This  information is not intended as a substitute for professional medical care. Always follow your healthcare professional's instructions.          Urinary Incontinence (Male)  We understand that gender is a spectrum. We may use gendered terms to talk about anatomy and health risk. Please use this sheet in a way that works best for you and your provider as you talk about your care.     Urinary incontinence means not being able to control the release of urine from the bladder.   Causes  Common causes of urinary incontinence in men include:  Infection  Certain medicines  Aging  Poor pelvic muscle tone  Bladder spasms  Obesity  Enlarged prostate  Trouble urinating and fully emptying the bladder (urinary retention)  Other things that can cause incontinence are:   Nervous system diseases  Diabetes  Sleep apnea  Urinary tract infections  Prostate surgery  Pelvic injury  Constipation and smoking have also been identified as risk factors.   Symptoms  Urge incontinence (overactive bladder). This is a sudden urge to urinate. It occurs even though there may not be much urine in the bladder. The need to urinate often during the night is common. It's due to overactive bladder muscles.  Stress incontinence. This is urine leakage that you can't control. It can occur with sneezing, coughing, and other actions that put stress on the bladder.    Treatment  Treatment depends on what is causing the condition. Bladder infections are treated with antibiotics. Urinary retention is treated with a bladder catheter.   Home care  Follow these guidelines when caring for yourself at home:  Don't have any foods and drinks that may irritate the bladder. This includes:  Chocolate  Alcohol  Caffeine  Carbonated drinks  Acidic fruits and juices  Limit fluids to 6 to 8 cups a day.  Lose weight if you are overweight. This may reduce your symptoms.  If advised, do regular pelvic muscle-strengthening exercises such as Kegel exercises.  If needed, wear  absorbent pads to catch urine. Change the pads often. This is for good hygiene and to prevent skin and bladder infections.  Bathe daily for good hygiene.  If an antibiotic was prescribed to treat a bladder infection, take it until it's finished. Keep taking it even if you are feeling better. This is to make sure your infection has cleared.  If a catheter was left in place, keep bacteria from getting into the collection bag. Don't disconnect the catheter from the collection bag.  Use a leg band to secure the catheter drainage tube, so it does not pull on the catheter. Drain the collection bag when it becomes full. To do this, use the drain spout at the bottom of the bag. Don't disconnect the bag from the catheter.  Don't pull on or try to remove a catheter. The catheter must be removed by a healthcare provider.  If you smoke, stop. Ask your provider for help if you can't do this on your own.  Follow-up care  Follow up with your healthcare provider, or as advised.  When to get medical advice  Call your healthcare provider right away if any of these occur:   Fever over 100.4 F (38 C), or as directed by your provider  Bladder pain or fullness  Belly swelling, nausea, or vomiting  Back pain  Weakness, dizziness, or fainting  If a catheter was left in place, return if:  The catheter falls out  The catheter stops draining for 6 hours  Your urine gets cloudy or smells bad  [employer] last reviewed this educational content on 6/1/2022 2000-2022 [employer]. All rights reserved. This information is not intended as a substitute for professional medical care. Always follow your healthcare professional's instructions.        Your Health Risk Assessment indicates you feel you are not in good emotional health.    Recreation   Recreation is not limited to sports and team events. It includes any activity that provides relaxation, interest, enjoyment, and exercise. Recreation provides an outlet for physical, mental,  and social energy. It can give a sense of worth and achievement. It can help you stay healthy.    Mental Exercise and Social Involvement  Mental and emotional health is as important as physical health. Keep in touch with friends and family. Stay as active as possible. Continue to learn and challenge yourself.   Things you can do to stay mentally active are:  Learn something new, like a foreign language or musical instrument.   Play SCRABBLE or do crossword puzzles. If you cannot find people to play these games with you at home, you can play them with others on your computer through the Internet.   Join a games club--anything from card games to chess or checkers or lawn bowling.   Start a new hobby.   Go back to school.   Volunteer.   Read.   Keep up with world events.

## 2023-03-16 NOTE — PROGRESS NOTES
The patient was provided with suggestions to help him develop a healthy physical lifestyle.  He is at risk for lack of exercise and has been provided with information to increase physical activity for the benefit of his well-being.  The patient was counseled and encouraged to consider modifying their diet and eating habits. He was provided with information on recommended healthy diet options.  The patient was provided with written information regarding signs of hearing loss.  Information on urinary incontinence and treatment options given to patient.  The patient was provided with suggestions to help him develop a healthy emotional lifestyle.

## 2023-03-16 NOTE — LETTER
[facility]  [address] [id number]  [phone]          March 16, 2023    RE:  Donnie Ernandez                                                                                                                                                       [address] [place] [address]            To whom it may concern:    Donnie Ernandez is under my professional care. Please excuse him from work for medical reasons: 3/13, 3/14, 3/15.      Sincerely,          Mae Jones, CNP

## 2023-03-16 NOTE — PROGRESS NOTES
"SUBJECTIVE:   Donnie is a 67 year old who presents for Preventive Visit.    Patient has been advised of split billing requirements and indicates understanding: Yes  Are you in the first 12 months of your Medicare coverage?  No  Patient is not on medicare yet.    Healthy Habits:     In general, how would you rate your overall health?  Fair    Frequency of exercise:  None    Do you usually eat at least 4 servings of fruit and vegetables a day, include whole grains    & fiber and avoid regularly eating high fat or \"junk\" foods?  No    Taking medications regularly:  Yes    Medication side effects:  None    Ability to successfully perform activities of daily living:  No assistance needed    Home Safety:  Lack of grab bars in the bathroom    Hearing Impairment:  Need to ask people to speak up or repeat themselves    In the past 6 months, have you been bothered by leaking of urine? Yes    In general, how would you rate your overall mental or emotional health?  Fair      PHQ-2 Total Score: 0    Additional concerns today:  Yes      Have you ever done Advance Care Planning? (For example, a Health Directive, POLST, or a discussion with a medical provider or your loved ones about your wishes): No, advance care planning information given to patient to review.  Advanced care planning was discussed at today's visit.       Fall risk  Fallen 2 or more times in the past year?: No  Any fall with injury in the past year?: No    Cognitive Screening   1) Repeat 3 items (Leader, Season, Table)    2) Clock draw: NORMAL  3) 3 item recall: Recalls 2 objects   Results: NORMAL clock, 1-2 items recalled: COGNITIVE IMPAIRMENT LESS LIKELY    Mini-CogTM Copyright SRINI Collier. Licensed by the author for use in [facility]; reprinted with permission (bowen@.[email or web address]). All rights reserved.          Reviewed and updated as needed this visit by clinical staff   Tobacco  Allergies  Meds  Problems  Med Hx  Surg Hx  Fam Hx          Reviewed and " updated as needed this visit by Provider   Tobacco  Allergies  Meds  Problems  Med Hx  Surg Hx  Fam Hx         Social History     Tobacco Use     Smoking status: Never     Smokeless tobacco: Never   Substance Use Topics     Alcohol use: Yes     Comment: 6 drinks per week         Alcohol Use 3/16/2023   Prescreen: >3 drinks/day or >7 drinks/week? No       PROBLEMS TO ADD ON...      Patient reports he has been sick for about a week  A lot of people at work are sick, unknown diagnosis.  Now wife is getting sick too  Needs a note for work-  Missed 3/13,14,15      Chronic diarrhea issue:  For years  Has been worked up with imaging, labs and colonoscopy  Comes and goes  Fiber - didn't do anything.  Imodium - somewhat controls symptoms.  Had a virtual visit with GI in April 2022 - didn't follow up on recs        Hypertension Follow-up    Discuss dose of metoprolol  Another provider increased to 100mg daily;  He is still taking 50mg BID      Do you check your blood pressure regularly outside of the clinic? Yes - once in a while    Are you following a low salt diet? Yes    Are your blood pressures ever more than 140 on the top number (systolic) OR more   than 90 on the bottom number (diastolic), for example 140/90?  Not sure, doesn't write it down.    Medication Followup of ambien for insomnia  flonase for allergies    Taking Medication as prescribed: yes    Side Effects:  None    Medication Helping Symptoms:  yes       Chronic Kidney Disease Follow-up      Do you take any over the counter pain medicine?: No             Current providers sharing in care for this patient include:   Patient Care Team:  Mae Jones APRN CNP as PCP - General (Family Practice)  Vivek Berrios MD as Assigned Gastroenterology Provider  Jacquelyn Manzanares DO as Assigned PCP    The following health maintenance items are reviewed in Epic and correct as of today:  Health Maintenance   Topic Date Due     HEPATITIS C SCREENING   "Never done     LIPID  06/10/2021     MEDICARE ANNUAL WELLNESS VISIT  09/28/2021     INFLUENZA VACCINE (1) 06/30/2023 (Originally 9/1/2022)     DTAP/TDAP/TD IMMUNIZATION (2 - Td or Tdap) 09/16/2023 (Originally 5/3/2021)     Pneumococcal Vaccine: 65+ Years (2 - PCV) 03/16/2024 (Originally 10/21/2010)     ZOSTER IMMUNIZATION (1 of 2) 03/16/2024 (Originally 9/28/2006)     BMP  12/13/2023     MICROALBUMIN  12/13/2023     HEMOGLOBIN  12/13/2023     ANNUAL REVIEW OF HM ORDERS  03/16/2024     FALL RISK ASSESSMENT  03/16/2024     COLORECTAL CANCER SCREENING  04/30/2026     ADVANCE CARE PLANNING  03/16/2028     PHQ-2 (once per calendar year)  Completed     URINALYSIS  Completed     AORTIC ANEURYSM SCREENING (SYSTEM ASSIGNED)  Completed     COVID-19 Vaccine  Completed     IPV IMMUNIZATION  Aged Out     MENINGITIS IMMUNIZATION  Aged Out               Review of Systems   Constitutional: Negative for chills and fever.   HENT: Positive for congestion. Negative for ear pain, hearing loss and sore throat.    Eyes: Negative for pain and visual disturbance.   Respiratory: Positive for cough. Negative for shortness of breath.    Cardiovascular: Negative for chest pain, palpitations and peripheral edema.   Gastrointestinal: Positive for abdominal pain and diarrhea. Negative for constipation, heartburn, hematochezia and nausea.   Genitourinary: Positive for frequency. Negative for dysuria, genital sores, hematuria, impotence, penile discharge and urgency.   Musculoskeletal: Negative for arthralgias, joint swelling and myalgias.   Skin: Negative for rash.   Neurological: Negative for dizziness, weakness, headaches and paresthesias.   Psychiatric/Behavioral: Negative for mood changes. The patient is not nervous/anxious.          OBJECTIVE:   /62 (BP Location: Right arm)   Pulse 68   Temp 98.9  F (37.2  C) (Tympanic)   Resp 16   Ht 1.664 m (5' 5.5\")   Wt 70.8 kg (156 lb)   SpO2 98%   BMI 25.56 kg/m   Estimated body mass index " "is 25.56 kg/m  as calculated from the following:    Height as of this encounter: 1.664 m (5' 5.5\").    Weight as of this encounter: 70.8 kg (156 lb).  Physical Exam  GENERAL: healthy, alert and no distress  EYES: Eyes grossly normal to inspection, PERRL and conjunctivae and sclerae normal  HENT: ear canals and TM's normal, nose and mouth without ulcers or lesions  NECK: no adenopathy, no asymmetry, masses, or scars and thyroid normal to palpation  RESP: lungs clear to auscultation - no rales, rhonchi or wheezes  CV: regular rate and rhythm, normal S1 S2, no S3 or S4, no murmur, click or rub, no peripheral edema and peripheral pulses strong  MS: no gross musculoskeletal defects noted, no edema  NEURO: Normal strength and tone, mentation intact and speech normal  PSYCH: mentation appears normal, affect normal/bright        ASSESSMENT / PLAN:       ICD-10-CM    1. Routine general medical examination at a health care facility  Z00.00       2. Stage 3a chronic kidney disease (H)  N18.31 Recheck BMP today  OFFICE/OUTPT VISIT,EST,LEVL III      3. Benign essential hypertension  I10 Well controlled.  losartan (COZAAR) 100 MG tablet     Basic metabolic panel  (Ca, Cl, CO2, Creat, Gluc, K, Na, BUN)     OFFICE/OUTPT VISIT,EST,LEVL III     Basic metabolic panel  (Ca, Cl, CO2, Creat, Gluc, K, Na, BUN)     CANCELED: OFFICE/OUTPT VISIT,EST,LEVL III      4. Chronic diarrhea  K52.9 Issues for issues.  Has had an extensive work up.  Encouraged patient to follow up on GI recs - reprinted the AVS from that visit for the patient.  OFFICE/OUTPT VISIT,EST,LEVL III      5. Primary insomnia  F51.01 Stable   zolpidem ER (AMBIEN CR) 12.5 MG CR tablet     OFFICE/OUTPT VISIT,EST,LEVL III     CANCELED: OFFICE/OUTPT VISIT,EST,LEVL III      6. Rhinitis, unspecified type  J31.0 Intermittent issue  fluticasone (FLONASE) 50 MCG/ACT nasal spray     OFFICE/OUTPT VISIT,EST,LEVL III     CANCELED: OFFICE/OUTPT VISIT,EST,LEVL III      7. Need for hepatitis " C screening test  Z11.59 Hepatitis C Screen Reflex to HCV RNA Quant and Genotype     Hepatitis C Screen Reflex to HCV RNA Quant and Genotype      8. Screening for hyperlipidemia  Z13.220 Lipid panel reflex to direct LDL Non-fasting     Lipid panel reflex to direct LDL Non-fasting      9. Screening for prostate cancer  Z12.5 PSA, screen     PSA, screen              Patient has been advised of split billing requirements and indicates understanding: Yes by AFR and CMA      COUNSELING:  Reviewed preventive health counseling, as reflected in patient instructions        He reports that he has never smoked. He has never used smokeless tobacco.      Appropriate preventive services were discussed with this patient, including applicable screening as appropriate for cardiovascular disease, diabetes, osteopenia/osteoporosis, and glaucoma.  As appropriate for age/gender, discussed screening for colorectal cancer, prostate cancer, breast cancer, and cervical cancer. Checklist reviewing preventive services available has been given to the patient.    Reviewed patients plan of care and provided an AVS. The Basic Care Plan (routine screening as documented in Health Maintenance) for Donnie meets the Care Plan requirement. This Care Plan has been established and reviewed with the Patient.      The risks, benefits and treatment options of prescribed medications or other treatments have been discussed with the patient. The patient verbalized their understanding and should call or follow up if no improvement or if they develop further problems.    MAYANK Lutz [facility]    Identified Health Risks:

## 2023-03-16 NOTE — LETTER
March 17, 2023      Donnie Ernandez  [address] [place] [address]        Dear ,    We are writing to inform you of your test results.    Test results indicate you may require additional follow up, see comment below.    Overall your cholesterol is good.  Your triglycerides are high. Aerobic exercise will help lower this, as well as weight loss. You should follow a low fat diet, limiting refined sugars, fruit juices, and high fructose beverages; we suggest increased consumption of fish that contain high amounts of omega-3 fatty acids.     Your kidney function and electrolytes are normal.     Hepatitis C screening test was negative.     Your PSA (prostate cancer screening test) was minimally elevated.   I recommend that you return to lab in 1 month to have this rechecked.       Resulted Orders   Hepatitis C Screen Reflex to HCV RNA Quant and Genotype   Result Value Ref Range    Hepatitis C Antibody Nonreactive Nonreactive    Narrative    Assay performance characteristics have not been established for newborns, infants, and children.   Lipid panel reflex to direct LDL Non-fasting   Result Value Ref Range    Cholesterol 173 <200 mg/dL    Triglycerides 231 (H) <150 mg/dL    Direct Measure HDL 59 >=40 mg/dL    LDL Cholesterol Calculated 68 <=100 mg/dL    Non HDL Cholesterol 114 <130 mg/dL    Narrative    Cholesterol  Desirable:  <200 mg/dL    Triglycerides  Normal:  Less than 150 mg/dL  Borderline High:  150-199 mg/dL  High:  200-499 mg/dL  Very High:  Greater than or equal to 500 mg/dL    Direct Measure HDL  Female:  Greater than or equal to 50 mg/dL   Male:  Greater than or equal to 40 mg/dL    LDL Cholesterol  Desirable:  <100mg/dL  Above Desirable:  100-129 mg/dL   Borderline High:  130-159 mg/dL   High:  160-189 mg/dL   Very High:  >= 190 mg/dL    Non HDL Cholesterol  Desirable:  130 mg/dL  Above Desirable:  130-159 mg/dL  Borderline High:  160-189 mg/dL  High:  190-219 mg/dL  Very High:  Greater  than or equal to 220 mg/dL   Basic metabolic panel  (Ca, Cl, CO2, Creat, Gluc, K, Na, BUN)   Result Value Ref Range    Sodium 140 136 - 145 mmol/L    Potassium 4.9 3.4 - 5.3 mmol/L    Chloride 105 98 - 107 mmol/L    Carbon Dioxide (CO2) 20 (L) 22 - 29 mmol/L    Anion Gap 15 7 - 15 mmol/L    Urea Nitrogen 19.5 8.0 - 23.0 mg/dL    Creatinine 1.12 0.67 - 1.17 mg/dL    Calcium 9.5 8.8 - 10.2 mg/dL    Glucose 99 70 - 99 mg/dL    GFR Estimate 72 >60 mL/min/1.73m2      Comment:      eGFR calculated using 2021 CKD-EPI equation.   PSA, screen   Result Value Ref Range    Prostate Specific Antigen Screen 4.72 (H) 0.00 - 4.50 ng/mL    Narrative    This result is obtained using the Roche Elecsys total PSA method on the jann [id number] immunoassay analyzer. Results obtained with different assay methods or kits cannot be used interchangeably.       If you have any questions or concerns, please call the clinic at the number listed above.       Sincerely,      MAYANK Lutz CNP

## 2023-03-17 DIAGNOSIS — R97.20 ELEVATED PROSTATE SPECIFIC ANTIGEN (PSA): Primary | ICD-10-CM

## 2023-04-26 ENCOUNTER — OFFICE VISIT (OUTPATIENT)
Dept: FAMILY MEDICINE | Facility: CLINIC | Age: 68
End: 2023-04-26
Payer: COMMERCIAL

## 2023-04-26 VITALS
SYSTOLIC BLOOD PRESSURE: 122 MMHG | BODY MASS INDEX: 25.09 KG/M2 | DIASTOLIC BLOOD PRESSURE: 60 MMHG | RESPIRATION RATE: 16 BRPM | HEIGHT: 66 IN | OXYGEN SATURATION: 95 % | HEART RATE: 86 BPM | TEMPERATURE: 99.9 F | WEIGHT: 156.09 LBS

## 2023-04-26 DIAGNOSIS — J06.9 VIRAL URI WITH COUGH: Primary | ICD-10-CM

## 2023-04-26 LAB
FLUAV AG SPEC QL IA: NEGATIVE
FLUBV AG SPEC QL IA: NEGATIVE
SARS-COV-2 RNA RESP QL NAA+PROBE: NEGATIVE

## 2023-04-26 PROCEDURE — 99213 OFFICE O/P EST LOW 20 MIN: CPT | Mod: CS | Performed by: NURSE PRACTITIONER

## 2023-04-26 PROCEDURE — 87804 INFLUENZA ASSAY W/OPTIC: CPT | Performed by: NURSE PRACTITIONER

## 2023-04-26 PROCEDURE — U0005 INFEC AGEN DETEC AMPLI PROBE: HCPCS | Performed by: NURSE PRACTITIONER

## 2023-04-26 PROCEDURE — U0003 INFECTIOUS AGENT DETECTION BY NUCLEIC ACID (DNA OR RNA); SEVERE ACUTE RESPIRATORY SYNDROME CORONAVIRUS 2 (SARS-COV-2) (CORONAVIRUS DISEASE [COVID-19]), AMPLIFIED PROBE TECHNIQUE, MAKING USE OF HIGH THROUGHPUT TECHNOLOGIES AS DESCRIBED BY CMS-2020-01-R: HCPCS | Performed by: NURSE PRACTITIONER

## 2023-04-26 RX ORDER — BENZONATATE 100 MG/1
100 CAPSULE ORAL 3 TIMES DAILY PRN
Qty: 21 CAPSULE | Refills: 0 | Status: SHIPPED | OUTPATIENT
Start: 2023-04-26 | End: 2023-12-20

## 2023-04-26 ASSESSMENT — PAIN SCALES - GENERAL: PAINLEVEL: MODERATE PAIN (5)

## 2023-04-26 NOTE — PROGRESS NOTES
"  Assessment & Plan     Viral URI with cough  -labs negative for Covid and influenza, lungs clear, recommended symptomatic treatment, if no improvement and patient continue to have cough in 5-7 days will consider chest Xray   - Symptomatic COVID-19 Virus (Coronavirus) by PCR Nose  - Influenza A & B Antigen  - benzonatate (TESSALON) 100 MG capsule; Take 1 capsule (100 mg) by mouth 3 times daily as needed for cough        MAYANK Chaudhari CNP  [facility]    Russ Fields is a 67 year old, presenting for the following health issues:  Cough (Have not test for covid //Would like a note for work )        4/26/2023     9:21 AM   Additional Questions   Roomed by ellie phillip   Accompanied by self     HPI      Chief Complaint   Patient presents with     Cough     Have not test for covid     Would like a note for work      Acute Illness  Acute illness concerns: cough  Onset/Duration: 4/24/23  Symptoms:  Fever: YES- 100.1  Chills/Sweats: No  Headache (location?): YES  Sinus Pressure: No  Conjunctivitis:  No  Ear Pain: no  Rhinorrhea: YES  Congestion: No  Sore Throat: No  Cough: YES-non-productive  Wheeze: YES  Decreased Appetite: YES  Nausea: No  Vomiting: No  Diarrhea: always have this  Dysuria/Freq.: No  Dysuria or Hematuria: No  Fatigue/Achiness: YES- back hurts  Sick/Strep Exposure: YES- wife one week ago cough got zpak  Therapies tried and outcome: None      Review of Systems   Constitutional, HEENT, cardiovascular, pulmonary, gi and gu systems are negative, except as otherwise noted.      Objective    /60 (BP Location: Left arm, Patient Position: Sitting, Cuff Size: Adult Regular)   Pulse 86   Temp 99.9  F (37.7  C) (Tympanic)   Resp 16   Ht 1.664 m (5' 5.51\")   Wt 70.8 kg (156 lb 1.4 oz)   SpO2 95%   BMI 25.57 kg/m    Body mass index is 25.57 kg/m .  Physical Exam   GENERAL: healthy, alert and no distress  EYES: Eyes grossly normal to inspection, PERRL and conjunctivae and " sclerae normal  HENT: ear canals and TM's normal, nose and mouth without ulcers or lesions  NECK: no adenopathy, no asymmetry, masses, or scars and thyroid normal to palpation  RESP: lungs clear to auscultation - no rales, rhonchi or wheezes  CV: regular rate and rhythm, normal S1 S2, no S3 or S4, no murmur, click or rub, no peripheral edema and peripheral pulses strong  NEURO: Normal strength and tone, mentation intact and speech normal  PSYCH: mentation appears normal, affect normal/bright

## 2023-04-26 NOTE — PATIENT INSTRUCTIONS
Tessalon 100 mg 3 times daily as needed for dry cough    Can also take Mucinex , or Robitussin   Covid and influenza pending     Off work until May 1 st     Rest, drink more fluids    Tylenol 500 mg 4 times daily as needed for fevers

## 2023-04-26 NOTE — LETTER
April 26, 2023      Donnie Ernandez  [address] [place], [place] [address] [address]        To Whom It May Concern:    Donnie Ernandez was seen in our clinic. He may return to work on May 1st if feeling well.     Sincerely,        MAYANK Chaudhari CNP

## 2023-05-12 ENCOUNTER — LAB (OUTPATIENT)
Dept: LAB | Facility: CLINIC | Age: 68
End: 2023-05-12
Payer: COMMERCIAL

## 2023-05-12 DIAGNOSIS — R97.20 ELEVATED PROSTATE SPECIFIC ANTIGEN (PSA): ICD-10-CM

## 2023-05-12 LAB — PSA SERPL DL<=0.01 NG/ML-MCNC: 4.63 NG/ML (ref 0–4.5)

## 2023-05-12 PROCEDURE — G0103 PSA SCREENING: HCPCS

## 2023-05-12 PROCEDURE — 36415 COLL VENOUS BLD VENIPUNCTURE: CPT

## 2023-08-23 DIAGNOSIS — F51.01 PRIMARY INSOMNIA: ICD-10-CM

## 2023-08-23 RX ORDER — ZOLPIDEM TARTRATE 12.5 MG/1
TABLET, FILM COATED, EXTENDED RELEASE ORAL
Qty: 30 TABLET | Refills: 5 | Status: SHIPPED | OUTPATIENT
Start: 2023-08-23 | End: 2024-01-26

## 2023-10-04 ENCOUNTER — TRANSFERRED RECORDS (OUTPATIENT)
Dept: HEALTH INFORMATION MANAGEMENT | Facility: CLINIC | Age: 68
End: 2023-10-04
Payer: COMMERCIAL

## 2023-11-01 ENCOUNTER — VIRTUAL VISIT (OUTPATIENT)
Dept: FAMILY MEDICINE | Facility: CLINIC | Age: 68
End: 2023-11-01
Payer: COMMERCIAL

## 2023-11-01 ENCOUNTER — TELEPHONE (OUTPATIENT)
Dept: FAMILY MEDICINE | Facility: CLINIC | Age: 68
End: 2023-11-01

## 2023-11-01 DIAGNOSIS — U07.1 INFECTION DUE TO 2019 NOVEL CORONAVIRUS: Primary | ICD-10-CM

## 2023-11-01 PROCEDURE — 99442 PR PHYSICIAN TELEPHONE EVALUATION 11-20 MIN: CPT | Mod: 93 | Performed by: INTERNAL MEDICINE

## 2023-11-01 NOTE — LETTER
Dear Donnie,      Based on your responses, you may have COVID-19.     Will I be tested for COVID-19?  We would like to test you for COVID. I have placed orders for this test.     To schedule: go to your [employer] home page and scroll down to the section that says  You have an appointment that needs to be scheduled  and click the large green button that says  Schedule Now  and follow the steps to find the next available openings.    If you are unable to complete these [employer] scheduling steps, please call [phone] to schedule your testing.     How do I self-isolate?  You isolate when you have symptoms of COVID or a test shows you have COVID, even if you don t have symptoms.   If you DO have symptoms:  Stay home and away from others  For at least 5 days after your symptoms started, AND   You are fever free for 24 hours (with no medicine that reduces fever), AND  Your other symptoms are better.  Wear a mask for 10 full days any time you are around others.  If you DON T have symptoms:  Stay at home and away from others for at least 5 days after your positive test.  Wear a mask for 10 full days any time you are around others.    How can I take care of myself?  Over the counter medications may help with your symptoms such as runny or stuffy nose, cough, chills, or fever.  Talk to your care team about your options.     Some people are at high risk of severe illness (for example, you have a weak immune system, you re 50 years or older, or you have certain medical problems). If your risk is high and your symptoms started in the last 5 days, we strongly recommend for you to get COVID treatment as soon as possible.There are safe and effective medicines available that can make you feel better faster, and prevent hospitalization and death.       To discuss COVID treatment you can:  Call your clinic OR [phone] ([phone]) and ask to speak to a nurse about a positive COVID test.  Send a [employer] message to your care  "team. In [employer], select \"Ask a Medical Question\" then \"Do I need an appointment\" and put \"COVID\" in the subject line. Please include a phone number to call you back in the message.             Get lots of rest. Drink extra fluids (unless a doctor has told you not to)  Take Tylenol (acetaminophen) or ibuprofen for fever or pain. If you have liver or kidney problems, ask your family doctor if it's okay to take Tylenol or ibuprofen  Take over the counter medications for your symptoms, as directed by your doctor. You may also talk to your pharmacist.    If you have other health problems (like cancer, heart failure, an organ transplant or severe kidney disease): Call your specialty clinic if you don't feel better in the next 2 days.  Know when to call 911. Emergency warning signs include:  Trouble breathing or shortness of breath  Pain or pressure in the chest that doesn't go away  Feeling confused like you haven't felt before, or not being able to wake up  Bluish-colored lips or face    Where can I get more information?  [facility] - About COVID-19: [email or web address]   CDC - What to Do If You're Sick: https://www.cdc.gov/coronavirus/2019-ncov/if-you-are-sick/index.html   CDC -  Isolation https://www.cdc.gov/coronavirus/2019-ncov/your-health/isolation.html  "

## 2023-11-01 NOTE — PROGRESS NOTES
"Donnie is a 68 year old who is being evaluated via a billable telephone visit.      What phone number would you like to be contacted at? [phone]  How would you like to obtain your AVS? Kylie    Distant Location (provider location):  Off-site    Assessment & Plan   Problem List Items Addressed This Visit    None  Visit Diagnoses       Infection due to 2019 novel coronavirus    -  Primary    Relevant Medications    nirmatrelvir and ritonavir (PAXLOVID) 300 mg/100 mg therapy pack                     Patient Instructions   Order for paxlovid  Discussed drug interaction with ambien - can make ambien more powerful - you may feel a bit more groggy the next day  Watch for bad taste, nausea or diarrhea      How do I self-isolate?  You isolate when you have symptoms of COVID or a test shows you have COVID, even if you don t have symptoms.   If you DO have symptoms:  Stay home and away from others  For at least 5 days after your symptoms started, AND   You are fever free for 24 hours (with no medicine that reduces fever), AND  Your other symptoms are better.  Wear a mask for 10 full days any time you are around others.  If you DON T have symptoms:  Stay at home and away from others for at least 5 days after your positive test.  Wear a mask for 10 full days any time you are around others.  COVID-19 positive patient.  Encounter for consideration of medication intervention. Patient does qualify for a prescription. Full discussion with patient including medication options, risks and benefits. Potential drug interactions reviewed with patient.     Treatment Planned Paxlovid, Rx sent to [place] [facility]  [facility]  Temporary change to home medications: watch for side effects of ambien, skip if groggy      Estimated body mass index is 25.57 kg/m  as calculated from the following:    Height as of 4/26/23: 1.664 m (5' 5.51\").    Weight as of 4/26/23: 70.8 kg (156 lb 1.4 oz).  GFR Estimate   Date Value Ref Range Status   03/16/2023 " 72 >60 mL/min/1.73m2 Final     Comment:     eGFR calculated using 2021 CKD-EPI equation.   03/31/2021 58 (L) >60 mL/min/[1.73_m2] Final     Comment:     Non  GFR Calc  Starting 12/18/2018, serum creatinine based estimated GFR (eGFR) will be   calculated using the Chronic Kidney Disease Epidemiology Collaboration   (CKD-EPI) equation.       Lab Results   Component Value Date    [id number] Negative 04/26/2023       Jacquelyn Manzanares DO  [facility] is a 68 year old, presenting for the following health issues:  Covid Concern      11/1/2023     2:30 PM   Additional Questions   Roomed by Juanita SOLIZ   Accompanied by self         11/1/2023     2:30 PM   Patient Reported Additional Medications   Patient reports taking the following new medications no new meds     HPI     COVID-19 Symptom Review  How many days ago did these symptoms start? Started today 11/1/23, took 2 positive test this morning     Are any of the following symptoms significant for you?  New or worsening difficulty breathing? No  Worsening cough? Yes, it's a dry cough.   Fever or chills? Yes, I felt feverish or had chills. Temp is normal  Headache: No  Sore throat: YES- scratchy   Chest pain: No  Diarrhea: YES  Body aches? No    What treatments has patient tried? none   Does patient live in a nursing home, group home, or shelter? No  Does patient have a way to get food/medications during quarantined? Yes, I have a friend or family member who can help me.        --he has had 5 COVID vaccines;  he has had COVID before vaccines were available - flu like illness  --symptoms of sore throat, nasal congestion, rhinorrhea, fatigue, diarrhea, mild cough; no shortness of breath  --he has chronic diarrhea      Current Outpatient Medications   Medication Sig Dispense Refill    Acetaminophen (TYLENOL EXTRA STRENGTH PO) Take 1,000 mg by mouth daily as needed       Ascorbic Acid (VITAMIN C PO)       benzonatate  (TESSALON) 100 MG capsule Take 1 capsule (100 mg) by mouth 3 times daily as needed for cough 21 capsule 0    fluticasone (FLONASE) 50 MCG/ACT nasal spray SPRAY 1 SPRAY IN EACH NOSTRIL TWO TIMES A DAY PRN 16 g 11    losartan (COZAAR) 100 MG tablet Take 1 tablet (100 mg) by mouth daily 90 tablet 3    metoprolol succinate ER (TOPROL XL) 100 MG 24 hr tablet Take 1 tablet (100 mg) by mouth daily (Patient taking differently: Take 100 mg by mouth daily Still taking 50mg daily;) 90 tablet 3    VITAMIN D PO       zolpidem ER (AMBIEN CR) 12.5 MG CR tablet Take 1 tablet (12.5 mg) by mouth nightly as needed for sleep 30 tablet 5           Review of Systems   CONSTITUTIONAL: NEGATIVE for fever, chills, change in weight  ENT/MOUTH: NEGATIVE for ear, mouth and throat problems  RESP: NEGATIVE for significant cough or SOB  CV: NEGATIVE for chest pain, palpitations or peripheral edema      Objective           Vitals:  No vitals were obtained today due to virtual visit.    Physical Exam   alert, no distress, and fatigued  PSYCH: Alert and oriented times 3; coherent speech, normal   rate and volume, able to articulate logical thoughts, able   to abstract reason, no tangential thoughts, no hallucinations   or delusions  His affect is normal  RESP: No cough, no audible wheezing, able to talk in full sentences  Remainder of exam unable to be completed due to telephone visits                Phone call duration: 12 minutes

## 2023-11-01 NOTE — PATIENT INSTRUCTIONS
Order for paxlovid  Discussed drug interaction with ambien - can make ambien more powerful - you may feel a bit more groggy the next day  Watch for bad taste, nausea or diarrhea      How do I self-isolate?  You isolate when you have symptoms of COVID or a test shows you have COVID, even if you don t have symptoms.   If you DO have symptoms:  Stay home and away from others  For at least 5 days after your symptoms started, AND   You are fever free for 24 hours (with no medicine that reduces fever), AND  Your other symptoms are better.  Wear a mask for 10 full days any time you are around others.  If you DON T have symptoms:  Stay at home and away from others for at least 5 days after your positive test.  Wear a mask for 10 full days any time you are around others.

## 2023-11-01 NOTE — TELEPHONE ENCOUNTER
RN COVID TREATMENT VISIT  11/01/23      The patient has been triaged and does not require a higher level of care.    Donnie Ernandez  68 year old  Current weight? 156    Has the patient been seen by a primary care provider at an [facility] within the past two years? Yes.   Have you been in close proximity to/do you have a known exposure to a person with a confirmed case of influenza? No.     General treatment eligibility:  Date of positive COVID test (PCR or at home)?  11/1/23    Are you or have you been hospitalized for this COVID-19 infection? No.   Have you received monoclonal antibodies or antiviral treatment for COVID-19 since this positive test? No.   Do you have any of the following conditions that place you at risk of being very sick from COVID-19?   - Age 50 years or older  - Current or former smoker  Yes, patient has at least one high risk condition as noted above.     Current COVID symptoms:   - fever or chills  - cough  - muscle or body aches  - sore throat  - congestion or runny nose  - diarrhea  Yes. Patient has at least one symptom as selected.     How many days since symptoms started? 5 days or less. Established patient, 12 years or older weighing at least 88.2 lbs, who has symptoms that started in the past 5 days, has not been hospitalized nor received treatment already, and is at risk for being very sick from COVID-19.     Treatment eligibility by RN:  Are you currently pregnant or nursing? No  Do you have a clinically significant hypersensitivity to nirmatrelvir or ritonavir, or toxic epidermal necrolysis (TEN) or Lawrence-Alexey Syndrome? No  Do you have a history of hepatitis, any hepatic impairment on the Problem List (such as Child-Bustillo Class C, cirrhosis, fatty liver disease, alcoholic liver disease), or was the last liver lab (hepatic panel, ALT, AST, ALK Phos, bilirubin) elevated in the past 6 months? No  Do you have any history of severe renal  impairment (eGFR < 30mL/min)? No    Is patient eligible to continue? Yes, patient meets all eligibility requirements for the RN COVID treatment (as denoted by all no responses above).     Current Outpatient Medications   Medication Sig Dispense Refill    Acetaminophen (TYLENOL EXTRA STRENGTH PO) Take 1,000 mg by mouth daily as needed       Ascorbic Acid (VITAMIN C PO)       benzonatate (TESSALON) 100 MG capsule Take 1 capsule (100 mg) by mouth 3 times daily as needed for cough 21 capsule 0    fluticasone (FLONASE) 50 MCG/ACT nasal spray SPRAY 1 SPRAY IN EACH NOSTRIL TWO TIMES A DAY PRN 16 g 11    losartan (COZAAR) 100 MG tablet Take 1 tablet (100 mg) by mouth daily 90 tablet 3    metoprolol succinate ER (TOPROL XL) 100 MG 24 hr tablet Take 1 tablet (100 mg) by mouth daily (Patient taking differently: Take 100 mg by mouth daily Still taking 50mg daily;) 90 tablet 3    VITAMIN D PO       zolpidem ER (AMBIEN CR) 12.5 MG CR tablet Take 1 tablet (12.5 mg) by mouth nightly as needed for sleep 30 tablet 5       Medications from List 1 of the standing order (on medications that exclude the use of Paxlovid) that patient is taking: NONE. Is patient taking [place]'s Wort? No  Is patient taking [place]'s Wort or any meds from List 1? No.   Medications from List 2 of the standing order (on meds that provider needs to adjust) that patient is taking: NONE. Is patient on any of the meds from List 2? No.   Medications from List 3 of standing order (on meds that a RN needs to adjust) that patient is taking: zolpidem (Ambien, Intermezzo, Edluar): Is patient taking as needed and less than daily? No, patient is taking daily or does not feel comfortable stopping medication and instructed patient to see a provider for COVID treatment options. Patient will be transferred to a  at the end of this call. Is patient on any meds from List 3? Yes. Patient is on at least one med that needs a provider visit and will be scheduled or  transferred to a  at the end of this call.     *pt was scheduled for VV with Dr Manzanares at 430 today.    Stephanie Orourke RN

## 2023-11-01 NOTE — LETTER
November 1, 2023      Donnie Ernandez  [address] [place], [place] [address] [address]        To Whom It May Concern,       Donnie has tested positive for COVID;  he can return to work on 11/6/23.  From 11/6/23 - 11/11/23 he should wear a mask.          Sincerely,        Jacquelyn Manzanares, DO

## 2023-11-01 NOTE — TELEPHONE ENCOUNTER
COVID Positive/Requesting COVID treatment    Patient is positive for COVID and requesting treatment options.    Date of positive COVID test (PCR or at home)? Home test 11/1/23  Current COVID symptoms: fever or chills, cough, shortness of breath or difficulty breathing, fatigue, muscle or body aches, sore throat, congestion or runny nose, nausea or vomiting, and diarrhea  Date COVID symptoms began: 10/31/23    Message should be routed to clinic RN pool. Best phone number to use for call back: [phone]

## 2023-11-05 ENCOUNTER — NURSE TRIAGE (OUTPATIENT)
Dept: NURSING | Facility: CLINIC | Age: 68
End: 2023-11-05
Payer: COMMERCIAL

## 2023-11-05 NOTE — TELEPHONE ENCOUNTER
Pt calling with medication question;    Tested positive and began taking Paxlovid on 11/1/23  Making him feel very dizzy  Diarrhea - was up in bathroom at least a dozen times during last night  Took day dose for today   Has one dose left for tonight and last day dose for tomorrow  States covid symptoms are much improved since day of onset  Temp, 98.2 at time of this call    Denies;  Difficulty breathing/SOB  Body aches  Difficulty breathing     Pt is wondering if above symptoms are caused by Paxlovid and if he can discontinue taking.    Page sent to [facility] Provider, Dr. Fraga who advised;  If covid symptoms are improved since day of onset Pt can stop taking now if he's having those side effects.    Pt is notified of above  Recommendation, verbalized understanding and agrees with this plan.    Zandra Doll RN, Nurse Advisor 10:43 AM 11/5/2023        Reason for Disposition   [1] Caller has URGENT medicine question about med that PCP or specialist prescribed AND [2] triager unable to answer question    Additional Information   Negative: [1] Intentional drug overdose AND [2] suicidal thoughts or ideas   Negative: Drug overdose and triager unable to answer question   Negative: Caller requesting a renewal or refill of a medicine patient is currently taking   Negative: Caller requesting information unrelated to medicine   Negative: Caller requesting information about COVID-19 Vaccine   Negative: Caller requesting information about Emergency Contraception   Negative: Caller requesting information about Combined Birth Control Pills   Negative: Caller requesting information about Progestin Birth Control Pills   Negative: Caller requesting information about Post-Op pain or medicines   Negative: Caller requesting a prescription antibiotic (such as Penicillin) for Strep throat and has a positive culture result   Negative: Caller requesting a prescription anti-viral med (such as Tamiflu) and has influenza (flu) symptoms    Negative: Immunization reaction suspected   Negative: Rash while taking a medicine or within 3 days of stopping it   Negative: [1] Asthma and [2] having symptoms of asthma (cough, wheezing, etc.)   Negative: [1] Symptom of illness (e.g., headache, abdominal pain, earache, vomiting) AND [2] more than mild   Negative: Breastfeeding questions about mother's medicines and diet   Negative: MORE THAN A DOUBLE DOSE of a prescription or over-the-counter (OTC) drug   Negative: [1] DOUBLE DOSE (an extra dose or lesser amount) of prescription drug AND [2] any symptoms (e.g., dizziness, nausea, pain, sleepiness)   Negative: [1] DOUBLE DOSE (an extra dose or lesser amount) of over-the-counter (OTC) drug AND [2] any symptoms (e.g., dizziness, nausea, pain, sleepiness)   Negative: Took another person's prescription drug   Negative: [1] DOUBLE DOSE (an extra dose or lesser amount) of prescription drug AND [2] NO symptoms  (Exception: A double dose of antibiotics.)   Negative: Diabetes drug error or overdose (e.g., took wrong type of insulin or took extra dose)   Negative: [1] Prescription not at pharmacy AND [2] was prescribed by PCP recently (Exception: Triager has access to EMR and prescription is recorded there. Go to Home Care and confirm for pharmacy.)   Negative: [1] Pharmacy calling with prescription question AND [2] triager unable to answer question    Protocols used: Medication Question Call-A-

## 2023-12-05 ENCOUNTER — PATIENT OUTREACH (OUTPATIENT)
Dept: GASTROENTEROLOGY | Facility: CLINIC | Age: 68
End: 2023-12-05
Payer: COMMERCIAL

## 2023-12-09 ENCOUNTER — HOSPITAL ENCOUNTER (INPATIENT)
Facility: HOSPITAL | Age: 68
LOS: 3 days | Discharge: HOME | DRG: 062 | End: 2023-12-12
Attending: EMERGENCY MEDICINE | Admitting: HOSPITALIST
Payer: COMMERCIAL

## 2023-12-09 ENCOUNTER — APPOINTMENT (OUTPATIENT)
Dept: CT IMAGING | Facility: HOSPITAL | Age: 68
DRG: 062 | End: 2023-12-09
Attending: EMERGENCY MEDICINE
Payer: COMMERCIAL

## 2023-12-09 DIAGNOSIS — G47.33 OSA (OBSTRUCTIVE SLEEP APNEA): ICD-10-CM

## 2023-12-09 DIAGNOSIS — R29.90 STROKE-LIKE SYMPTOMS: ICD-10-CM

## 2023-12-09 DIAGNOSIS — I10 ESSENTIAL HYPERTENSION, BENIGN: ICD-10-CM

## 2023-12-09 DIAGNOSIS — Z13.6 CARDIOVASCULAR SCREENING; LDL GOAL LESS THAN 130: ICD-10-CM

## 2023-12-09 DIAGNOSIS — S46.219A BICEPS TENDON TEAR: ICD-10-CM

## 2023-12-09 DIAGNOSIS — I10 BENIGN ESSENTIAL HYPERTENSION: ICD-10-CM

## 2023-12-09 DIAGNOSIS — I63.511 ACUTE RIGHT ARTERIAL ISCHEMIC STROKE, MCA (MIDDLE CEREBRAL ARTERY) (H): Primary | ICD-10-CM

## 2023-12-09 PROBLEM — F10.129 ALCOHOL ABUSE WITH INTOXICATION (H): Status: ACTIVE | Noted: 2023-12-09

## 2023-12-09 PROBLEM — G89.29 CHRONIC BACK PAIN GREATER THAN 3 MONTHS DURATION: Status: ACTIVE | Noted: 2023-12-09

## 2023-12-09 PROBLEM — M54.9 CHRONIC BACK PAIN GREATER THAN 3 MONTHS DURATION: Status: ACTIVE | Noted: 2023-12-09

## 2023-12-09 PROBLEM — N17.9 ACUTE RENAL FAILURE SUPERIMPOSED ON STAGE 2 CHRONIC KIDNEY DISEASE (H): Status: ACTIVE | Noted: 2023-12-09

## 2023-12-09 PROBLEM — N18.2 ACUTE RENAL FAILURE SUPERIMPOSED ON STAGE 2 CHRONIC KIDNEY DISEASE (H): Status: ACTIVE | Noted: 2023-12-09

## 2023-12-09 PROBLEM — F33.0 MDD (MAJOR DEPRESSIVE DISORDER), RECURRENT EPISODE, MILD (H): Status: ACTIVE | Noted: 2023-12-09

## 2023-12-09 PROBLEM — D64.9 ANEMIA DUE TO UNKNOWN MECHANISM: Status: ACTIVE | Noted: 2023-10-04

## 2023-12-09 LAB
ALBUMIN SERPL BCG-MCNC: 3.7 G/DL (ref 3.5–5.2)
ALP SERPL-CCNC: 87 U/L (ref 40–150)
ALT SERPL W P-5'-P-CCNC: 23 U/L (ref 0–70)
ANION GAP SERPL CALCULATED.3IONS-SCNC: 14 MMOL/L (ref 7–15)
APTT PPP: 26 SECONDS (ref 22–38)
AST SERPL W P-5'-P-CCNC: 33 U/L (ref 0–45)
BASOPHILS # BLD AUTO: 0.1 10E3/UL (ref 0–0.2)
BASOPHILS NFR BLD AUTO: 0 %
BILIRUB DIRECT SERPL-MCNC: <0.2 MG/DL (ref 0–0.3)
BILIRUB SERPL-MCNC: <0.2 MG/DL
BUN SERPL-MCNC: 22.5 MG/DL (ref 8–23)
CALCIUM SERPL-MCNC: 8.2 MG/DL (ref 8.8–10.2)
CHLORIDE SERPL-SCNC: 105 MMOL/L (ref 98–107)
CREAT SERPL-MCNC: 1.69 MG/DL (ref 0.67–1.17)
DEPRECATED HCO3 PLAS-SCNC: 18 MMOL/L (ref 22–29)
EGFRCR SERPLBLD CKD-EPI 2021: 44 ML/MIN/1.73M2
EOSINOPHIL # BLD AUTO: 0 10E3/UL (ref 0–0.7)
EOSINOPHIL NFR BLD AUTO: 0 %
ERYTHROCYTE [DISTWIDTH] IN BLOOD BY AUTOMATED COUNT: 13.9 % (ref 10–15)
ETHANOL SERPL-MCNC: 0.15 G/DL
GLUCOSE BLDC GLUCOMTR-MCNC: 115 MG/DL (ref 70–99)
GLUCOSE SERPL-MCNC: 96 MG/DL (ref 70–99)
HBA1C MFR BLD: 5.1 %
HCT VFR BLD AUTO: 29.3 % (ref 40–53)
HGB BLD-MCNC: 9.6 G/DL (ref 13.3–17.7)
HOLD SPECIMEN: NORMAL
IMM GRANULOCYTES # BLD: 0 10E3/UL
IMM GRANULOCYTES NFR BLD: 0 %
INR PPP: 1.17 (ref 0.85–1.15)
IRON BINDING CAPACITY (ROCHE): 264 UG/DL (ref 240–430)
IRON SATN MFR SERPL: 23 % (ref 15–46)
IRON SERPL-MCNC: 61 UG/DL (ref 61–157)
LYMPHOCYTES # BLD AUTO: 2.1 10E3/UL (ref 0.8–5.3)
LYMPHOCYTES NFR BLD AUTO: 18 %
MAGNESIUM SERPL-MCNC: 1.7 MG/DL (ref 1.7–2.3)
MCH RBC QN AUTO: 31.9 PG (ref 26.5–33)
MCHC RBC AUTO-ENTMCNC: 32.8 G/DL (ref 31.5–36.5)
MCV RBC AUTO: 97 FL (ref 78–100)
MONOCYTES # BLD AUTO: 0.8 10E3/UL (ref 0–1.3)
MONOCYTES NFR BLD AUTO: 7 %
NEUTROPHILS # BLD AUTO: 8.4 10E3/UL (ref 1.6–8.3)
NEUTROPHILS NFR BLD AUTO: 75 %
NRBC # BLD AUTO: 0 10E3/UL
NRBC BLD AUTO-RTO: 0 /100
PHOSPHATE SERPL-MCNC: 2.7 MG/DL (ref 2.5–4.5)
PLATELET # BLD AUTO: 372 10E3/UL (ref 150–450)
POTASSIUM SERPL-SCNC: 4.4 MMOL/L (ref 3.4–5.3)
PROT SERPL-MCNC: 6.3 G/DL (ref 6.4–8.3)
RBC # BLD AUTO: 3.01 10E6/UL (ref 4.4–5.9)
SODIUM SERPL-SCNC: 137 MMOL/L (ref 135–145)
TROPONIN T SERPL HS-MCNC: 37 NG/L
WBC # BLD AUTO: 11.4 10E3/UL (ref 4–11)

## 2023-12-09 PROCEDURE — 3E05317 INTRODUCTION OF OTHER THROMBOLYTIC INTO PERIPHERAL ARTERY, PERCUTANEOUS APPROACH: ICD-10-PCS | Performed by: PSYCHIATRY & NEUROLOGY

## 2023-12-09 PROCEDURE — 258N000003 HC RX IP 258 OP 636: Performed by: HOSPITALIST

## 2023-12-09 PROCEDURE — 250N000011 HC RX IP 250 OP 636: Mod: JZ | Performed by: EMERGENCY MEDICINE

## 2023-12-09 PROCEDURE — HZ2ZZZZ DETOXIFICATION SERVICES FOR SUBSTANCE ABUSE TREATMENT: ICD-10-PCS | Performed by: HOSPITALIST

## 2023-12-09 PROCEDURE — 82607 VITAMIN B-12: CPT | Performed by: HOSPITALIST

## 2023-12-09 PROCEDURE — 85025 COMPLETE CBC W/AUTO DIFF WBC: CPT | Performed by: EMERGENCY MEDICINE

## 2023-12-09 PROCEDURE — 250N000011 HC RX IP 250 OP 636: Performed by: STUDENT IN AN ORGANIZED HEALTH CARE EDUCATION/TRAINING PROGRAM

## 2023-12-09 PROCEDURE — 82728 ASSAY OF FERRITIN: CPT | Performed by: HOSPITALIST

## 2023-12-09 PROCEDURE — 83735 ASSAY OF MAGNESIUM: CPT | Performed by: HOSPITALIST

## 2023-12-09 PROCEDURE — 84100 ASSAY OF PHOSPHORUS: CPT | Performed by: HOSPITALIST

## 2023-12-09 PROCEDURE — 83036 HEMOGLOBIN GLYCOSYLATED A1C: CPT | Performed by: HOSPITALIST

## 2023-12-09 PROCEDURE — 72125 CT NECK SPINE W/O DYE: CPT

## 2023-12-09 PROCEDURE — 82248 BILIRUBIN DIRECT: CPT | Performed by: EMERGENCY MEDICINE

## 2023-12-09 PROCEDURE — 99291 CRITICAL CARE FIRST HOUR: CPT | Mod: G0 | Performed by: PSYCHIATRY & NEUROLOGY

## 2023-12-09 PROCEDURE — 83550 IRON BINDING TEST: CPT | Performed by: HOSPITALIST

## 2023-12-09 PROCEDURE — 36415 COLL VENOUS BLD VENIPUNCTURE: CPT | Performed by: EMERGENCY MEDICINE

## 2023-12-09 PROCEDURE — 82077 ASSAY SPEC XCP UR&BREATH IA: CPT | Performed by: EMERGENCY MEDICINE

## 2023-12-09 PROCEDURE — 85610 PROTHROMBIN TIME: CPT | Performed by: EMERGENCY MEDICINE

## 2023-12-09 PROCEDURE — 82310 ASSAY OF CALCIUM: CPT | Performed by: EMERGENCY MEDICINE

## 2023-12-09 PROCEDURE — 70498 CT ANGIOGRAPHY NECK: CPT

## 2023-12-09 PROCEDURE — 85730 THROMBOPLASTIN TIME PARTIAL: CPT | Performed by: EMERGENCY MEDICINE

## 2023-12-09 PROCEDURE — 87637 SARSCOV2&INF A&B&RSV AMP PRB: CPT | Performed by: HOSPITALIST

## 2023-12-09 PROCEDURE — 84484 ASSAY OF TROPONIN QUANT: CPT | Performed by: EMERGENCY MEDICINE

## 2023-12-09 PROCEDURE — 70496 CT ANGIOGRAPHY HEAD: CPT

## 2023-12-09 PROCEDURE — 200N000001 HC R&B ICU

## 2023-12-09 PROCEDURE — 250N000011 HC RX IP 250 OP 636: Performed by: HOSPITALIST

## 2023-12-09 PROCEDURE — 93005 ELECTROCARDIOGRAM TRACING: CPT | Performed by: EMERGENCY MEDICINE

## 2023-12-09 PROCEDURE — 82962 GLUCOSE BLOOD TEST: CPT

## 2023-12-09 PROCEDURE — 99223 1ST HOSP IP/OBS HIGH 75: CPT | Performed by: HOSPITALIST

## 2023-12-09 PROCEDURE — 250N000009 HC RX 250: Performed by: HOSPITALIST

## 2023-12-09 RX ORDER — IOPAMIDOL 755 MG/ML
75 INJECTION, SOLUTION INTRAVASCULAR ONCE
Status: COMPLETED | OUTPATIENT
Start: 2023-12-09 | End: 2023-12-09

## 2023-12-09 RX ORDER — ONDANSETRON 2 MG/ML
4 INJECTION INTRAMUSCULAR; INTRAVENOUS EVERY 6 HOURS PRN
Status: DISCONTINUED | OUTPATIENT
Start: 2023-12-09 | End: 2023-12-12 | Stop reason: HOSPADM

## 2023-12-09 RX ORDER — HYDRALAZINE HYDROCHLORIDE 20 MG/ML
10 INJECTION INTRAMUSCULAR; INTRAVENOUS EVERY 10 MIN PRN
Status: DISCONTINUED | OUTPATIENT
Start: 2023-12-09 | End: 2023-12-12 | Stop reason: HOSPADM

## 2023-12-09 RX ORDER — LORAZEPAM 2 MG/ML
1-2 INJECTION INTRAMUSCULAR EVERY 30 MIN PRN
Status: DISCONTINUED | OUTPATIENT
Start: 2023-12-09 | End: 2023-12-12 | Stop reason: HOSPADM

## 2023-12-09 RX ORDER — HALOPERIDOL 5 MG/ML
2.5-5 INJECTION INTRAMUSCULAR EVERY 6 HOURS PRN
Status: DISCONTINUED | OUTPATIENT
Start: 2023-12-09 | End: 2023-12-12 | Stop reason: HOSPADM

## 2023-12-09 RX ORDER — FOLIC ACID 1 MG/1
1 TABLET ORAL DAILY
Status: DISCONTINUED | OUTPATIENT
Start: 2023-12-10 | End: 2023-12-09

## 2023-12-09 RX ORDER — FLUMAZENIL 0.1 MG/ML
0.2 INJECTION, SOLUTION INTRAVENOUS
Status: DISCONTINUED | OUTPATIENT
Start: 2023-12-09 | End: 2023-12-12 | Stop reason: HOSPADM

## 2023-12-09 RX ORDER — LIDOCAINE 4 G/G
1 PATCH TOPICAL
Status: DISCONTINUED | OUTPATIENT
Start: 2023-12-09 | End: 2023-12-12 | Stop reason: HOSPADM

## 2023-12-09 RX ORDER — OLANZAPINE 5 MG/1
5-10 TABLET, ORALLY DISINTEGRATING ORAL EVERY 6 HOURS PRN
Status: DISCONTINUED | OUTPATIENT
Start: 2023-12-09 | End: 2023-12-12 | Stop reason: HOSPADM

## 2023-12-09 RX ORDER — MULTIPLE VITAMINS W/ MINERALS TAB 9MG-400MCG
1 TAB ORAL DAILY
Status: DISCONTINUED | OUTPATIENT
Start: 2023-12-10 | End: 2023-12-09

## 2023-12-09 RX ORDER — PROCHLORPERAZINE 25 MG
12.5 SUPPOSITORY, RECTAL RECTAL EVERY 12 HOURS PRN
Status: DISCONTINUED | OUTPATIENT
Start: 2023-12-09 | End: 2023-12-12 | Stop reason: HOSPADM

## 2023-12-09 RX ORDER — ONDANSETRON 4 MG/1
4 TABLET, ORALLY DISINTEGRATING ORAL EVERY 6 HOURS PRN
Status: DISCONTINUED | OUTPATIENT
Start: 2023-12-09 | End: 2023-12-12 | Stop reason: HOSPADM

## 2023-12-09 RX ORDER — MULTIPLE VITAMINS W/ MINERALS TAB 9MG-400MCG
1 TAB ORAL DAILY
Status: DISCONTINUED | OUTPATIENT
Start: 2023-12-10 | End: 2023-12-12 | Stop reason: HOSPADM

## 2023-12-09 RX ORDER — FOLIC ACID 1 MG/1
1 TABLET ORAL DAILY
Status: DISCONTINUED | OUTPATIENT
Start: 2023-12-10 | End: 2023-12-12 | Stop reason: HOSPADM

## 2023-12-09 RX ORDER — PROCHLORPERAZINE MALEATE 5 MG
5 TABLET ORAL EVERY 6 HOURS PRN
Status: DISCONTINUED | OUTPATIENT
Start: 2023-12-09 | End: 2023-12-12 | Stop reason: HOSPADM

## 2023-12-09 RX ORDER — ACETAMINOPHEN 325 MG/1
650 TABLET ORAL EVERY 4 HOURS PRN
Status: DISCONTINUED | OUTPATIENT
Start: 2023-12-09 | End: 2023-12-12 | Stop reason: HOSPADM

## 2023-12-09 RX ORDER — ACETAMINOPHEN 325 MG/10.15ML
650 LIQUID ORAL EVERY 4 HOURS PRN
Status: DISCONTINUED | OUTPATIENT
Start: 2023-12-09 | End: 2023-12-12 | Stop reason: HOSPADM

## 2023-12-09 RX ORDER — SODIUM CHLORIDE 9 MG/ML
INJECTION, SOLUTION INTRAVENOUS CONTINUOUS PRN
Status: DISCONTINUED | OUTPATIENT
Start: 2023-12-09 | End: 2023-12-12 | Stop reason: HOSPADM

## 2023-12-09 RX ORDER — LABETALOL HYDROCHLORIDE 5 MG/ML
10 INJECTION, SOLUTION INTRAVENOUS EVERY 10 MIN PRN
Status: DISCONTINUED | OUTPATIENT
Start: 2023-12-09 | End: 2023-12-12 | Stop reason: HOSPADM

## 2023-12-09 RX ORDER — SODIUM CHLORIDE 9 MG/ML
INJECTION, SOLUTION INTRAVENOUS CONTINUOUS
Status: ACTIVE | OUTPATIENT
Start: 2023-12-09 | End: 2023-12-10

## 2023-12-09 RX ORDER — LORAZEPAM 0.5 MG/1
1-2 TABLET ORAL EVERY 30 MIN PRN
Status: DISCONTINUED | OUTPATIENT
Start: 2023-12-09 | End: 2023-12-12 | Stop reason: HOSPADM

## 2023-12-09 RX ORDER — LIDOCAINE 40 MG/G
CREAM TOPICAL
Status: DISCONTINUED | OUTPATIENT
Start: 2023-12-09 | End: 2023-12-12 | Stop reason: HOSPADM

## 2023-12-09 RX ADMIN — FOLIC ACID: 5 INJECTION, SOLUTION INTRAMUSCULAR; INTRAVENOUS; SUBCUTANEOUS at 23:29

## 2023-12-09 RX ADMIN — IOPAMIDOL 75 ML: 755 INJECTION, SOLUTION INTRAVENOUS at 20:44

## 2023-12-09 RX ADMIN — Medication 19 MG: at 21:32

## 2023-12-09 RX ADMIN — FAMOTIDINE 20 MG: 10 INJECTION, SOLUTION INTRAVENOUS at 23:38

## 2023-12-09 RX ADMIN — LORAZEPAM 1 MG: 2 INJECTION INTRAMUSCULAR; INTRAVENOUS at 23:29

## 2023-12-09 ASSESSMENT — LIFESTYLE VARIABLES
TOTAL SCORE: 10
TOTAL SCORE: 3
ORIENTATION AND CLOUDING OF SENSORIUM: ORIENTED AND CAN DO SERIAL ADDITIONS
VISUAL DISTURBANCES: NOT PRESENT
TREMOR: NO TREMOR
ANXIETY: NO ANXIETY, AT EASE
HEADACHE, FULLNESS IN HEAD: NOT PRESENT
AGITATION: NORMAL ACTIVITY
NAUSEA AND VOMITING: NO NAUSEA AND NO VOMITING
AGITATION: NORMAL ACTIVITY
NAUSEA AND VOMITING: NO NAUSEA AND NO VOMITING
TREMOR: MODERATE, WITH PATIENT'S ARMS EXTENDED
HEADACHE, FULLNESS IN HEAD: NOT PRESENT
PAROXYSMAL SWEATS: 2
AUDITORY DISTURBANCES: NOT PRESENT
AUDITORY DISTURBANCES: NOT PRESENT
ANXIETY: MODERATELY ANXIOUS, OR GUARDED, SO ANXIETY IS INFERRED
VISUAL DISTURBANCES: NOT PRESENT
ORIENTATION AND CLOUDING OF SENSORIUM: CANNOT DO SERIAL ADDITIONS OR IS UNCERTAIN ABOUT DATE
PAROXYSMAL SWEATS: 2

## 2023-12-09 ASSESSMENT — ACTIVITIES OF DAILY LIVING (ADL)
ADLS_ACUITY_SCORE: 35
ADLS_ACUITY_SCORE: 35

## 2023-12-10 ENCOUNTER — APPOINTMENT (OUTPATIENT)
Dept: PHYSICAL THERAPY | Facility: HOSPITAL | Age: 68
DRG: 062 | End: 2023-12-10
Attending: HOSPITALIST
Payer: COMMERCIAL

## 2023-12-10 ENCOUNTER — APPOINTMENT (OUTPATIENT)
Dept: MRI IMAGING | Facility: HOSPITAL | Age: 68
DRG: 062 | End: 2023-12-10
Attending: HOSPITALIST
Payer: COMMERCIAL

## 2023-12-10 ENCOUNTER — APPOINTMENT (OUTPATIENT)
Dept: OCCUPATIONAL THERAPY | Facility: HOSPITAL | Age: 68
DRG: 062 | End: 2023-12-10
Attending: HOSPITALIST
Payer: COMMERCIAL

## 2023-12-10 ENCOUNTER — APPOINTMENT (OUTPATIENT)
Dept: SPEECH THERAPY | Facility: HOSPITAL | Age: 68
DRG: 062 | End: 2023-12-10
Attending: HOSPITALIST
Payer: COMMERCIAL

## 2023-12-10 ENCOUNTER — APPOINTMENT (OUTPATIENT)
Dept: CARDIOLOGY | Facility: HOSPITAL | Age: 68
DRG: 062 | End: 2023-12-10
Attending: HOSPITALIST
Payer: COMMERCIAL

## 2023-12-10 LAB
ANION GAP SERPL CALCULATED.3IONS-SCNC: 9 MMOL/L (ref 7–15)
APTT PPP: 25 SECONDS (ref 22–38)
ATRIAL RATE - MUSE: 90 BPM
BUN SERPL-MCNC: 24.3 MG/DL (ref 8–23)
CALCIUM SERPL-MCNC: 8 MG/DL (ref 8.8–10.2)
CHLORIDE SERPL-SCNC: 109 MMOL/L (ref 98–107)
CHOLEST SERPL-MCNC: 133 MG/DL
CREAT SERPL-MCNC: 1.37 MG/DL (ref 0.67–1.17)
DEPRECATED HCO3 PLAS-SCNC: 19 MMOL/L (ref 22–29)
DIASTOLIC BLOOD PRESSURE - MUSE: NORMAL MMHG
EGFRCR SERPLBLD CKD-EPI 2021: 57 ML/MIN/1.73M2
ERYTHROCYTE [DISTWIDTH] IN BLOOD BY AUTOMATED COUNT: 14 % (ref 10–15)
FERRITIN SERPL-MCNC: 684 NG/ML (ref 31–409)
FLUAV RNA SPEC QL NAA+PROBE: NEGATIVE
FLUBV RNA RESP QL NAA+PROBE: NEGATIVE
GLUCOSE BLDC GLUCOMTR-MCNC: 90 MG/DL (ref 70–99)
GLUCOSE BLDC GLUCOMTR-MCNC: 93 MG/DL (ref 70–99)
GLUCOSE BLDC GLUCOMTR-MCNC: 97 MG/DL (ref 70–99)
GLUCOSE SERPL-MCNC: 98 MG/DL (ref 70–99)
HCT VFR BLD AUTO: 27.2 % (ref 40–53)
HDLC SERPL-MCNC: 42 MG/DL
HGB BLD-MCNC: 8.9 G/DL (ref 13.3–17.7)
INR PPP: 1.18 (ref 0.85–1.15)
INTERPRETATION ECG - MUSE: NORMAL
LDLC SERPL CALC-MCNC: 66 MG/DL
MCH RBC QN AUTO: 31.9 PG (ref 26.5–33)
MCHC RBC AUTO-ENTMCNC: 32.7 G/DL (ref 31.5–36.5)
MCV RBC AUTO: 98 FL (ref 78–100)
NONHDLC SERPL-MCNC: 91 MG/DL
P AXIS - MUSE: 67 DEGREES
PLATELET # BLD AUTO: 340 10E3/UL (ref 150–450)
POTASSIUM SERPL-SCNC: 4.5 MMOL/L (ref 3.4–5.3)
PR INTERVAL - MUSE: 160 MS
QRS DURATION - MUSE: 80 MS
QT - MUSE: 392 MS
QTC - MUSE: 479 MS
R AXIS - MUSE: 39 DEGREES
RBC # BLD AUTO: 2.79 10E6/UL (ref 4.4–5.9)
RSV RNA SPEC NAA+PROBE: NEGATIVE
SARS-COV-2 RNA RESP QL NAA+PROBE: NEGATIVE
SODIUM SERPL-SCNC: 137 MMOL/L (ref 135–145)
SYSTOLIC BLOOD PRESSURE - MUSE: NORMAL MMHG
T AXIS - MUSE: 23 DEGREES
TRIGL SERPL-MCNC: 127 MG/DL
VENTRICULAR RATE- MUSE: 90 BPM
VIT B12 SERPL-MCNC: 292 PG/ML (ref 232–1245)
WBC # BLD AUTO: 13.6 10E3/UL (ref 4–11)

## 2023-12-10 PROCEDURE — 80048 BASIC METABOLIC PNL TOTAL CA: CPT | Performed by: HOSPITALIST

## 2023-12-10 PROCEDURE — 97166 OT EVAL MOD COMPLEX 45 MIN: CPT | Mod: GO

## 2023-12-10 PROCEDURE — 36415 COLL VENOUS BLD VENIPUNCTURE: CPT | Performed by: HOSPITALIST

## 2023-12-10 PROCEDURE — 99232 SBSQ HOSP IP/OBS MODERATE 35: CPT | Performed by: INTERNAL MEDICINE

## 2023-12-10 PROCEDURE — 200N000001 HC R&B ICU

## 2023-12-10 PROCEDURE — 97161 PT EVAL LOW COMPLEX 20 MIN: CPT | Mod: GP

## 2023-12-10 PROCEDURE — 99255 IP/OBS CONSLTJ NEW/EST HI 80: CPT | Performed by: PSYCHIATRY & NEUROLOGY

## 2023-12-10 PROCEDURE — 85730 THROMBOPLASTIN TIME PARTIAL: CPT | Performed by: HOSPITALIST

## 2023-12-10 PROCEDURE — 92523 SPEECH SOUND LANG COMPREHEN: CPT | Mod: GN | Performed by: SPEECH-LANGUAGE PATHOLOGIST

## 2023-12-10 PROCEDURE — 85027 COMPLETE CBC AUTOMATED: CPT | Performed by: HOSPITALIST

## 2023-12-10 PROCEDURE — 70551 MRI BRAIN STEM W/O DYE: CPT

## 2023-12-10 PROCEDURE — 92610 EVALUATE SWALLOWING FUNCTION: CPT | Mod: GN | Performed by: SPEECH-LANGUAGE PATHOLOGIST

## 2023-12-10 PROCEDURE — 70544 MR ANGIOGRAPHY HEAD W/O DYE: CPT

## 2023-12-10 PROCEDURE — 999N000208 ECHOCARDIOGRAM COMPLETE

## 2023-12-10 PROCEDURE — 250N000011 HC RX IP 250 OP 636: Mod: JZ | Performed by: HOSPITALIST

## 2023-12-10 PROCEDURE — 97112 NEUROMUSCULAR REEDUCATION: CPT | Mod: GO

## 2023-12-10 PROCEDURE — 255N000002 HC RX 255 OP 636: Performed by: INTERNAL MEDICINE

## 2023-12-10 PROCEDURE — 97530 THERAPEUTIC ACTIVITIES: CPT | Mod: GP

## 2023-12-10 PROCEDURE — 258N000003 HC RX IP 258 OP 636: Performed by: INTERNAL MEDICINE

## 2023-12-10 PROCEDURE — 80061 LIPID PANEL: CPT | Performed by: HOSPITALIST

## 2023-12-10 PROCEDURE — 85610 PROTHROMBIN TIME: CPT | Performed by: HOSPITALIST

## 2023-12-10 PROCEDURE — 97110 THERAPEUTIC EXERCISES: CPT | Mod: GP

## 2023-12-10 PROCEDURE — 93306 TTE W/DOPPLER COMPLETE: CPT | Mod: 26 | Performed by: INTERNAL MEDICINE

## 2023-12-10 PROCEDURE — 258N000003 HC RX IP 258 OP 636: Performed by: HOSPITALIST

## 2023-12-10 PROCEDURE — 250N000013 HC RX MED GY IP 250 OP 250 PS 637: Performed by: HOSPITALIST

## 2023-12-10 RX ORDER — SODIUM CHLORIDE 9 MG/ML
INJECTION, SOLUTION INTRAVENOUS CONTINUOUS
Status: ACTIVE | OUTPATIENT
Start: 2023-12-10 | End: 2023-12-11

## 2023-12-10 RX ADMIN — PERFLUTREN 2 ML: 6.52 INJECTION, SUSPENSION INTRAVENOUS at 08:30

## 2023-12-10 RX ADMIN — LIDOCAINE 1 PATCH: 4 PATCH TOPICAL at 21:02

## 2023-12-10 RX ADMIN — Medication 1 TABLET: at 13:41

## 2023-12-10 RX ADMIN — LORAZEPAM 1 MG: 2 INJECTION INTRAMUSCULAR; INTRAVENOUS at 05:00

## 2023-12-10 RX ADMIN — SODIUM CHLORIDE: 9 INJECTION, SOLUTION INTRAVENOUS at 21:12

## 2023-12-10 RX ADMIN — LORAZEPAM 1 MG: 2 INJECTION INTRAMUSCULAR; INTRAVENOUS at 15:26

## 2023-12-10 RX ADMIN — FOLIC ACID 1 MG: 1 TABLET ORAL at 13:41

## 2023-12-10 RX ADMIN — SODIUM CHLORIDE: 9 INJECTION, SOLUTION INTRAVENOUS at 09:50

## 2023-12-10 RX ADMIN — LORAZEPAM 1 MG: 0.5 TABLET ORAL at 21:10

## 2023-12-10 RX ADMIN — THIAMINE HCL TAB 100 MG 100 MG: 100 TAB at 13:41

## 2023-12-10 RX ADMIN — FAMOTIDINE 20 MG: 10 INJECTION, SOLUTION INTRAVENOUS at 21:02

## 2023-12-10 ASSESSMENT — ACTIVITIES OF DAILY LIVING (ADL)
ADLS_ACUITY_SCORE: 26
ADLS_ACUITY_SCORE: 25
ADLS_ACUITY_SCORE: 27
ADLS_ACUITY_SCORE: 25
ADLS_ACUITY_SCORE: 27
ADLS_ACUITY_SCORE: 27
ADLS_ACUITY_SCORE: 25
DEPENDENT_IADLS:: INDEPENDENT
ADLS_ACUITY_SCORE: 26
ADLS_ACUITY_SCORE: 26
ADLS_ACUITY_SCORE: 25

## 2023-12-10 NOTE — PROGRESS NOTES
"Speech-Language Pathology: Clinical Bedside Swallow Evaluation and Speech/Language Evaluation       12/10/23 1300   Appointment Info   Signing Clinician's Name / Credentials (SLP) Bianca Macdonald M.S. CCC-SLP   General Information   Onset of Illness/Injury or Date of Surgery 11/09/23   Referring Physician Bijal Stinson   Patient/Family Therapy Goal Statement (SLP) To return to baseline function   Pertinent History of Current Problem Per EMR, patient is a \"68 year old male admitted on 12/9/2023. He was brought to the ED by ambulance for evaluation of left-sided weakness, slurred speech\" noted to have acute CVA, s/p TNK at 9:32.  Patient and x2 daughters in room endorse slurred speech and facial droop- improving from initial onset.   General Observations Patient is soft spoken- this is baseline per family in room.   Type of Evaluation   Type of Evaluation Swallow Evaluation;Speech, Language, Cognition   Oral Motor   Oral Musculature anomalies present   Structural Abnormalities present   Dentition (Oral Motor)   Dentition (Oral Motor) adequate dentition   Facial Symmetry (Oral Motor)   Facial Symmetry (Oral Motor) left side impairment   Left Side Facial Asymmetry minimal impairment   Lip Function (Oral Motor)   Lip Range of Motion (Oral Motor)   (Mildly decreased lip seal)   Tongue Function (Oral Motor)   Tongue ROM (Oral Motor) protrusion is impaired;lateralization is impaired  (mildly left deviation at protrusion, effortful L lateralization)   Jaw Function (Oral Motor)   Jaw Function (Oral Motor) WNL   Facial Sensation   Facial Sensation other (see comments)  (decreased- noted to have unrecognized anterior loss of bolus x3 on left side)   Vocal Quality/Secretion Management (Oral Motor)   Vocal Quality (Oral Motor) WFL  (quiet but baseline per family in room)   Secretion Management (Oral Motor) WNL   General Swallowing Observations   Past History of Dysphagia Denied by patient   Current Diet/Method of Nutritional Intake " (General Swallowing Observations, NIS) NPO   Swallowing Evaluation Clinical swallow evaluation   Clinical Swallow Evaluation   Feeding Assistance minimal assistance required   Clinical Swallow Evaluation Textures Trialed thin liquids;pureed;solid foods   Clinical Swallow Eval: Thin Liquid Texture Trial   Mode of Presentation, Thin Liquids straw   Volume of Liquid or Food Presented 8oz   Oral Phase of Swallow WFL   Pharyngeal Phase of Swallow intact   Diagnostic Statement No overt s/s aspiration with small single and large consecutive drinks by straw.   Clinical Swallow Evaluation: Puree Solid Texture Trial   Mode of Presentation, Puree self-fed;fed by clinician;spoon   Volume of Puree Presented 2oz   Oral Phase, Puree WFL   Pharyngeal Phase, Puree intact   Diagnostic Statement No overt s/s aspiration.  Timely oral phase and adequate oral clearance.  One instance of bolus on left anterior lip/chin d/t reduced fine motor skills- patient did not recognize RADHA.   Clinical Swallow Evaluation: Solid Food Texture Trial   Mode of Presentation self-fed   Volume Presented Grahan cracker   Oral Phase   (slow but functional mastication)   Pharyngeal Phase intact   Diagnostic Statement No overt s/s aspiration.  Slow but functional mastication and adequate oral clearance.  x2 anterior loss of bolus with bite of cracker- unrecognized until cued despite large size.   Esophageal Phase of Swallow   Patient reports or presents with symptoms of esophageal dysphagia No   Swallowing Recommendations   Diet Consistency Recommendations thin liquids (level 0);regular diet   Supervision Level for Intake close supervision needed   Mode of Delivery Recommendations bolus size, small;slow rate of intake   Swallowing Maneuver Recommendations alternate food and liquid intake   Monitoring/Assistance Required (Eating/Swallowing) check mouth frequently for oral residue/pocketing;cue for finger/lingual sweep if oral pocketing present;monitor for cough  or change in vocal quality with intake   Recommended Feeding/Eating Techniques (Swallow Eval) maintain upright sitting position for eating;minimize distractions during oral intake   Medication Administration Recommendations, Swallowing (SLP) Per patient preference   Instrumental Assessment Recommendations instrumental evaluation not recommended at this time   Motor Speech   Vocal Loudness (Motor Speech) reduced loudness  (baseline per family)   Speech Intelligibility (Motor Speech) conversational level   Comment, Motor Speech Assessment Patient presents with ~90% intelligible speech- notable for reduced articulatory precision, especially for longer words/utterances.   Articulation (Motor Speech) errors with increasing word length;imprecise articulation   Dysarthria differential diagnosis Flaccid/LMN   Conversational Level, Speech Intelligibility (Motor Speech) minimal impairment   Auditory Comprehension   Follows Commands (Auditory Comprehension) 2-step commands;1-step command;WNL   Comment, Assessment (Auditory Comprehension) Patient able to answer questions and follow directions appropriately during evaluation.   Yes/No Questions (Auditory Comprehension) complex questions   1 Step, Follows Commands (Auditory Comprehension) over 90% accuracy   2 Step, Follows Commands (Auditory Comprehension) over 90% accuracy   Complex Questions (Auditory Comprehension) over 90% accuracy   Verbal Expression   Comment, Assessment (Verbal Expression) Patient able to explain/describe without overt word finding difficulties   Confrontational Naming (Verbal Expression) objects   Conversational Speech (Verbal Expression) WNL;connected speech   Word Finding Skills (Verbal Expression) generative naming   Objects, Confrontational Naming (Verbal Expression) intact   Generative Naming, Word Finding (Verbal Expression) intact   Connected Speech, Conversational (Verbal Expression) intact   Cognition   Cognitive Status Exam Comments Patient  able to recall 3/3 words immediately, 1/3 following delay with distraction- increased to 3/3 given semantic cue.  Patient denied cognitive concerns; however, further assessment may be warranted.   General Therapy Interventions   Planned Therapy Interventions Dysphagia Treatment;Communication   Dysphagia treatment Instruction of safe swallow strategies;Compensatory strategies for swallowing   Communication Improve speech intelligibility   Clinical Impression   Criteria for Skilled Therapeutic Interventions Met (SLP Eval) Yes, treatment indicated   Risks & Benefits of therapy have been explained evaluation/treatment results reviewed;care plan/treatment goals reviewed;risks/benefits reviewed;current/potential barriers reviewed;participants voiced agreement with care plan;participants included;patient;daughter   Clinical Impression Comments Patient seen for clinical bedside swallow evaluation and speech/language evaluation. Patient presents with mild-moderate oral dysphagia and no suspected pharyngeal dysphagia.  Patient appears appropriate for a regular diet with thin liquids- with strategy use including alternating liquids/solids, checking for pocketing, small sips/bites, slow rate, and minimize distractions with intake.  Patient presents with mild dysarthria (improving per patient/family), improved with strategy use.   SLP Total Evaluation Time   Eval: oral/pharyngeal swallow function, clinical swallow Minutes (19779) 10   Eval: Sound production with lang comprehension and expression Minutes (64882) 15   SLP Goals   Therapy Frequency (SLP Eval) 5 times/week   SLP Predicted Duration/Target Date for Goal Attainment 12/10/23   SLP Goals Swallow;SLP Goal 1   SLP: Safely tolerate diet without signs/symptoms of aspiration Regular diet;Thin liquids;Independently;With use of compensatory swallow strategies   SLP: Goal 1 Patient will learn and demonstrate use of speech intelligibility strategies (i.e., slower, exaggerated,  louder) to be >95% intelligible during 5 minutes of structured conversation.   SLP Discharge Planning   SLP Plan 5x/wk: f/u diet tolerance and strategy use, speech intelligibility strategies, consider if further cog ax warranted   SLP Discharge Recommendation home with home health;home with outpatient therapy services;Transitional Care Facility   SLP Rationale for DC Rec Below baseline for communication   SLP Brief overview of current status  Regular solids/thin liquids with strategy use, ST to follow for dysarthria   Total Session Time   Total Session Time (sum of timed and untimed services) 25
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
## 2023-12-10 NOTE — PROGRESS NOTES
12/10/23 1430   Appointment Info   Signing Clinician's Name / Credentials (OT) Esther Graf MOT OTR/L CLT   Living Environment   People in Home spouse   Current Living Arrangements house   Home Accessibility stairs to enter home   Number of Stairs, Main Entrance 6   Living Environment Comments I at baseline, tub/shower with BC, std toilet with sink next to toilet   Self-Care   Activity/Exercise/Self-Care Comment I at baseline, works full time.   General Information   Onset of Illness/Injury or Date of Surgery 12/09/23   Referring Physician    Additional Occupational Profile Info/Pertinent History of Current Problem Donnie Ernandez is a 68 year old male with h/o  left-sided weakness, slurred speech     #Acute CVA   Existing Precautions/Restrictions no known precautions/restrictions   Cognitive Status Examination   Affect/Mental Status (Cognitive) WNL   Follows Commands WNL   Visual Perception   Impact of Vision Impairment on Function (Vision) states no vision impairment,better with his glasses on. Did not notice in obvious neglect- will continue to monitor   Range of Motion Comprehensive   General Range of Motion no range of motion deficits identified   Strength Comprehensive (MMT)   General Manual Muscle Testing (MMT) Assessment hand strength deficits identified   Comment, General Manual Muscle Testing (MMT) Assessment FM coord and grasp impairedon  L, sh/elbow on L WNL   Coordination   Gross Motor Coordination   (L impaired)   Fine Motor Coordination   (L impaired)   Transfers   Transfers sit-stand transfer   Sit-Stand Transfer   Sit-Stand [place] (Transfers) supervision;verbal cues   Sit/Stand Transfer Comments ambulation a few steps with fww and SBA   Activities of Daily Living   BADL Assessment/Intervention lower body dressing   Lower Body Dressing Assessment/Training   [place] Level (Lower Body Dressing) socks;supervision;verbal cues   Clinical Impression   Criteria for Skilled  Therapeutic Interventions Met (OT) Yes, treatment indicated   OT Diagnosis decreased strength/coord for ADLs   OT Problem List-Impairments impacting ADL problems related to;strength;coordination   Assessment of Occupational Performance 1-3 Performance Deficits   Identified Performance Deficits drsg, eating,   Planned Therapy Interventions (OT) ADL retraining;neuromuscular re-education;transfer training;strengthening   Clinical Decision Making Complexity (OT) detailed assessment/moderate complexity   Risk & Benefits of therapy have been explained care plan/treatment goals reviewed   OT Total Evaluation Time   OT Eval, Moderate Complexity Minutes (68857) 8   OT Goals   Therapy Frequency (OT) Daily   OT Predicted Duration/Target Date for Goal Attainment 12/17/23   OT Goals Hygiene/Grooming;Lower Body Dressing;Transfers   OT: Hygiene/Grooming supervision/stand-by assist   OT: Lower Body Dressing Supervision/stand-by assist   OT: Transfer Supervision/stand-by assist   Interventions   Interventions Quick Adds Neuromuscular Re-education   Neuromuscular Re-Education   Neuromuscular Re-Education Minutes (07234) 10   Treatment Detail/Skilled Intervention education on using left hand asmuch as possible. noted increased difficulty with lunch and uses fork. Issued foam handle to A with grasp. LB drsg task- cues for 1 handed technique- increased difficulty with grasp sock on left- encouraged opening hand out first and then try to grab the sock. Ambulated in room with fww and SBA- cues for hand placement on left. Rtn to chair with SBA- cues for safe decent.   OT Discharge Planning   OT Plan higher level clinic trsfs, L hand neuro- handwriting, theraputty   OT Discharge Recommendation (DC Rec) home with outpatient occupational therapy   OT Brief overview of current status SBA   Total Session Time   Timed Code Treatment Minutes 10   Total Session Time (sum of timed and untimed services) 18

## 2023-12-10 NOTE — ED NOTES
Bed: JNED-03  Expected date: 12/9/23  Expected time: 8:02 PM  Means of arrival: Ambulance  Comments:  [facility] - Stoke code
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
## 2023-12-10 NOTE — PROGRESS NOTES
"   12/10/23 1100   Appointment Info   Signing Clinician's Name / Credentials (PT) Yue Albarado, PT, DPT   Living Environment   People in Home spouse  (Ex-wife)   Current Living Arrangements house   Home Accessibility stairs to enter home   Number of Stairs, Main Entrance 6   Stair Railings, Main Entrance railing on left side (ascending)   Transportation Anticipated car, drives self;family or friend will provide   Living Environment Comments All needs met on main level. Laundry in basement.   Self-Care   Usual Activity Tolerance good   Current Activity Tolerance moderate   Equipment Currently Used at Home none   Fall history within last six months yes   Number of times patient has fallen within last six months 1   Activity/Exercise/Self-Care Comment Patient is independent at baseline and works full-time.   General Information   Onset of Illness/Injury or Date of Surgery 12/09/23   Referring Physician Александр Kathleen MD   Patient/Family Therapy Goals Statement (PT) Return to PLOF   Pertinent History of Current Problem (include personal factors and/or comorbidities that impact the POC) Per chart, \"Donnie Ernandez is a 68 year old male admitted on 12/9/2023. He was brought to the ED by ambulance for evaluation of left-sided weakness, slurred speech.\"   Existing Precautions/Restrictions fall   Range of Motion (ROM)   Range of Motion ROM is WFL   Strength (Manual Muscle Testing)   Strength (Manual Muscle Testing) strength is WFL   Strength Comments RLE MMT grossly 5/5. LLE MMT grossly 4/5.   Bed Mobility   Bed Mobility supine-sit   Supine-Sit [place] (Bed Mobility) contact guard   Impairments Contributing to Impaired Bed Mobility decreased strength   Assistive Device (Bed Mobility) bed rails   Transfers   Transfers sit-stand transfer   Impairments Contributing to Impaired Transfers impaired balance;decreased strength   Sit-Stand Transfer   Sit-Stand [place] (Transfers) contact guard   Assistive Device " (Sit-Stand Transfers) other (see comments)  (None)   Gait/Stairs (Locomotion)   [place] Level (Gait) contact guard   Assistive Device (Gait) other (see comments)  (None)   Comment, (Gait/Stairs) Stairs not tested due to lines.   Balance   Balance other (describe)   Sitting Balance: Static good balance   Sitting Balance: Dynamic good balance   Sit-to-Stand Balance good balance   Standing Balance: Static fair balance   Standing Balance: Dynamic fair balance   Balance Quick Add Sitting balance: Static;Sitting balance: dynamic;Sit to stand balance;Standing balance: static;Standing balance: dynamic   Coordination   Coordination no deficits were identified   Coordination Comments B heel-shin test WFL.   Clinical Impression   Criteria for Skilled Therapeutic Intervention Yes, treatment indicated   PT Diagnosis (PT) Impaired functional mobility   Influenced by the following impairments Impaired balance, decreased strength LLE   Functional limitations due to impairments Bed mobility, transfers, gait, stairs   Clinical Presentation (PT Evaluation Complexity) stable   Clinical Presentation Rationale Patient presents as medically diagnosed.   Clinical Decision Making (Complexity) low complexity   Planned Therapy Interventions (PT) balance training;bed mobility training;gait training;home exercise program;neuromuscular re-education;patient/family education;stair training;strengthening;transfer training;home program guidelines   Risk & Benefits of therapy have been explained evaluation/treatment results reviewed;care plan/treatment goals reviewed;patient   PT Total Evaluation Time   PT Eval, Low Complexity Minutes (51908) 20   Physical Therapy Goals   PT Frequency Daily   PT Predicted Duration/Target Date for Goal Attainment 12/17/23   PT Goals Bed Mobility;Transfers;Gait;Stairs   PT: Bed Mobility Independent;Supine to/from sit   PT: Transfers Modified independent;Sit to/from stand;Assistive device  (LRAD)   PT: Gait  Modified independent;150 feet  (LRAD)   PT: Stairs Supervision/stand-by assist;6 stairs;Rail on left   Interventions   Interventions Quick Adds Therapeutic Activity;Therapeutic Procedure   Therapeutic Procedure/Exercise   Ther. Procedure: strength, endurance, ROM, flexibillity Minutes (54757) 8   Symptoms Noted During/After Treatment none   Treatment Detail/Skilled Intervention Patient completed seated BLE exercises x 10 reps each. Supervision and verbal cues for exercise technique. Patient reported fatigue following all activities.   Therapeutic Activity   Therapeutic Activities: dynamic activities to improve functional performance Minutes (21268) 8   Symptoms Noted During/After Treatment Fatigue   Treatment Detail/Skilled Intervention Once sitting EOB, patient was able to maintain midline and sitting balance with CGA-SBA. Increased time needed for patient to don socks at EOB. No LOB with task. Additional sit<>stand from recliner with CGA to FWW. Patient ambulated forward/backward near recliner with CGA and FWW. Marching in place x 10 reps with CGA, no AD.   PT Discharge Planning   PT Plan Progress transfers and gait, stairs as able, LLE strengthening   PT Discharge Recommendation (DC Rec) home with assist;home with home care physical therapy;home with outpatient physical therapy   PT Rationale for DC Rec Patient is mobilizing with CGA. Pending progress, anticipate patient will be able to return home with assist from family and home PT vs. OP PT to improve balance and strength.   PT Brief overview of current status CGA for bed mobility, transfers, and short distance gait.   PT Equipment Needed at Discharge other (see comments)  (TBD)   Total Session Time   Timed Code Treatment Minutes 16   Total Session Time (sum of timed and untimed services) 36

## 2023-12-10 NOTE — CONSULTS
Care Management Initial Consult    General Information  Assessment completed with: Patient, Children, patient and daughter  Type of CM/SW Visit: Initial Assessment    Primary Care Provider verified and updated as needed: Yes   Readmission within the last 30 days:        Reason for Consult: discharge planning  Advance Care Planning:            Communication Assessment  Patient's communication style: spoken language (English or Bilingual)    Hearing Difficulty or Deaf: no   Wear Glasses or Blind: no    Cognitive  Cognitive/Neuro/Behavioral: .WDL except, all  Level of Consciousness: alert  Arousal Level: opens eyes spontaneously  Orientation: disoriented to, time  Mood/Behavior: flat affect, cooperative     Speech: garbled, slurred    Living Environment:   People in home: spouse  Bianka  Current living Arrangements: house      Able to return to prior arrangements: other (see comments) (TBD pending therapy eval)       Family/Social Support:  Care provided by: self  Provides care for: no one  Marital Status: Lives with Significant Other (lives with ex-wife, they are back together per patient)  Significant Other, Children       Bianka  Description of Support System: Supportive, Involved         Current Resources:   Patient receiving home care services: No     Community Resources: None  Equipment currently used at home:    Supplies currently used at home: None    Employment/Financial:  Employment Status: employed full-time        Financial Concerns:             Does the patient's insurance plan have a 3 day qualifying hospital stay waiver?  Yes     Which insurance plan 3 day waiver is available? Alternative insurance waiver    Will the waiver be used for post-acute placement? Undetermined at this time    Lifestyle & Psychosocial Needs:  Social Determinants of Health     Food Insecurity: Low Risk  (11/1/2023)    Food Insecurity     Within the past 12 months, did you worry that your food would run out before you got money to  buy more?: No     Within the past 12 months, did the food you bought just not last and you didn t have money to get more?: No   Depression: Not at risk (3/16/2023)    PHQ-2     PHQ-2 Score: 0   Housing Stability: Low Risk  (11/1/2023)    Housing Stability     Do you have housing? : Yes     Are you worried about losing your housing?: No   Tobacco Use: Low Risk  (11/1/2023)    Patient History     Smoking Tobacco Use: Never     Smokeless Tobacco Use: Never     Passive Exposure: Not on file   Financial Resource Strain: Low Risk  (11/1/2023)    Financial Resource Strain     Within the past 12 months, have you or your family members you live with been unable to get utilities (heat, electricity) when it was really needed?: No   Alcohol Use: Not on file   Transportation Needs: Low Risk  (11/1/2023)    Transportation Needs     Within the past 12 months, has lack of transportation kept you from medical appointments, getting your medicines, non-medical meetings or appointments, work, or from getting things that you need?: No   Physical Activity: Not on file   Interpersonal Safety: Not on file   Stress: Not on file   Social Connections: Not on file       Functional Status:  Prior to admission patient needed assistance:   Dependent ADLs:: Independent  Dependent IADLs:: Independent       Mental Health Status:          Chemical Dependency Status:                Values/Beliefs:  Spiritual, Cultural Beliefs, Druze Practices, Values that affect care:                 Additional Information:  Met with patient and daughter Stephanie. Patient says he lives in a house with his ex wife, but they are back together per patient. Says he is completely independent at baseline. Works full time as a  . Drives. No services or equipment. Therapy eval pending. CM will follow     Mariah Garcia RN

## 2023-12-10 NOTE — PLAN OF CARE
Problem: Stroke, Ischemic (Includes Transient Ischemic Attack)  Goal: Improved Communication Skills  Outcome: Progressing     Problem: Stroke, Ischemic (Includes Transient Ischemic Attack)  Goal: Optimal Cognitive Function  Outcome: Progressing   Goal Outcome Evaluation:                  [facility] - ICU    RN Progress Note:            Pertinent Assessments:      Please refer to flowsheet rows for full assessment     NIHSS improved from 11 to 5/6. Patient slept between assessments.           Key Events - This Shift:       None.                    Barriers to Discharge / Downgrade:     MRI, ECHO pending. Also will have PT/OT/SLP evals today.         Point of Contact Update Y  Daughters saw patient after he was settled in room. Answered questions. Verified contact info.
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
## 2023-12-10 NOTE — MEDICATION SCRIBE - ADMISSION MEDICATION HISTORY
Medication Scribe Admission Medication History    Admission medication history is complete. The information provided in this note is only as accurate as the sources available at the time of the update.    Information Source(s): Patient, Family member, and CareEverywhere/SureScripts via in-person    Pertinent Information:     Changes made to PTA medication list:  Added: None  Deleted: None  Changed: None    Medication Affordability:  Not including over the counter (OTC) medications, was there a time in the past 3 months when you did not take your medications as prescribed because of cost?: No    Allergies reviewed with patient and updates made in EHR: yes    Medication History Completed By: RICH MORSE 12/9/2023 9:55 PM    PTA Med List   Medication Sig Last Dose    Acetaminophen (TYLENOL EXTRA STRENGTH PO) Take 1,000 mg by mouth daily as needed  12/9/2023 at am    Ascorbic Acid (VITAMIN C PO)  12/9/2023 at am    benzonatate (TESSALON) 100 MG capsule Take 1 capsule (100 mg) by mouth 3 times daily as needed for cough Unknown at prn    fluticasone (FLONASE) 50 MCG/ACT nasal spray SPRAY 1 SPRAY IN EACH NOSTRIL TWO TIMES A DAY PRN Unknown at prn    losartan (COZAAR) 100 MG tablet Take 1 tablet (100 mg) by mouth daily 12/8/2023 at pm    metoprolol succinate ER (TOPROL XL) 100 MG 24 hr tablet Take 1 tablet (100 mg) by mouth daily 12/8/2023 at pm    VITAMIN D PO  12/9/2023 at am    zolpidem ER (AMBIEN CR) 12.5 MG CR tablet Take 1 tablet (12.5 mg) by mouth nightly as needed for sleep 12/8/2023 at pm
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
## 2023-12-10 NOTE — PROGRESS NOTES
[facility]    PROGRESS NOTE - Hospitalist Service    Assessment and Plan    Principal Problem:    Stroke-like symptoms  Active Problems:    Essential hypertension, benign    NILDA (obstructive sleep apnea)    Anemia due to unknown mechanism    Acute renal failure superimposed on stage 2 chronic kidney disease     Alcohol abuse with intoxication (H24)    Donnie Ernandez is a 68 year old male with h/o  left-sided weakness, slurred speech     #Acute CVA  -Left-sided weakness, slurred speech, right gaze preference, left-sided neglect  -CT head showed no acute territorial infarction; CTA showed no large vessel occlusion, however findings which may reflect occlusion of the distal M2/M3 branch, reduced enhancement in the inferior aspect of the superior sagittal sinus; CTA neck without significant stenosis or dissection  -Stroke neurologist input appreciated: TNK given at 9:32 PM  -ICU monitoring:  neurochecks, telemetry to rule out arrhythmias for 24 hrs post TNK  - off bedrest   -Echocardiogram Normal LV funx, NWMA, no shunting   -No antiplatelets or anticoagulation at least for 24 hours post TNK  -MRI/MRV ordered 24 hrs post TNK (per neuro note CT of head 24hrs post TNK but appear MRI ordered instead)   -Check lipid panel LDL <70 statin wasnot started awaiting neuro to see today  hemoglobin A1c normal range  -PT/OT/SLP evaluation and treatment  -Formal neurology consult placed still havent seen today      #Alcohol abuse and intoxication  -BAL 0.15 g/dL  -IV multivitamins, thiamine, folic acid  -Monitor with CIWA     #Acute kidney injury on chronic kidney disease stage II  #None anion gap metabolic acidosis  - continue IVF for now   -Avoid nephrotoxins  - recheck labs in am      #Abnormal high-sensitivity troponin T  -Denies angina symptoms  - agree  related to acute CVA and demand ischemia  - Echo NWMA     #Anemia  -History of normocytic anemia  -No signs of acute blood loss, monitor, may also be  "form ETOH use  -Hemoglobin 9.6, down from 11.4 on 12/13/2022  -Check ferritin, iron panel, vitamin B12     #Leukocytosis  - stress related and probable acute demargination     #NILDA  -Oxygen via nasal cannula/CPAP as needed for sleep     #Chronic low back pain  -No acute exacerbation after found on the floor  -Supportive management  -Lidocaine patch     #Coagulation defect  -INR 1.17  -LFTs within normal range  -Monitor for bleeding     #Essential hypertension  -Permissive hypertension  -IV labetalol/hydralazine as needed: Goal SBP < 180, DBP < 105 per neuro       Clinically Significant Risk Factors      # Overweight: Estimated body mass index is 28.31 kg/m  as calculated from the following:  Height as of this encounter: 1.626 m (5' 4\").  Weight as of this encounter: 74.8 kg (164 lb 14.4 oz)., PRESENT ON ADMISSION    COVID-19 PCR Results          4/26/2023    10:04 12/9/2023    23:15   COVID-19 PCR Results   SARS CoV2 PCR Negative  Negative      COVID-19 Antibody Results, Testing for Immunity           No data to display               Code Status: Full Code  VTE prophylaxis:  Pneumatic Compression Devices  DIET: Orders Placed This Encounter      Regular Diet Adult Thin Liquids (level 0)    Drains/Lines: none  Weight bearing status: WBAT     Expected Discharge Date: 12/11/2023              Subjective:  Speech is slowly improving and still left arm weakness    PHYSICAL EXAM  Temp:  [97.6  F (36.4  C)-98.4  F (36.9  C)] 98.3  F (36.8  C)  Pulse:  [79-96] 92  Resp:  [12-30] 17  BP: ()/() 145/69  SpO2:  [93 %-100 %] 97 %  Wt Readings from Last 1 Encounters:   12/10/23 71.1 kg (156 lb 12 oz)       Intake/Output Summary (Last 24 hours) at 12/10/2023 0835  Last data filed at 12/10/2023 0500  Gross per 24 hour   Intake 426 ml   Output 500 ml   Net -74 ml      Body mass index is 25.3 kg/m .    Physical Exam  Constitutional:       Appearance: Normal appearance.   HENT:      Head: Normocephalic and atraumatic. "   Cardiovascular:      Rate and Rhythm: Normal rate and regular rhythm.      Pulses: Normal pulses.      Heart sounds: Normal heart sounds.   Pulmonary:      Effort: Pulmonary effort is normal.      Breath sounds: Normal breath sounds.   Abdominal:      General: Bowel sounds are normal. There is no distension.      Palpations: Abdomen is soft.      Tenderness: There is no abdominal tenderness. There is no guarding.   Musculoskeletal:         General: Normal range of motion.      Cervical back: Normal range of motion and neck supple.      Right lower leg: No edema.      Left lower leg: No edema.   Skin:     General: Skin is warm and dry.      Capillary Refill: Capillary refill takes less than 2 seconds.   Neurological:      Mental Status: He is alert and oriented to person, place, and time.      Cranial Nerves: Dysarthria present.      Sensory: Sensation is intact.      Coordination: Coordination abnormal. Finger-Nose-Finger Test abnormal and Heel to Newell Test abnormal.      Deep Tendon Reflexes:      Reflex Scores:       Tricep reflexes are 2+ on the right side and 3+ on the left side.       Bicep reflexes are 2+ on the right side and 3+ on the left side.       Brachioradialis reflexes are 2+ on the right side and 3+ on the left side.       Patellar reflexes are 3+ on the right side and 3+ on the left side.       Achilles reflexes are 3+ on the right side and 3+ on the left side.     Comments: Left facial droop, slurred speech  Left arm weakness   Positive arm drift on left   Proximal motor weaknesses on LLE, distal motor 5/5        PERTINENT LABS/IMAGING:  Results for orders placed or performed during the hospital encounter of 12/09/23   CTA Head Neck with Contrast    Impression    IMPRESSION:   HEAD CT:  1.  No acute territorial infarction.    HEAD CTA:   1.  No large vessel occlusion is identified. However, there is somewhat decreased vessel arborization in the region of the right frontal operculum, which may  reflect occlusion of a distal M2/M3 branch. MRI is recommended for further evaluation.  2.  Enhancement is present throughout the dural venous sinuses. However, there is somewhat reduced enhancement in the inferior aspect of the superior sagittal sinus compared to adjacent sinuses. This is favored to be technique related as opposed to   reflecting a nonocclusive thrombus. CTV or MRV can be performed for further evaluation if clinically warranted.    NECK CTA:  1.  No hemodynamically significant stenosis in the neck vessels.   2.  No evidence for dissection.    Findings were discussed with Dr. De La Fuente via telephone at 8:58 PM on 12/09/2023.   Cervical spine CT w/o contrast    Impression    IMPRESSION:  1.  No fracture or posttraumatic subluxation.  2.  Degenerative changes, as above.       Imaging results reviewed over the past 24 hrs:   Recent Results (from the past 24 hour(s))   CTA Head Neck with Contrast    Narrative    EXAM: CTA HEAD NECK W CONTRAST  LOCATION: [facility]  DATE: 12/9/2023    INDICATION: Code Stroke to evaluate for potential thrombolysis and thrombectomy. Left-sided weakness, left facial droop.  COMPARISON: None.  CONTRAST: Isovue 370 75ml  TECHNIQUE: Head and neck CT angiogram with IV contrast. Noncontrast head CT followed by axial helical CT images of the head and neck vessels obtained during the arterial phase of intravenous contrast administration. Axial 2D reconstructed images and   multiplanar 3D MIP reconstructed images of the head and neck vessels were performed by the technologist. Dose reduction techniques were used. All stenosis measurements made according to NASCET criteria unless otherwise specified.    FINDINGS:   NONCONTRAST HEAD CT:   INTRACRANIAL CONTENTS: No intracranial hemorrhage, extraaxial collection, or mass effect.  No CT evidence of acute territorial infarct. Mild presumed chronic small vessel ischemic changes. Mild generalized volume loss. No  hydrocephalus.     Bilateral carotid siphon and vertebral artery V4 segment atherosclerotic calcifications.    VISUALIZED ORBITS/SINUSES/MASTOIDS: No intraorbital abnormality. Scattered paranasal sinus mucosal thickening, greatest in the right maxillary sinus. No middle ear or mastoid effusion.    BONES/SOFT TISSUES: No acute abnormality.    HEAD CTA:  ANTERIOR CIRCULATION: No definite stenosis/occlusion. However, there is somewhat decreased vessel arborization in the region of the right frontal operculum. No aneurysm or high flow vascular malformation. Developmentally hypoplastic left A1 anterior   cerebral artery segment. The left A2 segment receives flow from the right-sided circulation via the anterior communicating artery.    POSTERIOR CIRCULATION: No stenosis/occlusion, aneurysm, or high flow vascular malformation. Balanced vertebral arteries supply a normal basilar artery.     DURAL VENOUS SINUSES: Enhancement is present throughout the major dural venous sinuses. However, degree of enhancement in the inferior aspect of the superior sagittal sinus is somewhat reduced compared to enhancement in the superior aspect of the sinus   and the straight sinus.    NECK CTA:  RIGHT CAROTID: Atherosclerotic plaque results in less than 50% stenosis in the right ICA. No dissection. Retropharyngeal course of the right ICA.    LEFT CAROTID: Atherosclerotic plaque results in less than 50% stenosis in the left ICA. No dissection.    VERTEBRAL ARTERIES: No focal stenosis or dissection. Balanced vertebral arteries.    AORTIC ARCH: Classic aortic arch anatomy with no significant stenosis at the origin of the great vessels.    NONVASCULAR STRUCTURES: Multilevel cervical spine degenerative changes.      Impression    IMPRESSION:   HEAD CT:  1.  No acute territorial infarction.    HEAD CTA:   1.  No large vessel occlusion is identified. However, there is somewhat decreased vessel arborization in the region of the right frontal  operculum, which may reflect occlusion of a distal M2/M3 branch. MRI is recommended for further evaluation.  2.  Enhancement is present throughout the dural venous sinuses. However, there is somewhat reduced enhancement in the inferior aspect of the superior sagittal sinus compared to adjacent sinuses. This is favored to be technique related as opposed to   reflecting a nonocclusive thrombus. CTV or MRV can be performed for further evaluation if clinically warranted.    NECK CTA:  1.  No hemodynamically significant stenosis in the neck vessels.   2.  No evidence for dissection.    Findings were discussed with Dr. De La Fuente via telephone at 8:58 PM on 12/09/2023.   Cervical spine CT w/o contrast    Narrative    EXAM: CT CERVICAL SPINE W/O CONTRAST  LOCATION: [facility]  DATE: 12/9/2023    INDICATION: Fall. Traumatic injury.  COMPARISON: None.  TECHNIQUE: Routine CT Cervical Spine without IV contrast. Multiplanar reformats. Dose reduction techniques were used.    FINDINGS:  VERTEBRA: Normal vertebral body heights. Straightening of the normal cervical lordosis. No fracture or posttraumatic subluxation.     CANAL/FORAMINA: Multilevel degenerative changes. Disc osteophyte complexes/bulges are present at the levels of C4-C5, C5-C6 and C6-C7 where there is up to mild canal stenosis. At C2-C3 there is moderate right neural foraminal status. At C4-C5 there is   moderate to severe bilateral neural foraminal stenosis. At C5-C6 there is mild right and severe left neural foraminal stenosis. At C6-C7 there is moderate left neural foraminal stenosis.    PARASPINAL: No extraspinal abnormality.      Impression    IMPRESSION:  1.  No fracture or posttraumatic subluxation.  2.  Degenerative changes, as above.   Echocardiogram Complete - For age > 60 yrs    Narrative    [id number]  [id number]  [id number]  [id number]^[address]^MOE^[place] [address]     Name: ULISES MOSQUERA  R  MRN: [id number]  : 1955  Study Date: 12/10/2023 07:57 AM  Age: 68 yrs  Gender: Male  Patient Location: [facility]  Reason For Study: Cerebrovascular Incident  Ordering Physician: MOE MANCUSO  Performed By: TETO     BSA: 1.8 m2  Height: 66 in  Weight: 156 lb  HR: 91  BP: 136/64 mmHg  ______________________________________________________________________________  Procedure  Complete Echo Adult. Definity ([facility]C #55348-652) given intravenously.  ______________________________________________________________________________  Interpretation Summary     1. The left ventricle is normal in size. Left ventricular systolic performance  is hyperdynamic with a visually estimated ejection fraction of 75%.  2. No significant valvular heart disease is identified on this study.  3. Normal right ventricular size and systolic performance.  4. There is mild left atrial enlargement.  5. Echo contrast examination was performed using agitated NS as contrast  agent. The right heart opacity was good and the left heart was well  visualized. There was no evidence of right to left shunting during spontaneous  respiration or following release of Valsalva.  ______________________________________________________________________________  Left ventricle:  The left ventricle is normal in size. Left ventricular systolic performance is  hyperdynamic with a visually estimated ejection fraction of 75%. There is  normal regional wall motion. Left ventricular wall thickness is normal.     Assessment of LV Diastolic Function: The cumulative findings suggest normal  diastolic filling [The septal e' velocity is > 7 cm/s & lateral e' velocity  is  > 10 cm/s. The average E/e' is < 14. TR velocity is < 2.8 m/s. Left atrial  volume index is greater than 34 mL/mÂ ].     Right ventricle:  Normal right ventricular size and systolic performance.     Left atrium:  There is mild left atrial enlargement.     Right atrium:  The right atrium is of normal  size.     IVC:  The IVC is of normal caliber.     Aortic valve:  The aortic valve is comprised of three cusps. No significant aortic stenosis  or aortic insufficiency is detected on this study (there is mild increased  velocities through the aortic valve though undoubtedly related to the  hyperdynamic state of the ventricle).     Mitral valve:  The mitral valve appears morphologically normal. There is trace mitral  insufficiency. Parenthetically, the mean gradient across the mitral valve is  mildly increased though undoubtedly is a reflection of the hyperdynamic state  of the left ventricle. There appears to be normal mitral valve leaflet  excursion on two-dimensional imaging.     Tricuspid valve:  The tricuspid valve is grossly morphologically normal. There is trace  tricuspid insufficiency.     Pulmonic valve:  The pulmonic valve is grossly morphologically normal.     Thoracic aorta:  The aortic root and proximal ascending aorta are of normal dimension.     Pericardium:  There is no significant pericardial effusion.  ______________________________________________________________________________  ______________________________________________________________________________  MMode/2D Measurements & Calculations  IVSd: 1.3 cm  LVIDd: 4.0 cm  LVIDs: 2.7 cm  LVPWd: 1.3 cm  FS: 31.5 %  LV mass(C)d: 189.8 grams  LV mass(C)dI: 105.5 grams/m2  Ao root diam: 3.8 cm  asc Aorta Diam: 3.5 cm  LVOT diam: 2.2 cm  LVOT area: 3.8 cm2  Ao root diam index Ht(cm/m): 2.3  Ao root diam index BSA (cm/m2): 2.1  Asc Ao diam index BSA (cm/m2): 1.9  Asc Ao diam index Ht(cm/m): 2.1     LA Volume Indexed (AL/bp): 42.4 ml/m2  RWT: 0.66  TAPSE: 2.1 cm     Time Measurements  MM HR: 92.0 BPM     Doppler Measurements & Calculations  MV E max milla: 152.0 cm/sec  MV A max milla: 156.0 cm/sec  MV E/A: 0.97  MV max P.3 mmHg  MV mean P.0 mmHg  MV V2 VTI: 39.0 cm  MVA(VTI): 3.7 cm2  Ao V2 max: 234.0 cm/sec  Ao max P.0 mmHg  Ao V2 mean: 161.0  cm/sec  Ao mean P.0 mmHg  Ao V2 VTI: 53.7 cm  MIRTHA(I,D): 2.7 cm2  MIRTHA(V,D): 2.6 cm2  LV V1 max PG: 10.6 mmHg  LV V1 max: 163.0 cm/sec  LV V1 VTI: 38.1 cm  SV(LVOT): 144.8 ml  SI(LVOT): 80.5 ml/m2     PA V2 max: 148.5 cm/sec  PA max P.8 mmHg  PA acc time: 0.11 sec  TR max mars: 264.0 cm/sec  TR max P.9 mmHg  AV Mars Ratio (DI): 0.70  MIRTHA Index (cm2/m2): 1.5  E/E': 19.7  E/E' avg: 15.6  Lateral E/e': 11.6  Medial E/e': 19.7  Peak E' Mars: 7.7 cm/sec  RV S Mars: 18.8 cm/sec     ______________________________________________________________________________  Report approved by: Tita Trujillo 12/10/2023 11:41 AM           Recent Labs   Lab 12/10/23  1147 12/10/23  0742 12/10/23  0428 23  2314 231   WBC  --   --  13.6*  --  11.4*   HGB  --   --  8.9*  --  9.6*   MCV  --   --  98  --  97   PLT  --   --  340  --  372   INR  --   --  1.18*  --  1.17*   NA  --   --  137  --  137   POTASSIUM  --   --  4.5  --  4.4   CHLORIDE  --   --  109*  --  105   CO2  --   --  19*  --  18*   BUN  --   --  24.3*  --  22.5   CR  --   --  1.37*  --  1.69*   ANIONGAP  --   --  9  --  14   KAMI  --   --  8.0*  --  8.2*   GLC 97 90 98   < > 96   ALBUMIN  --   --   --   --  3.7   PROTTOTAL  --   --   --   --  6.3*   BILITOTAL  --   --   --   --  <0.2   ALKPHOS  --   --   --   --  87   ALT  --   --   --   --  23   AST  --   --   --   --  33    < > = values in this interval not displayed.     25 MINUTES SPENT BY ME on the date of service doing chart review, history, exam, documentation, discussion with nursing staff and specialist, & further activities per the note.  Bijal Stinson MD  [facility] Medicine Service  [phone]

## 2023-12-11 ENCOUNTER — APPOINTMENT (OUTPATIENT)
Dept: PHYSICAL THERAPY | Facility: HOSPITAL | Age: 68
DRG: 062 | End: 2023-12-11
Attending: INTERNAL MEDICINE
Payer: COMMERCIAL

## 2023-12-11 ENCOUNTER — APPOINTMENT (OUTPATIENT)
Dept: SPEECH THERAPY | Facility: HOSPITAL | Age: 68
DRG: 062 | End: 2023-12-11
Attending: INTERNAL MEDICINE
Payer: COMMERCIAL

## 2023-12-11 PROBLEM — I63.511 ACUTE RIGHT ARTERIAL ISCHEMIC STROKE, MCA (MIDDLE CEREBRAL ARTERY) (H): Status: ACTIVE | Noted: 2023-12-09

## 2023-12-11 LAB
ANION GAP SERPL CALCULATED.3IONS-SCNC: 10 MMOL/L (ref 7–15)
BUN SERPL-MCNC: 11.4 MG/DL (ref 8–23)
CALCIUM SERPL-MCNC: 8.6 MG/DL (ref 8.8–10.2)
CHLORIDE SERPL-SCNC: 112 MMOL/L (ref 98–107)
CREAT SERPL-MCNC: 1 MG/DL (ref 0.67–1.17)
DEPRECATED HCO3 PLAS-SCNC: 19 MMOL/L (ref 22–29)
EGFRCR SERPLBLD CKD-EPI 2021: 82 ML/MIN/1.73M2
GLUCOSE BLDC GLUCOMTR-MCNC: 101 MG/DL (ref 70–99)
GLUCOSE BLDC GLUCOMTR-MCNC: 101 MG/DL (ref 70–99)
GLUCOSE BLDC GLUCOMTR-MCNC: 109 MG/DL (ref 70–99)
GLUCOSE BLDC GLUCOMTR-MCNC: 99 MG/DL (ref 70–99)
GLUCOSE SERPL-MCNC: 122 MG/DL (ref 70–99)
POTASSIUM SERPL-SCNC: 4.1 MMOL/L (ref 3.4–5.3)
SODIUM SERPL-SCNC: 141 MMOL/L (ref 135–145)

## 2023-12-11 PROCEDURE — 250N000013 HC RX MED GY IP 250 OP 250 PS 637: Performed by: PSYCHIATRY & NEUROLOGY

## 2023-12-11 PROCEDURE — 250N000011 HC RX IP 250 OP 636: Mod: JZ | Performed by: HOSPITALIST

## 2023-12-11 PROCEDURE — 97112 NEUROMUSCULAR REEDUCATION: CPT | Mod: GP

## 2023-12-11 PROCEDURE — 120N000004 HC R&B MS OVERFLOW

## 2023-12-11 PROCEDURE — 97116 GAIT TRAINING THERAPY: CPT | Mod: GP

## 2023-12-11 PROCEDURE — 36415 COLL VENOUS BLD VENIPUNCTURE: CPT | Performed by: INTERNAL MEDICINE

## 2023-12-11 PROCEDURE — 92526 ORAL FUNCTION THERAPY: CPT | Mod: GN

## 2023-12-11 PROCEDURE — 99233 SBSQ HOSP IP/OBS HIGH 50: CPT | Performed by: PSYCHIATRY & NEUROLOGY

## 2023-12-11 PROCEDURE — 250N000013 HC RX MED GY IP 250 OP 250 PS 637: Performed by: HOSPITALIST

## 2023-12-11 PROCEDURE — 99233 SBSQ HOSP IP/OBS HIGH 50: CPT | Performed by: INTERNAL MEDICINE

## 2023-12-11 PROCEDURE — 92507 TX SP LANG VOICE COMM INDIV: CPT | Mod: GN

## 2023-12-11 PROCEDURE — 80048 BASIC METABOLIC PNL TOTAL CA: CPT | Performed by: INTERNAL MEDICINE

## 2023-12-11 PROCEDURE — 250N000013 HC RX MED GY IP 250 OP 250 PS 637: Performed by: INTERNAL MEDICINE

## 2023-12-11 PROCEDURE — 97530 THERAPEUTIC ACTIVITIES: CPT | Mod: GP

## 2023-12-11 RX ORDER — METOPROLOL SUCCINATE 50 MG/1
50 TABLET, EXTENDED RELEASE ORAL DAILY
Status: DISCONTINUED | OUTPATIENT
Start: 2023-12-11 | End: 2023-12-12 | Stop reason: HOSPADM

## 2023-12-11 RX ORDER — ASPIRIN 81 MG/1
81 TABLET, CHEWABLE ORAL DAILY
Status: DISCONTINUED | OUTPATIENT
Start: 2023-12-11 | End: 2023-12-12 | Stop reason: HOSPADM

## 2023-12-11 RX ORDER — CLOPIDOGREL BISULFATE 75 MG/1
75 TABLET ORAL DAILY
Status: DISCONTINUED | OUTPATIENT
Start: 2023-12-11 | End: 2023-12-12 | Stop reason: HOSPADM

## 2023-12-11 RX ADMIN — CLOPIDOGREL BISULFATE 75 MG: 75 TABLET ORAL at 10:53

## 2023-12-11 RX ADMIN — FOLIC ACID 1 MG: 1 TABLET ORAL at 09:27

## 2023-12-11 RX ADMIN — FAMOTIDINE 20 MG: 10 INJECTION, SOLUTION INTRAVENOUS at 22:03

## 2023-12-11 RX ADMIN — THIAMINE HCL TAB 100 MG 100 MG: 100 TAB at 09:27

## 2023-12-11 RX ADMIN — ASPIRIN 81 MG: 81 TABLET, CHEWABLE ORAL at 10:53

## 2023-12-11 RX ADMIN — Medication 1 TABLET: at 09:26

## 2023-12-11 RX ADMIN — METOPROLOL SUCCINATE 50 MG: 50 TABLET, EXTENDED RELEASE ORAL at 16:29

## 2023-12-11 RX ADMIN — LIDOCAINE 1 PATCH: 4 PATCH TOPICAL at 19:55

## 2023-12-11 ASSESSMENT — ACTIVITIES OF DAILY LIVING (ADL)
ADLS_ACUITY_SCORE: 31
ADLS_ACUITY_SCORE: 31
ADLS_ACUITY_SCORE: 33
ADLS_ACUITY_SCORE: 31
ADLS_ACUITY_SCORE: 33
ADLS_ACUITY_SCORE: 31
ADLS_ACUITY_SCORE: 33
ADLS_ACUITY_SCORE: 33
ADLS_ACUITY_SCORE: 31
ADLS_ACUITY_SCORE: 33
ADLS_ACUITY_SCORE: 33
ADLS_ACUITY_SCORE: 31
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
## 2023-12-11 NOTE — PLAN OF CARE
Goal Outcome Evaluation:  [facility] - ICU    RN Progress Note:            Pertinent Assessments:      Please refer to flowsheet rows for full assessment     Stable VS, high bp at times. Afebrile. Alert and orient x4. Continue to have slight drift on left upper extremity with NIHSS. Slow speech but Improved.            Key Events - This Shift:       MRI done this shift.               Barriers to Discharge / Downgrade:     None

## 2023-12-11 NOTE — PROGRESS NOTES
Care Management Follow Up    Length of Stay (days): 2    Expected Discharge Date: 12/12/2023     Concerns to be Addressed:       Patient plan of care discussed at interdisciplinary rounds: Yes    Anticipated Discharge Disposition: Other (Comments) (TBD pending therapy recs)     Anticipated Discharge Services:    Anticipated Discharge DME:      Patient/family educated on Medicare website which has current facility and service quality ratings:    Education Provided on the Discharge Plan:    Patient/Family in Agreement with the Plan:      Referrals Placed by CM/SW:    Private pay costs discussed: Not applicable    Additional Information:  Therapy rec home vs outpatient therapy. Referral sent to [facility] to see if patient even has coverage for home care.     Mariah Garcia RN

## 2023-12-11 NOTE — PHARMACY-CONSULT NOTE
Pharmacy Consult to evaluate for medication related stroke core measures    Donnie Ernandez, 68 year old male admitted for MCA stroke on 12/9/2023.    Thrombolytic was given on 12/9 at 2132.    VTE Prophylaxis SCDs /PCDs placed on 12/10 @0400, as appropriate prior to end of hospital day 2.    Antithrombotic: aspirin and clopidogrel started on 12/11, as appropriate by end of hospital day 2. Continue antithrombotic therapy on discharge to meet quality measures, unless contraindicated.    Anticoagulation if history of A-fib/flutter: Patient does not have history of A-fib/flutter - anticoagulation not required for medication related stroke core measures. May need outpatient cardiac monitoring for atrial fibrillation.    LDL Cholesterol Calculated   Date Value Ref Range Status   12/10/2023 66 <=100 mg/dL Final   06/10/2020 101 (H) <100 mg/dL Final     Comment:     Above desirable:  100-129 mg/dl  Borderline High:  130-159 mg/dL  High:             160-189 mg/dL  Very high:       >189 mg/dl         Statin therapy: Patient's LDL currently at goal with no statin medication indicated. No evidence of statin benefit in patients with LDL <100 and no history of ASCVD per AHA/ASA Stroke guidelines.      Recommendations: None at this time    Thank you for the consult.    Keiry Weller [facility] 12/11/2023 9:47 AM

## 2023-12-12 ENCOUNTER — APPOINTMENT (OUTPATIENT)
Dept: PHYSICAL THERAPY | Facility: HOSPITAL | Age: 68
DRG: 062 | End: 2023-12-12
Attending: INTERNAL MEDICINE
Payer: COMMERCIAL

## 2023-12-12 ENCOUNTER — HOSPITAL ENCOUNTER (INPATIENT)
Dept: CARDIOLOGY | Facility: HOSPITAL | Age: 68
Discharge: HOME | DRG: 062 | End: 2023-12-12
Attending: INTERNAL MEDICINE
Payer: COMMERCIAL

## 2023-12-12 ENCOUNTER — TELEPHONE (OUTPATIENT)
Dept: FAMILY MEDICINE | Facility: CLINIC | Age: 68
End: 2023-12-12
Payer: COMMERCIAL

## 2023-12-12 VITALS
BODY MASS INDEX: 25.37 KG/M2 | WEIGHT: 157.85 LBS | SYSTOLIC BLOOD PRESSURE: 161 MMHG | OXYGEN SATURATION: 97 % | HEART RATE: 76 BPM | TEMPERATURE: 98.8 F | DIASTOLIC BLOOD PRESSURE: 73 MMHG | RESPIRATION RATE: 20 BRPM | HEIGHT: 66 IN

## 2023-12-12 DIAGNOSIS — I63.511 ACUTE RIGHT ARTERIAL ISCHEMIC STROKE, MCA (MIDDLE CEREBRAL ARTERY) (H): ICD-10-CM

## 2023-12-12 LAB
ANION GAP SERPL CALCULATED.3IONS-SCNC: 9 MMOL/L (ref 7–15)
BUN SERPL-MCNC: 10.7 MG/DL (ref 8–23)
CALCIUM SERPL-MCNC: 8.4 MG/DL (ref 8.8–10.2)
CHLORIDE SERPL-SCNC: 108 MMOL/L (ref 98–107)
CREAT SERPL-MCNC: 0.98 MG/DL (ref 0.67–1.17)
DEPRECATED HCO3 PLAS-SCNC: 20 MMOL/L (ref 22–29)
EGFRCR SERPLBLD CKD-EPI 2021: 85 ML/MIN/1.73M2
GLUCOSE BLDC GLUCOMTR-MCNC: 113 MG/DL (ref 70–99)
GLUCOSE SERPL-MCNC: 104 MG/DL (ref 70–99)
HOLD SPECIMEN: NORMAL
POTASSIUM SERPL-SCNC: 3.9 MMOL/L (ref 3.4–5.3)
SODIUM SERPL-SCNC: 137 MMOL/L (ref 135–145)

## 2023-12-12 PROCEDURE — 97116 GAIT TRAINING THERAPY: CPT | Mod: GP

## 2023-12-12 PROCEDURE — 36415 COLL VENOUS BLD VENIPUNCTURE: CPT | Performed by: INTERNAL MEDICINE

## 2023-12-12 PROCEDURE — 250N000013 HC RX MED GY IP 250 OP 250 PS 637: Performed by: HOSPITALIST

## 2023-12-12 PROCEDURE — 250N000013 HC RX MED GY IP 250 OP 250 PS 637: Performed by: INTERNAL MEDICINE

## 2023-12-12 PROCEDURE — 93270 REMOTE 30 DAY ECG REV/REPORT: CPT

## 2023-12-12 PROCEDURE — 250N000013 HC RX MED GY IP 250 OP 250 PS 637: Performed by: PSYCHIATRY & NEUROLOGY

## 2023-12-12 PROCEDURE — 99239 HOSP IP/OBS DSCHRG MGMT >30: CPT | Performed by: INTERNAL MEDICINE

## 2023-12-12 PROCEDURE — 82310 ASSAY OF CALCIUM: CPT | Performed by: INTERNAL MEDICINE

## 2023-12-12 PROCEDURE — 97530 THERAPEUTIC ACTIVITIES: CPT | Mod: GP

## 2023-12-12 PROCEDURE — 97112 NEUROMUSCULAR REEDUCATION: CPT | Mod: GP

## 2023-12-12 RX ORDER — ASPIRIN 81 MG/1
81 TABLET, CHEWABLE ORAL DAILY
COMMUNITY
Start: 2023-12-13

## 2023-12-12 RX ORDER — CLOPIDOGREL BISULFATE 75 MG/1
75 TABLET ORAL DAILY
Qty: 21 TABLET | Refills: 0 | Status: SHIPPED | OUTPATIENT
Start: 2023-12-13 | End: 2024-01-26

## 2023-12-12 RX ORDER — LOSARTAN POTASSIUM 100 MG/1
100 TABLET ORAL DAILY
Start: 2023-12-14 | End: 2023-12-15

## 2023-12-12 RX ADMIN — CLOPIDOGREL BISULFATE 75 MG: 75 TABLET ORAL at 08:48

## 2023-12-12 RX ADMIN — ASPIRIN 81 MG: 81 TABLET, CHEWABLE ORAL at 08:48

## 2023-12-12 RX ADMIN — Medication 1 TABLET: at 08:48

## 2023-12-12 RX ADMIN — METOPROLOL SUCCINATE 50 MG: 50 TABLET, EXTENDED RELEASE ORAL at 08:48

## 2023-12-12 RX ADMIN — FOLIC ACID 1 MG: 1 TABLET ORAL at 08:48

## 2023-12-12 RX ADMIN — THIAMINE HCL TAB 100 MG 100 MG: 100 TAB at 08:48

## 2023-12-12 ASSESSMENT — ACTIVITIES OF DAILY LIVING (ADL)
ADLS_ACUITY_SCORE: 33

## 2023-12-12 NOTE — PLAN OF CARE
Physical Therapy Discharge Summary    Reason for therapy discharge:    Discharged to home with outpatient therapy.    Progress towards therapy goal(s). See goals on Care Plan in [facility] electronic health record for goal details.  Goals partially met.  Barriers to achieving goals:   discharge from facility.    Therapy recommendation(s):    Continued therapy is recommended.  Rationale/Recommendations:  outpatient PT. Recommending 24 hour supervision/assist at this time.

## 2023-12-12 NOTE — PLAN OF CARE
Goal Outcome Evaluation:  [facility] - ICU    RN Progress Note:            Pertinent Assessments:      Please refer to flowsheet rows for full assessment     High BP this shift, Otherwise stable VS. Up to bathroom couple times with walker. Slow slurred speech, improving. Slight facial droop.            Key Events - This Shift:       Uneventful, see above               Barriers to Discharge / Downgrade:     None

## 2023-12-12 NOTE — PLAN OF CARE
"[facility] - ICU    RN Progress Note:            Pertinent Assessments:      Please refer to flowsheet rows for full assessment     Remains afebrile.  BM x2 and voiding freely.  Does report some \"prostate issues\" and sometimes doesn't empty bladder fully. Good appetite.           Key Events - This Shift:       NIHSS 3 due to some slurred speech, left sided weakness and slight drift LUE.  Walking in halls with staff and PT and doing well.  BP's started to climb this evening and restarted half dose home metoprolol-responded well.  Downgraded to neuro/tele.              Barriers to Discharge / Downgrade:   Neuro tele status.        Point of Contact Update YES-OR-NO: Yes  If No, reason:   Name:Uziel  Phone Number:see chart  Summary of Conversation: at bedside updated throughout the day.         "

## 2023-12-12 NOTE — PLAN OF CARE
Occupational Therapy Discharge Summary    Reason for therapy discharge:    Discharged to home with outpatient therapy.    Progress towards therapy goal(s). See goals on Care Plan in [facility] electronic health record for goal details.  Goals partially met.  Barriers to achieving goals:   discharge from facility.    Therapy recommendation(s):    Continued therapy is recommended.  Rationale/Recommendations:  address cognition and LUE function.

## 2023-12-12 NOTE — PROGRESS NOTES
[facility] Progress Note - Hospitalist Service    Date of Admission:  12/9/2023    Assessment & Plan       68 year old male with h/o HTN, Etoh abuse, GERD, NILDA, CKD2, and normocytic anemia who presented with left-sided weakness, slurred speech and was found to have acute R MCA ischemic stroke.  He received tenectaplase and is feeling better than on admission.     #Acute right arterial ischemic stroke, MCA (middle cerebral artery)  -Left-sided weakness, slurred speech, right gaze preference, left-sided neglect  -CT head showed no acute territorial infarction; CTA showed no large vessel occlusion, however findings which may reflect occlusion of the distal M2/M3 branch, reduced enhancement in the inferior aspect of the superior sagittal sinus; CTA neck without significant stenosis or dissection  -Stroke neurologist input appreciated: TNK given at 9:32 PM  -ICU monitoring:  neurochecks, telemetry to rule out arrhythmias for 24 hrs post TNK  - off bedrest   -Echocardiogram Normal LV funx, NWMA, no shunting   -No antiplatelets or anticoagulation at least for 24 hours post TNK  -MRI/MRV ordered 24 hrs post TNK   -Check lipid panel LDL <70 statin not felt to be indicated, hemoglobin A1c normal range  -PT/OT/SLP evaluation and treatment   -ASA and plavix per neurology for 3 weeks then ASA 81 mg indefinitely     #Alcohol abuse and intoxication  -BAL 0.15 g/dL  -multivitamins, thiamine, folic acid  -Monitor with CIWA, no signs of withdrawal yet     #Acute kidney injury on chronic kidney disease stage II  #None anion gap metabolic acidosis  - was hydrated and has improved  - Avoid nephrotoxins      #Abnormal high-sensitivity troponin T  -Denies angina symptoms  - agree  related to acute CVA and demand ischemia  - Echo NWMA     #Anemia  -History of normocytic anemia  -No signs of acute blood loss, monitor, may also be form ETOH use  -Hemoglobin 8.9, down from 11.4 on 12/13/2022 - likely  "dilutional, was dehydrated on admit  -Iron panel and vitamin B12 levels all normal     #Leukocytosis  - stress related and probable acute demargination     #NILDA  -Oxygen via nasal cannula/CPAP as needed for sleep     #Chronic low back pain  -No acute exacerbation after found on the floor  -Supportive management  -Lidocaine patch     #Coagulation defect  -INR 1.17  -LFTs within normal range  -Monitor for bleeding     #Essential hypertension  -Permissive hypertension  -IV labetalol/hydralazine as needed: Goal SBP < 180, DBP < 105 per neuro   - restart BP meds slowly - start metoprolol XL 50 mg daily for now.          Diet: Regular Diet Adult Thin Liquids (level 0)    DVT Prophylaxis: Pneumatic Compression Devices  Bradley Catheter: Not present  Lines: None     Cardiac Monitoring: ACTIVE order. Indication: ICU  Code Status: Full Code      Clinically Significant Risk Factors               # Coagulation Defect: INR = 1.18 (Ref range: 0.85 - 1.15) and/or PTT = 25 Seconds (Ref range: 22 - 38 Seconds), will monitor for bleeding    # Hypertension: Noted on problem list        # Overweight: Estimated body mass index is 25.9 kg/m  as calculated from the following:    Height as of this encounter: 1.676 m (5' 6\").    Weight as of this encounter: 72.8 kg (160 lb 7.9 oz)., PRESENT ON ADMISSION            Disposition Plan      Expected Discharge Date: 12/12/2023                    Edmar Limon MD  Hospitalist Service  [facility]  Securely message with [employer] (more info)  Text page via [employer] Paging/Directory   ______________________________________________________________________    Interval History   Weakness feels better but still unsteady a bit per therapy    Physical Exam   Vital Signs: Temp: 99.4  F (37.4  C) Temp src: Oral BP: (!) 156/73 Pulse: 88   Resp: 18 SpO2: 97 % O2 Device: None (Room air)    Weight: 160 lbs 7.92 oz    General Appearance: Older M in NAD  Respiratory:  rhonchi " bilaterally  Cardiovascular: RRR S1S2  GI: +BS, soft, NT  Skin: no rashes on exposed areas  Neuro: Alert, oriented, left facial droop with subj decreased sens on left face, Motor 5-/5 LUE and prox LLE, 5/5 on Right side, sensory subj decreased on LUE compared to RUE      Medical Decision Making       50 MINUTES SPENT BY ME on the date of service doing chart review, history, exam, documentation & further activities per the note.      Data     I have personally reviewed the following data over the past 24 hrs:    N/A  \   N/A   / N/A     141 112 (H) 11.4 /  99   4.1 19 (L) 1.00 \       Imaging results reviewed over the past 24 hrs:   Recent Results (from the past 24 hour(s))   MRV Brain wo Contrast    Narrative    EXAM: MRV BRAIN W/O CONTRAST  LOCATION: [facility]  DATE: 12/10/2023    INDICATION: eval for superior sagittal sinus thrombus  COMPARISON: CTA head neck 12/09/2023  TECHNIQUE: Phase contrast and 2-D time-of-flight head MRV without intravenous contrast.    FINDINGS:  DURAL SINUSES: No significant stenosis or occlusion. 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    INTERNAL CEREBRAL VEINS: No significant stenosis or occlusion.      MAJOR CORTICAL VENOUS BRANCHES: No significant stenosis or occlusion.      Impression    IMPRESSION:  1.  No dural venous sinus thrombosis or significant stenosis.   MR Brain w/o Contrast    Narrative    EXAM: MR BRAIN W/O CONTRAST  LOCATION: [facility]  DATE: 12/10/2023    INDICATION: Acute ischemic stroke.  COMPARISON: CTA dated 12/09/2023.  TECHNIQUE: Routine multiplanar multisequence head MRI without intravenous contrast.    FINDINGS:  INTRACRANIAL CONTENTS: There is patchy abnormal diffusion restriction identified involving the right insula. Additionally, there is linear band of abnormal diffusion restriction identified involving the right capsular region as well as along the cortices   of the right  posterior frontal lobe and right parietal lobe, in keeping with acute/subacute ischemic injury. There is associated T2/FLAIR signal hyperintensity in these regions. No hemorrhagic conversion. No mass, acute hemorrhage, or extra-axial fluid   collections. Scattered nonspecific T2/FLAIR hyperintensities within the cerebral white matter most consistent with mild chronic microvascular ischemic change. Mild generalized cerebral atrophy. No hydrocephalus. Normal position of the cerebellar tonsils.       SELLA: No abnormality accounting for technique.    OSSEOUS STRUCTURES/SOFT TISSUES: Normal marrow signal. The major intracranial vascular flow voids are maintained.     ORBITS: No abnormality accounting for technique.     SINUSES/MASTOIDS: Mild ethmoid air cell mucosal thickening. Moderate right maxillary sinus mucosal thickening. No middle ear or mastoid effusion.       Impression    IMPRESSION:  1.  Acute right MCA territory infarct identified involving the right insula, right external capsule, right frontal lobe and right parietal lobe, as above. No hemorrhagic conversion or significant mass effect.  2.  Chronic senescent and presumed microvascular ischemic changes, as above.

## 2023-12-12 NOTE — DISCHARGE SUMMARY
"[facility]  Hospitalist Discharge Summary      Date of Admission:  12/9/2023  Date of Discharge:  12/12/2023  Discharging Provider: Edmar Limon MD  Discharge Service: Hospitalist Service    Discharge Diagnoses     Principal Problem:    Acute right arterial ischemic stroke, MCA (middle cerebral artery) (H)  Active Problems:    Benign essential hypertension    GERD (gastroesophageal reflux disease)    NILDA (obstructive sleep apnea)    Anemia due to unknown mechanism    Acute kidney injury superimposed on stage 2 chronic kidney disease     Non-anion gap metabolic acidosis    Alcohol abuse with intoxication (H24)    Mild to moderate oral dysphagia (no pharyngeal dysphagia)    Clinically Significant Risk Factors     # Overweight: Estimated body mass index is 25.48 kg/m  as calculated from the following:    Height as of this encounter: 1.676 m (5' 6\").    Weight as of this encounter: 71.6 kg (157 lb 13.6 oz).       Follow-ups Needed After Discharge        Follow up with primary care provider, Mae Jones, within 7 days for hospital follow- up.  No follow up labs or test are needed.  Follow up with [facility] neurology in 2 months         Discharge Disposition   Admited to home with outpatient PT/OT and speech as needed.  Condition at discharge: Stable    Hospital Course   68 year old male with h/o HTN, Etoh abuse, GERD, NILDA, CKD2, and normocytic anemia who presented with left-sided weakness, slurred speech and was found to have acute R MCA ischemic stroke.   CT head showed no acute territorial infarction; CTA showed no large vessel occlusion, however findings which may reflect occlusion of the distal M2/M3 branch, reduced enhancement in the inferior aspect of the superior sagittal sinus; CTA neck without significant stenosis or dissection.  Stroke neurologist was consulted and TNK was given at 9:32 PM.  Patient was monitored in the ICU on telemetry post-TNK.  Echocardiogram Normal LV " DIPTI littlejohn, no shunting.  Neurology recommended MRI/MRV of brain which did not show any dural venous thrombosis or stenosis.  MRI did show acute R MCA infarct in right insula, R external capsule, R frontal lobe, and R parietal lobe.  His symptoms did improve after admission but still with left sided weakness.  Speech therapy cleared him for regular diet with thin liquids but with compensatory strategies.  Neurology recommended outpatient 30 day event monitor and plavix for 3 weeks and ASA 81 mg indefinitely.    Consultations This Hospital Stay   NEUROLOGY IP STROKE CONSULT  SPEECH LANGUAGE PATH ADULT IP CONSULT  PHARMACY IP CONSULT  PHARMACY IP CONSULT  PHARMACY IP CONSULT  PHYSICAL THERAPY ADULT IP CONSULT  OCCUPATIONAL THERAPY ADULT IP CONSULT  REHAB ADMISSIONS LIAISON IP CONSULT  CARE MANAGEMENT / SOCIAL WORK IP CONSULT  SMOKING CESSATION PROGRAM IP CONSULT    Code Status   Full Code    Time Spent on this Encounter   I, Edmar Limon MD, personally saw the patient today and spent greater than 30 minutes discharging this patient.       Edmar Limon MD  [place] [id number]  Phone: [phone]  Fax: [phone]  ______________________________________________________________________    Physical Exam   Vital Signs: Temp: 98.8  F (37.1  C) Temp src: Oral BP: (!) 161/73 (Told the RN) Pulse: 76   Resp: 20 SpO2: 97 % O2 Device: None (Room air)    Weight: 157 lbs 13.59 oz  The patient was interviewed and examined on the day of discharge.  Left facial droop was subtle on discharge.  Motor 5-/5 on LUE and proximal LLE but 5/5 elsewhere.  Sensory subjectively decreased on LUE.         Primary Care Physician   Mae Jones    Discharge Orders      Physical Therapy Referral      Reason for your hospital stay    Acute stroke     Follow-up and recommended labs and tests     Follow up with primary care provider, Mae Jones, within 7 days for  hospital follow- up.  No follow up labs or test are needed.  Follow up with [facility] neurology in 2 months     Activity    Your activity upon discharge: activity as tolerated     Cardiac Event Monitor Adult/Pediatric     Diet    Follow this diet upon discharge: Orders Placed This Encounter      Regular Diet Adult Thin Liquids (level 0)     Stroke Hospital Follow Up    Please be aware that coverage of these services is subject to the terms and limitations of your health insurance plan.  Call member services at your health plan with any benefit or coverage questions.  [facility] you to coordinate care as prescribed by your provider. If you don t hear from a representative within 2 business days, please call [phone].         Significant Results and Procedures   Results for orders placed or performed during the hospital encounter of 12/09/23   CTA Head Neck with Contrast    Narrative    EXAM: CTA HEAD NECK W CONTRAST  LOCATION: [facility]  DATE: 12/9/2023    INDICATION: Code Stroke to evaluate for potential thrombolysis and thrombectomy. Left-sided weakness, left facial droop.  COMPARISON: None.  CONTRAST: Isovue 370 75ml  TECHNIQUE: Head and neck CT angiogram with IV contrast. Noncontrast head CT followed by axial helical CT images of the head and neck vessels obtained during the arterial phase of intravenous contrast administration. Axial 2D reconstructed images and   multiplanar 3D MIP reconstructed images of the head and neck vessels were performed by the technologist. Dose reduction techniques were used. All stenosis measurements made according to NASCET criteria unless otherwise specified.    FINDINGS:   NONCONTRAST HEAD CT:   INTRACRANIAL CONTENTS: No intracranial hemorrhage, extraaxial collection, or mass effect.  No CT evidence of acute territorial infarct. Mild presumed chronic small vessel ischemic changes. Mild generalized volume loss. No  hydrocephalus.     Bilateral carotid siphon and vertebral artery V4 segment atherosclerotic calcifications.    VISUALIZED ORBITS/SINUSES/MASTOIDS: No intraorbital abnormality. Scattered paranasal sinus mucosal thickening, greatest in the right maxillary sinus. No middle ear or mastoid effusion.    BONES/SOFT TISSUES: No acute abnormality.    HEAD CTA:  ANTERIOR CIRCULATION: No definite stenosis/occlusion. However, there is somewhat decreased vessel arborization in the region of the right frontal operculum. No aneurysm or high flow vascular malformation. Developmentally hypoplastic left A1 anterior   cerebral artery segment. The left A2 segment receives flow from the right-sided circulation via the anterior communicating artery.    POSTERIOR CIRCULATION: No stenosis/occlusion, aneurysm, or high flow vascular malformation. Balanced vertebral arteries supply a normal basilar artery.     DURAL VENOUS SINUSES: Enhancement is present throughout the major dural venous sinuses. However, degree of enhancement in the inferior aspect of the superior sagittal sinus is somewhat reduced compared to enhancement in the superior aspect of the sinus   and the straight sinus.    NECK CTA:  RIGHT CAROTID: Atherosclerotic plaque results in less than 50% stenosis in the right ICA. No dissection. Retropharyngeal course of the right ICA.    LEFT CAROTID: Atherosclerotic plaque results in less than 50% stenosis in the left ICA. No dissection.    VERTEBRAL ARTERIES: No focal stenosis or dissection. Balanced vertebral arteries.    AORTIC ARCH: Classic aortic arch anatomy with no significant stenosis at the origin of the great vessels.    NONVASCULAR STRUCTURES: Multilevel cervical spine degenerative changes.      Impression    IMPRESSION:   HEAD CT:  1.  No acute territorial infarction.    HEAD CTA:   1.  No large vessel occlusion is identified. However, there is somewhat decreased vessel arborization in the region of the right frontal  operculum, which may reflect occlusion of a distal M2/M3 branch. MRI is recommended for further evaluation.  2.  Enhancement is present throughout the dural venous sinuses. However, there is somewhat reduced enhancement in the inferior aspect of the superior sagittal sinus compared to adjacent sinuses. This is favored to be technique related as opposed to   reflecting a nonocclusive thrombus. CTV or MRV can be performed for further evaluation if clinically warranted.    NECK CTA:  1.  No hemodynamically significant stenosis in the neck vessels.   2.  No evidence for dissection.    Findings were discussed with Dr. De La Fuente via telephone at 8:58 PM on 12/09/2023.   Cervical spine CT w/o contrast    Narrative    EXAM: CT CERVICAL SPINE W/O CONTRAST  LOCATION: [facility]  DATE: 12/9/2023    INDICATION: Fall. Traumatic injury.  COMPARISON: None.  TECHNIQUE: Routine CT Cervical Spine without IV contrast. Multiplanar reformats. Dose reduction techniques were used.    FINDINGS:  VERTEBRA: Normal vertebral body heights. Straightening of the normal cervical lordosis. No fracture or posttraumatic subluxation.     CANAL/FORAMINA: Multilevel degenerative changes. Disc osteophyte complexes/bulges are present at the levels of C4-C5, C5-C6 and C6-C7 where there is up to mild canal stenosis. At C2-C3 there is moderate right neural foraminal status. At C4-C5 there is   moderate to severe bilateral neural foraminal stenosis. At C5-C6 there is mild right and severe left neural foraminal stenosis. At C6-C7 there is moderate left neural foraminal stenosis.    PARASPINAL: No extraspinal abnormality.      Impression    IMPRESSION:  1.  No fracture or posttraumatic subluxation.  2.  Degenerative changes, as above.   MR Brain w/o Contrast    Narrative    EXAM: MR BRAIN W/O CONTRAST  LOCATION: [facility]  DATE: 12/10/2023    INDICATION: Acute ischemic stroke.  COMPARISON: CTA dated  12/09/2023.  TECHNIQUE: Routine multiplanar multisequence head MRI without intravenous contrast.    FINDINGS:  INTRACRANIAL CONTENTS: There is patchy abnormal diffusion restriction identified involving the right insula. Additionally, there is linear band of abnormal diffusion restriction identified involving the right capsular region as well as along the cortices   of the right posterior frontal lobe and right parietal lobe, in keeping with acute/subacute ischemic injury. There is associated T2/FLAIR signal hyperintensity in these regions. No hemorrhagic conversion. No mass, acute hemorrhage, or extra-axial fluid   collections. Scattered nonspecific T2/FLAIR hyperintensities within the cerebral white matter most consistent with mild chronic microvascular ischemic change. Mild generalized cerebral atrophy. No hydrocephalus. Normal position of the cerebellar tonsils.       SELLA: No abnormality accounting for technique.    OSSEOUS STRUCTURES/SOFT TISSUES: Normal marrow signal. The major intracranial vascular flow voids are maintained.     ORBITS: No abnormality accounting for technique.     SINUSES/MASTOIDS: Mild ethmoid air cell mucosal thickening. Moderate right maxillary sinus mucosal thickening. No middle ear or mastoid effusion.       Impression    IMPRESSION:  1.  Acute right MCA territory infarct identified involving the right insula, right external capsule, right frontal lobe and right parietal lobe, as above. No hemorrhagic conversion or significant mass effect.  2.  Chronic senescent and presumed microvascular ischemic changes, as above.   MRV Brain wo Contrast    Narrative    EXAM: MRV BRAIN W/O CONTRAST  LOCATION: [facility]  DATE: 12/10/2023    INDICATION: eval for superior sagittal sinus thrombus  COMPARISON: CTA head neck 12/09/2023  TECHNIQUE: Phase contrast and 2-D time-of-flight head MRV without intravenous contrast.    FINDINGS:  DURAL SINUSES: No significant stenosis or  occlusion. 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    INTERNAL CEREBRAL VEINS: No significant stenosis or occlusion.      MAJOR CORTICAL VENOUS BRANCHES: No significant stenosis or occlusion.      Impression    IMPRESSION:  1.  No dural venous sinus thrombosis or significant stenosis.   Echocardiogram Complete - For age > 60 yrs    Narrative    [id number]  [id number]  [id number]  [id number]^BARTOLOME^[place]E^[place] [address]     Name: ULISES MOSQUERA  MRN: [id number]  : 1955  Study Date: 12/10/2023 07:57 AM  Age: 68 yrs  Gender: Male  Patient Location: [facility]  Reason For Study: Cerebrovascular Incident  Ordering Physician: MOE MANCUSO  Performed By: TETO     BSA: 1.8 m2  Height: 66 in  Weight: 156 lb  HR: 91  BP: 136/64 mmHg  ______________________________________________________________________________  Procedure  Complete Echo Adult. Definity (NDC #52983-512) given intravenously.  ______________________________________________________________________________  Interpretation Summary     1. The left ventricle is normal in size. Left ventricular systolic performance  is hyperdynamic with a visually estimated ejection fraction of 75%.  2. No significant valvular heart disease is identified on this study.  3. Normal right ventricular size and systolic performance.  4. There is mild left atrial enlargement.  5. Echo contrast examination was performed using agitated NS as contrast  agent. The right heart opacity was good and the left heart was well  visualized. There was no evidence of right to left shunting during spontaneous  respiration or following release of Valsalva.  ______________________________________________________________________________  Left ventricle:  The left ventricle is normal in size. Left ventricular systolic performance is  hyperdynamic with a visually estimated ejection fraction of 75%. There is  normal regional wall  motion. Left ventricular wall thickness is normal.     Assessment of LV Diastolic Function: The cumulative findings suggest normal  diastolic filling [The septal e' velocity is > 7 cm/s & lateral e' velocity  is  > 10 cm/s. The average E/e' is < 14. TR velocity is < 2.8 m/s. Left atrial  volume index is greater than 34 mL/mÂ ].     Right ventricle:  Normal right ventricular size and systolic performance.     Left atrium:  There is mild left atrial enlargement.     Right atrium:  The right atrium is of normal size.     IVC:  The IVC is of normal caliber.     Aortic valve:  The aortic valve is comprised of three cusps. No significant aortic stenosis  or aortic insufficiency is detected on this study (there is mild increased  velocities through the aortic valve though undoubtedly related to the  hyperdynamic state of the ventricle).     Mitral valve:  The mitral valve appears morphologically normal. There is trace mitral  insufficiency. Parenthetically, the mean gradient across the mitral valve is  mildly increased though undoubtedly is a reflection of the hyperdynamic state  of the left ventricle. There appears to be normal mitral valve leaflet  excursion on two-dimensional imaging.     Tricuspid valve:  The tricuspid valve is grossly morphologically normal. There is trace  tricuspid insufficiency.     Pulmonic valve:  The pulmonic valve is grossly morphologically normal.     Thoracic aorta:  The aortic root and proximal ascending aorta are of normal dimension.     Pericardium:  There is no significant pericardial effusion.  ______________________________________________________________________________  ______________________________________________________________________________  MMode/2D Measurements & Calculations  IVSd: 1.3 cm  LVIDd: 4.0 cm  LVIDs: 2.7 cm  LVPWd: 1.3 cm  FS: 31.5 %  LV mass(C)d: 189.8 grams  LV mass(C)dI: 105.5 grams/m2  Ao root diam: 3.8 cm  asc Aorta Diam: 3.5 cm  LVOT diam: 2.2 cm  LVOT area:  3.8 cm2  Ao root diam index Ht(cm/m): 2.3  Ao root diam index BSA (cm/m2): 2.1  Asc Ao diam index BSA (cm/m2): 1.9  Asc Ao diam index Ht(cm/m): 2.1     LA Volume Indexed (AL/bp): 42.4 ml/m2  RWT: 0.66  TAPSE: 2.1 cm     Time Measurements  MM HR: 92.0 BPM     Doppler Measurements & Calculations  MV E max mars: 152.0 cm/sec  MV A max mars: 156.0 cm/sec  MV E/A: 0.97  MV max P.3 mmHg  MV mean P.0 mmHg  MV V2 VTI: 39.0 cm  MVA(VTI): 3.7 cm2  Ao V2 max: 234.0 cm/sec  Ao max P.0 mmHg  Ao V2 mean: 161.0 cm/sec  Ao mean P.0 mmHg  Ao V2 VTI: 53.7 cm  MIRTHA(I,D): 2.7 cm2  MIRTHA(V,D): 2.6 cm2  LV V1 max PG: 10.6 mmHg  LV V1 max: 163.0 cm/sec  LV V1 VTI: 38.1 cm  SV(LVOT): 144.8 ml  SI(LVOT): 80.5 ml/m2     PA V2 max: 148.5 cm/sec  PA max P.8 mmHg  PA acc time: 0.11 sec  TR max mars: 264.0 cm/sec  TR max P.9 mmHg  AV Mars Ratio (DI): 0.70  MIRTHA Index (cm2/m2): 1.5  E/E': 19.7  E/E' avg: 15.6  Lateral E/e': 11.6  Medial E/e': 19.7  Peak E' Mars: 7.7 cm/sec  RV S Mars: 18.8 cm/sec     ______________________________________________________________________________  Report approved by: Tita Trujillo 12/10/2023 11:41 AM             Discharge Medications   Current Discharge Medication List        START taking these medications    Details   aspirin (ASA) 81 MG chewable tablet Take 1 tablet (81 mg) by mouth daily    Associated Diagnoses: Acute right arterial ischemic stroke, MCA (middle cerebral artery) (H)      clopidogrel (PLAVIX) 75 MG tablet Take 1 tablet (75 mg) by mouth daily For 3 weeks then stop  Qty: 21 tablet, Refills: 0    Associated Diagnoses: Acute right arterial ischemic stroke, MCA (middle cerebral artery) (H)           CONTINUE these medications which have CHANGED    Details   losartan (COZAAR) 100 MG tablet Take 1 tablet (100 mg) by mouth daily Restart on 23    Associated Diagnoses: Benign essential hypertension           CONTINUE these medications which have NOT CHANGED    Details    Acetaminophen (TYLENOL EXTRA STRENGTH PO) Take 1,000 mg by mouth daily as needed       Ascorbic Acid (VITAMIN C PO)       benzonatate (TESSALON) 100 MG capsule Take 1 capsule (100 mg) by mouth 3 times daily as needed for cough  Qty: 21 capsule, Refills: 0    Associated Diagnoses: Viral URI with cough      fluticasone (FLONASE) 50 MCG/ACT nasal spray SPRAY 1 SPRAY IN EACH NOSTRIL TWO TIMES A DAY PRN  Qty: 16 g, Refills: 11    Associated Diagnoses: Rhinitis, unspecified type      metoprolol succinate ER (TOPROL XL) 100 MG 24 hr tablet Take 1 tablet (100 mg) by mouth daily  Qty: 90 tablet, Refills: 3    Associated Diagnoses: Essential hypertension, benign      VITAMIN D PO       zolpidem ER (AMBIEN CR) 12.5 MG CR tablet Take 1 tablet (12.5 mg) by mouth nightly as needed for sleep  Qty: 30 tablet, Refills: 5    Comments: This prescription was filled on 8/23/2023. Any refills authorized will be placed on file.  Associated Diagnoses: Primary insomnia           Allergies   Allergies   Allergen Reactions    Lisinopril Diarrhea and Cough    Advil [Antihistamines, Chlorpheniramine-Type] GI Disturbance    Amoxicillin-Pot Clavulanate GI Disturbance     Stomach ache    Azithromycin GI Disturbance     Severe diarrhea and abdominal pain.  side effects, not true allergy    Doxycycline GI Disturbance    Prednisone Other (See Comments)     Insomnia, Facial flushing, Sweating    Sulfa Antibiotics Hives

## 2023-12-13 ENCOUNTER — PATIENT OUTREACH (OUTPATIENT)
Dept: CARE COORDINATION | Facility: CLINIC | Age: 68
End: 2023-12-13
Payer: COMMERCIAL

## 2023-12-13 NOTE — PROGRESS NOTES
"  [facility]: [facility]: Post-Discharge Note  SITUATION                                                      Admission:    Admission Date: 12/09/23   Reason for Admission: Acute stroke  Discharge:   Discharge Date: 12/12/23  Discharge Diagnosis: Acute stroke    BACKGROUND                                                      Per hospital discharge summary and inpatient provider notes:      Donnie Ernandez is a 68 year old male who was brought to the ED by ambulance for evaluation of left-sided weakness, slurred speech, found on the floor in front of the recliner by wife.  Most of the history is obtained by the patient's daughters at bedside.  Patient has ongoing dysarthria with left-sided weakness, however, awake and oriented.  Past medical history of hypertension, CKD, anemia, NILDA, GERD, chronic diarrhea, gallbladder polyp, chronic low back pain, depression.  Reportedly, patient drinks a few beers and 2 shots of whiskey daily.  No history of tobacco or drug use.  Patient was drinking today and fell off the recliner unable to get up.  He was noted with slurred speech and weakness of the left side.  He was brought to the ED where initially he had some improvement of symptoms, however worsened while on CT.  Stroke neurologist recommended TNK.  Patient complains of chronic low back and hip pain.  He denied chest pain, shortness of breath, palpitations.      ASSESSMENT           Discharge Assessment  How are you doing now that you are home?: \"I'm doing better, someone is supposed to be coming out tomorrow for therapy\"  How are your symptoms? (Red Flag symptoms escalate to triage hotline per guidelines): Improved  Do you feel your condition is stable enough to be safe at home until your provider visit?: Yes  Does the patient have their discharge instructions? : Yes  Does the patient have questions regarding their discharge instructions? : No  Were you started on any new medications or were there " changes to any of your previous medications? : Yes  Does the patient have all of their medications?: Yes  Do you have questions regarding any of your medications? : No  Do you have all of your needed medical supplies or equipment (DME)?  (i.e. oxygen tank, CPAP, cane, etc.): Yes  Discharge follow-up appointment scheduled within 14 calendar days? : Yes  Discharge Follow Up Appointment Date: 12/29/23  Discharge Follow Up Appointment Scheduled with?: Specialty Care Provider                  PLAN                                                      Outpatient Plan:  Follow up with primary care provider, Mae Jones, within 7 days for  hospital follow- up. No follow up labs or test are needed.  Follow up with [facility] neurology in 2 months    Future Appointments   Date Time Provider Department Center   12/20/2023 11:00 AM Mae Jones APRN CNP [facility]   12/29/2023  1:00 PM Yue Virk, PT [facility]         For any urgent concerns, please contact our 24 hour nurse triage line: [phone] ([phone])         Lianne Gomes  Community Health Worker  [facility]  [phone]

## 2023-12-14 ENCOUNTER — TELEPHONE (OUTPATIENT)
Dept: FAMILY MEDICINE | Facility: CLINIC | Age: 68
End: 2023-12-14
Payer: COMMERCIAL

## 2023-12-14 DIAGNOSIS — I10 BENIGN ESSENTIAL HYPERTENSION: ICD-10-CM

## 2023-12-14 RX ORDER — LOSARTAN POTASSIUM 100 MG/1
100 TABLET ORAL DAILY
Qty: 90 TABLET | Refills: 3 | OUTPATIENT
Start: 2023-12-14

## 2023-12-15 DIAGNOSIS — I10 BENIGN ESSENTIAL HYPERTENSION: ICD-10-CM

## 2023-12-15 RX ORDER — LOSARTAN POTASSIUM 100 MG/1
100 TABLET ORAL DAILY
Qty: 90 TABLET | Refills: 0 | Status: SHIPPED | OUTPATIENT
Start: 2023-12-15 | End: 2023-12-20

## 2023-12-15 NOTE — TELEPHONE ENCOUNTER
Daughter is calling to request refills on Losartan refill. No consent to communicate is on file. Patient gave verbal permission to speak with daughter Priscilla.    Patient was discharged from the ICU. According to AVS patient to restart Losartan 100 mg daily on 12/14/23.   In review of medication list, Dr. Barrientos did order medication to be restarted however it was not sent to pharmacy and no amount or refills are noted.    Will route to PCP Melissa Jones.    Patient requesting that he be called at [phone] when refill is ordered.    Thank you,  Roya Banks RN

## 2023-12-15 NOTE — TELEPHONE ENCOUNTER
Forms completed and faxed to the number on the forms. Sent to scanning and in file cabinet. Left message for patient to  forms at the .  Jennifer JAMES on 12/15/2023 at 2:59 PM

## 2023-12-19 ENCOUNTER — PATIENT OUTREACH (OUTPATIENT)
Dept: CARE COORDINATION | Facility: CLINIC | Age: 68
End: 2023-12-19
Payer: COMMERCIAL

## 2023-12-19 ASSESSMENT — ACTIVITIES OF DAILY LIVING (ADL): DEPENDENT_IADLS:: INDEPENDENT

## 2023-12-19 NOTE — PROGRESS NOTES
Clinic Care Coordination Contact  Clinic Care Coordination Contact  OUTREACH    Referral Information:  Referral Source: Care Team    Primary Diagnosis: Neurological Disorders    Chief Complaint   Patient presents with    Clinic Care Coordination - Initial     Clinic Care Coordination RN         Universal Utilization: 12/9/2023 - 12/12/2023 (3 days)  [facility]     Edmar Limon MD  Last attending  Treatment team Acute right arterial ischemic stroke, MCA (middle cerebral artery) (H)  Principal problem     Clinic Utilization  Difficulty keeping appointments:: No  Compliance Concerns: No  No-Show Concerns: No  No PCP office visit in Past Year: No  Utilization      No Show Count (past year)  0             ED Visits  1             Hospital Admissions  1                    Current as of: 12/18/2023  3:47 PM                Clinical Concerns:  Current Medical Concerns:  Patient continues [facility] for PT.  Patient is walking independently and no further left sided weakness or slurred speech.  Patient has a Neurology appointment 3/12.  Patient is on shorterm disability and is anxious to get back to work   Patient does injection molding for a job     Current Behavioral Concerns: No    Education Provided to patient: CC introductory letter and care plan mailed    Pain  Pain (GOAL):: No  Health Maintenance Reviewed: Not assessed  Clinical Pathway: None    Medication Management:  Medication review status: Medications reviewed.  Changes noted per patient report.       Functional Status:  Dependent ADLs:: Independent  Dependent IADLs:: Independent  Bed or wheelchair confined:: No  Mobility Status: Independent    Living Situation:  Current living arrangement:: I live in a private home    Lifestyle & Psychosocial Needs:    Social Determinants of Health     Food Insecurity: Low Risk  (11/1/2023)    Food Insecurity     Within the past 12 months, did you worry that your food would run out before you got money  to buy more?: No     Within the past 12 months, did the food you bought just not last and you didn t have money to get more?: No   Depression: Not at risk (3/16/2023)    PHQ-2     PHQ-2 Score: 0   Housing Stability: Low Risk  (11/1/2023)    Housing Stability     Do you have housing? : Yes     Are you worried about losing your housing?: No   Tobacco Use: Low Risk  (11/1/2023)    Patient History     Smoking Tobacco Use: Never     Smokeless Tobacco Use: Never     Passive Exposure: Not on file   Financial Resource Strain: Low Risk  (11/1/2023)    Financial Resource Strain     Within the past 12 months, have you or your family members you live with been unable to get utilities (heat, electricity) when it was really needed?: No   Alcohol Use: Not on file   Transportation Needs: Low Risk  (11/1/2023)    Transportation Needs     Within the past 12 months, has lack of transportation kept you from medical appointments, getting your medicines, non-medical meetings or appointments, work, or from getting things that you need?: No   Physical Activity: Not on file   Interpersonal Safety: Not on file   Stress: Not on file   Social Connections: Not on file     Diet:: No added salt  Inadequate nutrition (GOAL):: No  Tube Feeding: No  Inadequate activity/exercise (GOAL):: Yes  Significant changes in sleep pattern (GOAL): No  Transportation means:: Family     Bahai or spiritual beliefs that impact treatment:: No  Mental health DX:: No  Mental health management concern (GOAL):: No  Chemical Dependency Status: Past Concern  Informal Support system:: Children             Resources and Interventions:  Current Resources:      Community Resources: None  Supplies Currently Used at Home: None  Equipment Currently Used at Home: none  Employment Status: employed full-time         Advance Care Plan/Directive  Advanced Care Plans/Directives on file:: No    Referrals Placed: None         Care Plan:  Care Plan: Physical Activity       Problem:  Patient is inactive       Goal: Increase Physical Activity       Start Date: 12/19/2023 Expected End Date: 2/19/2024    This Visit's Progress: 30%    Priority: High    Note:     Barriers: Deconditioned   Strengths: Motivated   Patient expressed understanding of goal: Yes  Action steps to achieve this goal:  1. I will keep scheduled [facility] home visits   2. I will do home exercises as instructed   3. I will keep provider appointments and take my medications as directed                                Patient/Caregiver understanding: Patient expresses good understanding of discharge instructions     Outreach Frequency: weekly, more frequently as needed  Future Appointments                Tomorrow Mae Jones APRN CNP [facility]  Arrive at: Clinic A    In 1 week Yue Virk PT [facility]    In 2 months aJmes Nieves MD [facility] Neurology Clinic [facility] M[facility]            Plan:   Patient has a hospital follow up with PCP tomorrow   CC RN will follow up in 3-5 business days   [facility]   Cuca Dupree RN, Care Coordinator   [facility]'s   E-mail [email or web address]   [phone]

## 2023-12-19 NOTE — LETTER
[facility] CARE COORDINATION  [address] [facility] [address]     December 19, 2023    Donnie Ernandez  [address] [place] [address] [place] [address]      Dear Donnie,    I am a clinic care coordinator who works with MAYANK Lutz CNP with the [facility]. I wanted to thank you for spending the time to talk with me.  Below is a description of clinic care coordination and how I can further assist you.       The clinic care coordination team is made up of a registered nurse, , financial resource worker and community health worker who understand the health care system. The goal of clinic care coordination is to help you manage your health and improve access to the health care system. Our team works alongside your provider to assist you in determining your health and social needs. We can help you obtain health care and community resources, providing you with necessary information and education. We can work with you through any barriers and develop a care plan that helps coordinate and strengthen the communication between you and your care team.  Our services are voluntary and are offered without charge to you personally.    Please feel free to contact me with any questions or concerns regarding care coordination and what we can offer.      We are focused on providing you with the highest-quality healthcare experience possible.    Sincerely,     [facility]   Cuca Dupree RN, Care Coordinator   [facility]'s   E-mail [email or web address]   [phone]     Enclosed: I have enclosed a copy of the Patient Centered Plan of Care. This has helpful information and goals that we have talked about. Please keep this in an easy to access place to use as needed.

## 2023-12-20 ENCOUNTER — OFFICE VISIT (OUTPATIENT)
Dept: FAMILY MEDICINE | Facility: CLINIC | Age: 68
End: 2023-12-20
Payer: COMMERCIAL

## 2023-12-20 VITALS
TEMPERATURE: 98.3 F | SYSTOLIC BLOOD PRESSURE: 134 MMHG | RESPIRATION RATE: 14 BRPM | OXYGEN SATURATION: 97 % | BODY MASS INDEX: 25.55 KG/M2 | DIASTOLIC BLOOD PRESSURE: 54 MMHG | HEART RATE: 48 BPM | HEIGHT: 66 IN | WEIGHT: 159 LBS

## 2023-12-20 DIAGNOSIS — I63.511 ACUTE RIGHT ARTERIAL ISCHEMIC STROKE, MCA (MIDDLE CEREBRAL ARTERY) (H): Primary | ICD-10-CM

## 2023-12-20 DIAGNOSIS — F10.10 ALCOHOL ABUSE: ICD-10-CM

## 2023-12-20 DIAGNOSIS — Z12.5 SCREENING FOR PROSTATE CANCER: ICD-10-CM

## 2023-12-20 DIAGNOSIS — I10 BENIGN ESSENTIAL HYPERTENSION: ICD-10-CM

## 2023-12-20 DIAGNOSIS — Z09 HOSPITAL DISCHARGE FOLLOW-UP: ICD-10-CM

## 2023-12-20 LAB
ANION GAP SERPL CALCULATED.3IONS-SCNC: 11 MMOL/L (ref 7–15)
BUN SERPL-MCNC: 23 MG/DL (ref 8–23)
CALCIUM SERPL-MCNC: 9 MG/DL (ref 8.8–10.2)
CHLORIDE SERPL-SCNC: 110 MMOL/L (ref 98–107)
CREAT SERPL-MCNC: 1.75 MG/DL (ref 0.67–1.17)
DEPRECATED HCO3 PLAS-SCNC: 19 MMOL/L (ref 22–29)
EGFRCR SERPLBLD CKD-EPI 2021: 42 ML/MIN/1.73M2
ERYTHROCYTE [DISTWIDTH] IN BLOOD BY AUTOMATED COUNT: 13 % (ref 10–15)
GLUCOSE SERPL-MCNC: 91 MG/DL (ref 70–99)
HCT VFR BLD AUTO: 31.9 % (ref 40–53)
HGB BLD-MCNC: 10.1 G/DL (ref 13.3–17.7)
MCH RBC QN AUTO: 31 PG (ref 26.5–33)
MCHC RBC AUTO-ENTMCNC: 31.7 G/DL (ref 31.5–36.5)
MCV RBC AUTO: 98 FL (ref 78–100)
PLATELET # BLD AUTO: 455 10E3/UL (ref 150–450)
POTASSIUM SERPL-SCNC: 5 MMOL/L (ref 3.4–5.3)
PSA SERPL DL<=0.01 NG/ML-MCNC: 4.43 NG/ML (ref 0–4.5)
RBC # BLD AUTO: 3.26 10E6/UL (ref 4.4–5.9)
SODIUM SERPL-SCNC: 140 MMOL/L (ref 135–145)
WBC # BLD AUTO: 10.7 10E3/UL (ref 4–11)

## 2023-12-20 PROCEDURE — 99495 TRANSJ CARE MGMT MOD F2F 14D: CPT | Performed by: NURSE PRACTITIONER

## 2023-12-20 PROCEDURE — G0103 PSA SCREENING: HCPCS | Performed by: NURSE PRACTITIONER

## 2023-12-20 PROCEDURE — 90662 IIV NO PRSV INCREASED AG IM: CPT | Performed by: NURSE PRACTITIONER

## 2023-12-20 PROCEDURE — 90471 IMMUNIZATION ADMIN: CPT | Performed by: NURSE PRACTITIONER

## 2023-12-20 PROCEDURE — 85027 COMPLETE CBC AUTOMATED: CPT | Performed by: NURSE PRACTITIONER

## 2023-12-20 PROCEDURE — 80048 BASIC METABOLIC PNL TOTAL CA: CPT | Performed by: NURSE PRACTITIONER

## 2023-12-20 PROCEDURE — 36415 COLL VENOUS BLD VENIPUNCTURE: CPT | Performed by: NURSE PRACTITIONER

## 2023-12-20 RX ORDER — NALTREXONE HYDROCHLORIDE 50 MG/1
50 TABLET, FILM COATED ORAL DAILY
Qty: 90 TABLET | Refills: 3 | Status: SHIPPED | OUTPATIENT
Start: 2023-12-20

## 2023-12-20 RX ORDER — METOPROLOL SUCCINATE 100 MG/1
100 TABLET, EXTENDED RELEASE ORAL DAILY
Qty: 90 TABLET | Refills: 3 | Status: SHIPPED | OUTPATIENT
Start: 2023-12-20

## 2023-12-20 RX ORDER — LOSARTAN POTASSIUM 100 MG/1
100 TABLET ORAL DAILY
Qty: 90 TABLET | Refills: 3 | Status: SHIPPED | OUTPATIENT
Start: 2023-12-20

## 2023-12-20 ASSESSMENT — ANXIETY QUESTIONNAIRES
2. NOT BEING ABLE TO STOP OR CONTROL WORRYING: SEVERAL DAYS
IF YOU CHECKED OFF ANY PROBLEMS ON THIS QUESTIONNAIRE, HOW DIFFICULT HAVE THESE PROBLEMS MADE IT FOR YOU TO DO YOUR WORK, TAKE CARE OF THINGS AT HOME, OR GET ALONG WITH OTHER PEOPLE: NOT DIFFICULT AT ALL
6. BECOMING EASILY ANNOYED OR IRRITABLE: NOT AT ALL
3. WORRYING TOO MUCH ABOUT DIFFERENT THINGS: SEVERAL DAYS
1. FEELING NERVOUS, ANXIOUS, OR ON EDGE: NOT AT ALL
GAD7 TOTAL SCORE: 4
5. BEING SO RESTLESS THAT IT IS HARD TO SIT STILL: SEVERAL DAYS
7. FEELING AFRAID AS IF SOMETHING AWFUL MIGHT HAPPEN: NOT AT ALL
GAD7 TOTAL SCORE: 4

## 2023-12-20 ASSESSMENT — PAIN SCALES - GENERAL: PAINLEVEL: NO PAIN (0)

## 2023-12-20 ASSESSMENT — PATIENT HEALTH QUESTIONNAIRE - PHQ9
5. POOR APPETITE OR OVEREATING: SEVERAL DAYS
SUM OF ALL RESPONSES TO PHQ QUESTIONS 1-9: 1

## 2023-12-20 NOTE — COMMUNITY RESOURCES LIST (ENGLISH)
12/20/2023   [facility] Outpatient Clinics  N/A  For additional resource needs, please contact your health insurance member services or your primary care team.  Phone: [phone]   Email: N/A   Address: [address] [place] [address]   Hours: N/A        Food and Nutrition       Food pantry  1  [employer] [place] [employer] Distance: 7.72 miles      In-Person   [address] [place] [address]  Language: English  Hours: Sat 8:30 AM - 11:30 AM  Fees: Free   Phone: (856) 681-1218 Email: contact@[employer] Website: http://[employer]/     2  [facility] Distance: 8.25 miles      Delivery, Pickup   [address] [place] [address]  Language: English  Hours: Mon - Thu 8:00 AM - 3:00 PM  Fees: Free   Phone: (872) 512-1711 Website: http://www.[facility].org/     SNAP application assistance  3  [facility] Economic Assistance Department Distance: 20.77 miles      Phone/Virtual   [address] [address]  Language: English  Hours: Mon - Fri 8:15 AM - 4:00 PM  Fees: Free   Phone: (859) 371-2672 Email: paperwork@[facility].mn. Website: http://www.[facility]./193/Economic-Assistance     Soup kitchen or free meals  4  [facility] Table Meals - Family Table Meal Distance: 19.91 miles      Pickup   [address] [place] [address]  Language: English  Hours: Thu 5:00 PM - 6:30 PM  Fees: Free   Phone: (569) 871-9002 Email: [employer]@[facility].[employer] Website: http://www.[facility].org     5  [facility] - Family Table Meal Distance: 20.29 miles      In-Person, Pickup   [address] [place] [address]  Language: English  Hours: Thu 5:30 PM - 6:30 PM  Fees: Free   Phone: (914) 304-6868 Email: office@[facility].org Website: http://www.[facility] [employer].org          Important Numbers & Websites       [address].org  Poison Control    (898) 791-2987 [place]  Suicide and Crisis Lifeline   988 988lifeline.org  Childhelp [place] Child Abuse Hotline   678.225.6065 Childhelphotline.org  National Sexual Assault Hotline   (494) 799-2867 (HOPE) Rainn.org  [place] Runaway Safeline   (233) 706-4181 (RUNAWAY) 1800runaway.org  Pregnancy & Postpartum Support [place]   Call/text 213-481-5294 Ppsupport[place].org  Substance Abuse National Helpline ([facility]   925-272-HELP (4111) Findtreatment.gov  Emergency Services   910

## 2023-12-20 NOTE — PROGRESS NOTES
"  Assessment & Plan     Acute right arterial ischemic stroke, MCA (middle cerebral artery) (H)  Continue aspirin and Plavix for total of 3 weeks, then transition to aspirin alone.  Continue home therapies until discharge to outpatient therapies.  Neurology follow-up as already scheduled.  - Stroke Hospital Follow Up  - CBC with platelets; Future  - Basic metabolic panel  (Ca, Cl, CO2, Creat, Gluc, K, Na, BUN); Future  - CBC with platelets  - Basic metabolic panel  (Ca, Cl, CO2, Creat, Gluc, K, Na, BUN)    Benign essential hypertension  Well-controlled.  Continue current medications.  - losartan (COZAAR) 100 MG tablet; Take 1 tablet (100 mg) by mouth daily  - metoprolol succinate ER (TOPROL XL) 100 MG 24 hr tablet; Take 1 tablet (100 mg) by mouth daily  - CBC with platelets; Future  - Basic metabolic panel  (Ca, Cl, CO2, Creat, Gluc, K, Na, BUN); Future  - CBC with platelets  - Basic metabolic panel  (Ca, Cl, CO2, Creat, Gluc, K, Na, BUN)    Alcohol abuse  Patient has not had any alcohol since discharge.  He is worried about cravings and is interested in medication.  Start naltrexone 50 mg daily  - naltrexone (DEPADE/REVIA) 50 MG tablet; Take 1 tablet (50 mg) by mouth daily  - CBC with platelets; Future  - Basic metabolic panel  (Ca, Cl, CO2, Creat, Gluc, K, Na, BUN); Future  - CBC with platelets  - Basic metabolic panel  (Ca, Cl, CO2, Creat, Gluc, K, Na, BUN)    Hospital discharge follow-up  Hemoglobin has improved, white blood cell count was normal.  Creatinine was mildly elevated again today.  Discussed hydration and avoiding NSAIDs.  Recheck in 1 week.    Screening for prostate cancer  - PSA, screen; Future  - PSA, screen       MED REC REQUIRED  Post Medication Reconciliation Status: discharge medications reconciled and changed, per note/orders  BMI:   Estimated body mass index is 25.66 kg/m  as calculated from the following:    Height as of this encounter: 1.676 m (5' 6\").    Weight as of this encounter: 72.1 kg " "(159 lb).         The risks, benefits and treatment options of prescribed medications or other treatments have been discussed with the patient. The patient verbalized their understanding and should call or follow up if no improvement or if they develop further problems.  MAYANK Lutz CNP  [facility]            Russ Fields is a 68 year old, presenting for the following health issues:  Hospital F/U and Forms (Return to work form to fill out.)        12/20/2023    10:28 AM   Additional Questions   Roomed by Juanita SOLIZ   Accompanied by daughterUziel         12/20/2023    10:28 AM   Patient Reported Additional Medications   Patient reports taking the following new medications Multivitamin      HPI         12/13/2023     3:45 PM   Post Discharge Outreach   Admission Date 12/9/2023   Reason for Admission Acute stroke   Discharge Date 12/12/2023   Discharge Diagnosis Acute stroke   How are you doing now that you are home? \"I'm doing better, someone is supposed to be coming out tomorrow for therapy\"   How are your symptoms? (Red Flag symptoms escalate to triage hotline per guidelines) Improved   Do you feel your condition is stable enough to be safe at home until your provider visit? Yes   Does the patient have their discharge instructions?  Yes   Does the patient have questions regarding their discharge instructions?  No   Were you started on any new medications or were there changes to any of your previous medications?  Yes   Does the patient have all of their medications? Yes   Do you have questions regarding any of your medications?  No   Do you have all of your needed medical supplies or equipment (DME)?  (i.e. oxygen tank, CPAP, cane, etc.) Yes   Discharge follow-up appointment scheduled within 14 calendar days?  Yes   Discharge Follow Up Appointment Date 12/29/2023   Discharge Follow Up Appointment Scheduled with? Specialty Care Provider     Hospital Follow-up " Visit:    Hospital/Nursing Home/IP Rehab Facility: [facility]  Date of Admission: 12/09/23  Date of Discharge: 12/12/23  Reason(s) for Admission: Acute right arterial ischemic stroke, MCA (middle cerebral artery) (H)     Was your hospitalization related to COVID-19? No   Problems taking medications regularly:  None  Medication changes since discharge: None  Problems adhering to non-medication therapy:  None    Summary of hospitalization:  Discharge summary unavailable  Diagnostic Tests/Treatments reviewed.  Follow up needed: none  Other Healthcare Providers Involved in Patient s Care:         Homecare and Specialist appointment - neurology appointment scheduled for March  Update since discharge: improved.       HPI: 68-year-old male brought into the ER via EMS on December 9 for acute left-sided weakness and slurred speech.  Patient was determined to have an acute CVA and was given TNK.  He was admitted to hospital and neurology was consulted.  It is also of note the patient has a history of alcohol abuse and his blood alcohol level on admission was 0.15.  He had an acute kidney injury, elevated troponin thought to be related to acute CVA and demand ischemia, anemia, mild leukocytosis, and mildly elevated INR of 1.17.  Patient was discharged on December 12 and improving condition.  He was placed on Plavix and aspirin for 3 weeks, then will be transition to aspirin alone.  He has home therapies at this time; will eventually be transition to outpatient therapies.  Neurology appointment is made for March.    Today patient reports that he is doing well.  He continues to have left upper extremity weakness.  Notices that his left hand fine motor skills are still decreased, for example he cannot hold a pen to write his name at this time.  He feels that his speech and cognition have improved.  He is participating in home therapies and doing the exercises as instructed.  He is eating well.  No bowel  "or bladder issues.  He is taking his medications as prescribed.  He has not had any alcoholic beverages since discharge.  He is concerned about alcohol cravings and is asking if there is a medication he can take to help with cravings.                Plan of care communicated with patient and family                 Review of Systems   Constitutional, HEENT, cardiovascular, pulmonary, GI, , musculoskeletal, neuro, skin, endocrine and psych systems are negative, except as otherwise noted.      Objective    /54   Pulse (!) 48   Temp 98.3  F (36.8  C) (Tympanic)   Resp 14   Ht 1.676 m (5' 6\")   Wt 72.1 kg (159 lb)   SpO2 97%   BMI 25.66 kg/m    Body mass index is 25.66 kg/m .  Physical Exam   GENERAL: healthy, alert and no distress  NECK: no adenopathy, no asymmetry, masses, or scars and thyroid normal to palpation  RESP: lungs clear to auscultation - no rales, rhonchi or wheezes  CV: regular rate and rhythm, normal S1 S2, no S3 or S4, no murmur, click or rub, no peripheral edema and peripheral pulses strong  ABDOMEN: soft, nontender, no hepatosplenomegaly, no masses and bowel sounds normal  MS: Gait normal.  Left lower extremity strength normal 5/5.  Left upper extremity strength diminished 2/5,  strength diminished 2/5.    Results for orders placed or performed in visit on 12/20/23 (from the past 24 hour(s))   PSA, screen   Result Value Ref Range    Prostate Specific Antigen Screen 4.43 0.00 - 4.50 ng/mL    Narrative    This result is obtained using the Roche Elecsys total PSA method on the jann e601 immunoassay analyzer. Results obtained with different assay methods or kits cannot be used interchangeably.   CBC with platelets   Result Value Ref Range    WBC Count 10.7 4.0 - 11.0 10e3/uL    RBC Count 3.26 (L) 4.40 - 5.90 10e6/uL    Hemoglobin 10.1 (L) 13.3 - 17.7 g/dL    Hematocrit 31.9 (L) 40.0 - 53.0 %    MCV 98 78 - 100 fL    MCH 31.0 26.5 - 33.0 pg    MCHC 31.7 31.5 - 36.5 g/dL    RDW 13.0 10.0 " - 15.0 %    Platelet Count 455 (H) 150 - 450 10e3/uL   Basic metabolic panel  (Ca, Cl, CO2, Creat, Gluc, K, Na, BUN)   Result Value Ref Range    Sodium 140 135 - 145 mmol/L    Potassium 5.0 3.4 - 5.3 mmol/L    Chloride 110 (H) 98 - 107 mmol/L    Carbon Dioxide (CO2) 19 (L) 22 - 29 mmol/L    Anion Gap 11 7 - 15 mmol/L    Urea Nitrogen 23.0 8.0 - 23.0 mg/dL    Creatinine 1.75 (H) 0.67 - 1.17 mg/dL    GFR Estimate 42 (L) >60 mL/min/1.73m2    Calcium 9.0 8.8 - 10.2 mg/dL    Glucose 91 70 - 99 mg/dL

## 2023-12-20 NOTE — COMMUNITY RESOURCES LIST (ENGLISH)
12/20/2023   [facility] Outpatient Clinics  N/A  For additional resource needs, please contact your health insurance member services or your primary care team.  Phone: [phone]   Email: N/A   Address: [address] [place] [address]   Hours: N/A        Food and Nutrition       Food pantry  1  [facility] [employer] [facility] [employer] Distance: 7.72 miles      In-Person   [address] [place] [address]  Language: English  Hours: Sat 8:30 AM - 11:30 AM  Fees: Free   Phone: (525) 554-2319 Email: contact@[employer] Website: http://[employer]/     2  [facility]ela [facility]anc Distance: 8.25 miles      Delivery, Pickup   [address] [place] [address]  Language: English  Hours: Mon - Thu 8:00 AM - 3:00 PM  Fees: Free   Phone: (588) 695-3399 Website: http://www.[facility].org/     SNAP application assistance  3  [facility] Economic Assistance Department Distance: 20.77 miles      Phone/Virtual   [address] [address]  Language: English  Hours: Mon - Fri 8:15 AM - 4:00 PM  Fees: Free   Phone: (911) 293-7700 Email: paperwork@[facility].mn. Website: http://www.[facility]./193/Economic-Assistance     Soup kitchen or free meals  4  [facility] Table Meals - Family Table Meal Distance: 19.91 miles      Pickup   [address] [place] [address]  Language: English  Hours: Thu 5:00 PM - 6:30 PM  Fees: Free   Phone: (433) 435-8449 Email: [employer]es[employer]@[facility].[employer] Website: http://www.[facility].org     5  [facility] - Family Table Meal Distance: 20.29 miles      In-Person, Pickup   [address] [place] [address]  Language: English  Hours: Thu 5:30 PM - 6:30 PM  Fees: Free   Phone: (360) 822-7499 Email: office@[place].org Website: http://www.[employer].org          Important Numbers & Websites       [address].org  Poison Control    (195) 404-9411 [place]po[place].org  Suicide and Crisis Lifeline   988 988lifeline.org  Childhelp [place] Child Abuse Hotline   517.262.7171 Childhelphotline.org  National Sexual Assault Hotline   (546) 562-9245 (HOPE) Rainn.org  [place] Runaway Safeline   (170) 766-6324 (RUNAWAY) 1800runaway.org  Pregnancy & Postpartum Support [place]   Call/text 107-149-5688 Ppsupportmn.org  Substance Abuse National Helpline ([facility]   338-930-HELP (8383) Findtreatment.gov  Emergency Services   910

## 2023-12-27 ENCOUNTER — LAB (OUTPATIENT)
Dept: LAB | Facility: CLINIC | Age: 68
End: 2023-12-27
Payer: COMMERCIAL

## 2023-12-27 DIAGNOSIS — I10 BENIGN ESSENTIAL HYPERTENSION: ICD-10-CM

## 2023-12-27 LAB
ANION GAP SERPL CALCULATED.3IONS-SCNC: 11 MMOL/L (ref 7–15)
BUN SERPL-MCNC: 12.3 MG/DL (ref 8–23)
CALCIUM SERPL-MCNC: 8.6 MG/DL (ref 8.8–10.2)
CHLORIDE SERPL-SCNC: 104 MMOL/L (ref 98–107)
CREAT SERPL-MCNC: 1.14 MG/DL (ref 0.67–1.17)
DEPRECATED HCO3 PLAS-SCNC: 22 MMOL/L (ref 22–29)
EGFRCR SERPLBLD CKD-EPI 2021: 70 ML/MIN/1.73M2
GLUCOSE SERPL-MCNC: 98 MG/DL (ref 70–99)
POTASSIUM SERPL-SCNC: 4.3 MMOL/L (ref 3.4–5.3)
SODIUM SERPL-SCNC: 137 MMOL/L (ref 135–145)

## 2023-12-27 PROCEDURE — 36415 COLL VENOUS BLD VENIPUNCTURE: CPT

## 2023-12-27 PROCEDURE — 80048 BASIC METABOLIC PNL TOTAL CA: CPT

## 2023-12-28 ENCOUNTER — PATIENT OUTREACH (OUTPATIENT)
Dept: CARE COORDINATION | Facility: CLINIC | Age: 68
End: 2023-12-28
Payer: COMMERCIAL

## 2023-12-28 ASSESSMENT — ACTIVITIES OF DAILY LIVING (ADL): DEPENDENT_IADLS:: INDEPENDENT

## 2023-12-28 NOTE — PROGRESS NOTES
Clinic Care Coordination Contact  Follow Up Progress Note     Universal Utilization: 12/9/2023 - 12/12/2023 (3 days)  [facility]     Edmar Limon MD  Last attending  Treatment team Acute right arterial ischemic stroke, MCA (middle cerebral artery) (H)  Principal problem        Assessment: Patient is doing very well walking independently  Home care will discharge him today and he will start outpatient therapy tomorrow     Care Gaps:    Health Maintenance Due   Topic Date Due    DEPRESSION ACTION PLAN  Never done    RSV VACCINE (Pregnancy & 60+) (1 - 1-dose 60+ series) Never done    DTAP/TDAP/TD IMMUNIZATION (2 - Td or Tdap) 05/03/2021    COVID-19 Vaccine (6 - 2023-24 season) 09/01/2023    MICROALBUMIN  12/13/2023       Not discussed today     Care Plans  Care Plan: Physical Activity       Problem: Patient is inactive       Goal: Increase Physical Activity       Start Date: 12/19/2023 Expected End Date: 2/19/2024    This Visit's Progress: 50% Recent Progress: 30%    Priority: High    Note:     Barriers: Deconditioned   Strengths: Motivated   Patient expressed understanding of goal: Yes  Action steps to achieve this goal:  1. I will keep scheduled [facility] home visits   2. I will do home exercises as instructed   3. I will keep provider appointments and take my medications as directed                                Intervention/Education provided during outreach: CC RN available for questions or cocnerns     Outreach Frequency: 2 weeks, more frequently as needed        Plan:     Care Coordinator will follow up in 1-2 weeks     [facility]   Cuca Dupree RN, Care Coordinator   [facility]'s   E-mail [email or web address]   [phone]

## 2023-12-29 ENCOUNTER — THERAPY VISIT (OUTPATIENT)
Dept: PHYSICAL THERAPY | Facility: CLINIC | Age: 68
End: 2023-12-29
Attending: INTERNAL MEDICINE
Payer: COMMERCIAL

## 2023-12-29 DIAGNOSIS — R29.90 STROKE-LIKE SYMPTOMS: ICD-10-CM

## 2023-12-29 PROCEDURE — 97112 NEUROMUSCULAR REEDUCATION: CPT | Mod: GP

## 2023-12-29 PROCEDURE — 97110 THERAPEUTIC EXERCISES: CPT | Mod: GP

## 2023-12-29 PROCEDURE — 97161 PT EVAL LOW COMPLEX 20 MIN: CPT | Mod: GP

## 2023-12-29 NOTE — PROGRESS NOTES
PHYSICAL THERAPY EVALUATION  Type of Visit: Evaluation    See electronic medical record for Abuse and Falls Screening details.    Subjective       Presenting condition or subjective complaint: Stroke, left side weakness, left hand numb/weak    Pt presents for new patient evaluation. Accompanied by daughter Uziel today. Pt presented to the ER on 12/12 with left-sided weakness, slurred speech and was found to have an acute R MCA ischemic stroke. Was having home care therapies until yesterday when he was discharged from home PT and OT. Still having some L sided weakness and imbalance, does have concerns about L hand as well. Has home exericse handouts with him. Denies numbness in feet. 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 Also wants to get back to driving, waiting for MD loving for that. Pt is left hand dominant. Overall feels like things are slowly improving since the stroke.     Date of onset: 12/12/23    Relevant medical history: High blood pressure; Sleep disorder like apnea; Stroke   Dates & types of surgery: Low back, right pinky finger, left pointer and middle finger    Prior diagnostic imaging/testing results: CT scan     Prior therapy history for the same diagnosis, illness or injury: Yes Home care PT after hospital    Prior Level of Function  Transfers: Independent  Ambulation: Independent  ADL: Independent    Living Environment  Social support: With a significant other or spouse   Type of home: House   Stairs to enter the home: Yes 6     Ramp: No   Stairs inside the home: Yes 10 Is there a railing: Yes   Help at home: None  Equipment owned: Straight Cane; Walker; Walker with wheels     Employment: Yes   Hobbies/Interests: Work outside, being with family    Patient goals for therapy: have strength back in left hand, go back to work, drive    Pain assessment: Pain denied     Objective      Cognitive Status Examination  Orientation: Oriented to person, place and time   Level of  Consciousness: Alert  Follows Commands and Answers Questions: 100% of the time  Personal Safety and Judgement: Intact  Memory: Intact    OBSERVATION: Pt pleasant and cooperative, presents with daughter.   INTEGUMENTARY: Intact  POSTURE: Standing Posture: Rounded shoulders, Forward head, Thoracic kyphosis increased  RANGE OF MOTION: LE ROM WFL  UE ROM WFL  STRENGTH:   Pain: - none + mild ++ moderate +++ severe  Strength Scale: 0-5/5 Left Right   Ankle Dorsiflexion 4+ 5   Ankle Plantarflexion 4+ 5     Pain: - none + mild ++ moderate +++ severe  Strength Scale: 0-5/5 Left Right   Hip Flexion 4+ 5   Hip Extension     Hip Abduction 4+ 5   Hip Adduction 5 5   Hip Internal Rotation     Hip External Rotation     Knee Flexion 4 5   Knee Extension 5- 5      strength:  Handheld dynamometer position 2: R 45# L 42#  Pinch  thumb/finger: R 16# L 11#    TRANSFERS: Independent    GAIT:   Level of [place]: Independent  Assistive Device(s): None  Gait Deviations: Base of support increased  Stride length decreased  Janice decreased  Stairs: reciprocal pattern up and down no handrails    BALANCE SPECIAL TESTS  Functional Gait Assessment (FGA) TOTAL SCORE: (MAXIMUM SCORE 30): 28    10 Meter Walk Test (Comfortable)    10 Meter Walk Test (Fast)    6 Minute Walk Test (6MWT)    Jessica Balance Scale (BBS)     5 Times Sit-to-Stand (5TSTS)  14 sec     Dynamic Gait Index (DGI) Total Score (out of 24): 23   Timed Up and Go (TUG) - sec 13   Single Leg Stance Right (sec) 3   Single Leg Stance Left (sec) 5   Modified CTSIB Conditions (sec) Cond 1: 30  Cond 2: 30  Cond 4: 30  Cond 5 : 30   Romberg  (sec) 30   Sharpened Romberg (sec) 30   30 Second Sit to Stand (reps/height) 11 reps (standard height, no use of arms)      Assessment & Plan   CLINICAL IMPRESSIONS  Medical Diagnosis: Stroke-like symptoms (R29.90)    Treatment Diagnosis: Weakness   Impression/Assessment: Patient is a 68 year old male with weakness and imbalance s/p R MCA  complaints.  The following significant findings have been identified: Decreased strength, Impaired balance, Impaired gait, Decreased activity tolerance, and Impaired posture. These impairments interfere with their ability to perform work tasks, recreational activities, household chores, driving , and community mobility as compared to previous level of function.     Clinical Decision Making (Complexity):  Clinical Presentation: Stable/Uncomplicated  Clinical Presentation Rationale: based on medical and personal factors listed in PT evaluation  Clinical Decision Making (Complexity): Low complexity    PLAN OF CARE  Treatment Interventions:  Modalities: E-stim  Interventions: Gait Training, Manual Therapy, Neuromuscular Re-education, Therapeutic Activity, Therapeutic Exercise, Self-Care/Home Management    Long Term Goals     PT Goal 1  Goal Identifier: 1  Goal Description: Pt will be able to independently ambulate 0.5 miles or greater over uneven surfaces to improve community ambulation.  Target Date: 01/26/24  PT Goal 2  Goal Identifier: 2  Goal Description: Pt will be able to return to full work duties without limitation due to L leg weakness.  Target Date: 02/09/24  PT Goal 3  Goal Identifier: 3  Goal Description: Pt will be independent with HEP for self-management of symptoms.  Target Date: 02/23/24      Frequency of Treatment: Every other week  Duration of Treatment: 8 weeks    Education Assessment:   Learner/Method: Patient;No Barriers to Learning;Pictures/Video;Demonstration;Reading;Listening;Family    Risks and benefits of evaluation/treatment have been explained.   Patient/Family/caregiver agrees with Plan of Care.     Evaluation Time:     PT Eval, Low Complexity Minutes (98205): 25    Signing Clinician: Yue Virk, PT

## 2024-01-02 ENCOUNTER — TELEPHONE (OUTPATIENT)
Dept: FAMILY MEDICINE | Facility: CLINIC | Age: 69
End: 2024-01-02
Payer: COMMERCIAL

## 2024-01-02 DIAGNOSIS — I63.511 ACUTE RIGHT ARTERIAL ISCHEMIC STROKE, MCA (MIDDLE CEREBRAL ARTERY) (H): Primary | ICD-10-CM

## 2024-01-02 NOTE — TELEPHONE ENCOUNTER
----- Message from Yue Virk, PT sent at 12/29/2023  1:54 PM CST -----  Regarding: OT Referral; Return to Driving  Juan Alberto Wall,     I just completed Donnie's PT evaluation following his stroke. He is doing very well! His left leg weakness is resolving, he had almost no imbalance during balance testing in the clinic today, and is independent with gait and transfers. He and his daughter noted he was waiting on approval to return to driving pending how therapy went. From PT perspective, I don't see any reason he couldn't drive based on his current state based on evaluation today. He is safe with transfers, balance, walking, leg strength and we discussed starting gradually with short distance driving first. Ultimately I let them know if there was another reason he was restricted from returning to driving at this time, they would need to discuss that with you first, but from a PT perspective he is good to go.     He is still having some  weakness and fine motor difficulty with the left hand (he is left handed), so I also let them know I would ask if you can please enter an outpatient occupational therapy referral.     Thank you!  Yue Virk, PT

## 2024-01-02 NOTE — TELEPHONE ENCOUNTER
Message given to patient with good understanding.Mae, patient is wondering if he can start driving..Funmi Campbell on 1/2/2024 at 1:33 PM

## 2024-01-03 NOTE — TELEPHONE ENCOUNTER
Patient notified and states he is already scheduled with OT tomorrow.    Mitra Campbell RN  [facility]

## 2024-01-03 NOTE — TELEPHONE ENCOUNTER
Due to his ongoing left arm weakness and poor  strength, he will need to have an evaluation with occupational therapy prior to us making a decision about driving.  Mae Jones, CNP

## 2024-01-04 ENCOUNTER — THERAPY VISIT (OUTPATIENT)
Dept: OCCUPATIONAL THERAPY | Facility: CLINIC | Age: 69
End: 2024-01-04
Attending: NURSE PRACTITIONER
Payer: COMMERCIAL

## 2024-01-04 ENCOUNTER — MEDICAL CORRESPONDENCE (OUTPATIENT)
Dept: HEALTH INFORMATION MANAGEMENT | Facility: CLINIC | Age: 69
End: 2024-01-04

## 2024-01-04 DIAGNOSIS — I63.511 ACUTE RIGHT ARTERIAL ISCHEMIC STROKE, MCA (MIDDLE CEREBRAL ARTERY) (H): Primary | ICD-10-CM

## 2024-01-04 PROCEDURE — 97535 SELF CARE MNGMENT TRAINING: CPT | Mod: GO | Performed by: OCCUPATIONAL THERAPIST

## 2024-01-04 PROCEDURE — 97165 OT EVAL LOW COMPLEX 30 MIN: CPT | Mod: GO | Performed by: OCCUPATIONAL THERAPIST

## 2024-01-04 PROCEDURE — 97110 THERAPEUTIC EXERCISES: CPT | Mod: GO | Performed by: OCCUPATIONAL THERAPIST

## 2024-01-09 ENCOUNTER — THERAPY VISIT (OUTPATIENT)
Dept: OCCUPATIONAL THERAPY | Facility: CLINIC | Age: 69
End: 2024-01-09
Attending: NURSE PRACTITIONER
Payer: COMMERCIAL

## 2024-01-09 DIAGNOSIS — I63.511 ACUTE RIGHT ARTERIAL ISCHEMIC STROKE, MCA (MIDDLE CEREBRAL ARTERY) (H): Primary | ICD-10-CM

## 2024-01-09 PROCEDURE — 97110 THERAPEUTIC EXERCISES: CPT | Mod: GO

## 2024-01-12 ENCOUNTER — PATIENT OUTREACH (OUTPATIENT)
Dept: CARE COORDINATION | Facility: CLINIC | Age: 69
End: 2024-01-12

## 2024-01-12 ENCOUNTER — THERAPY VISIT (OUTPATIENT)
Dept: PHYSICAL THERAPY | Facility: CLINIC | Age: 69
End: 2024-01-12
Attending: INTERNAL MEDICINE
Payer: COMMERCIAL

## 2024-01-12 DIAGNOSIS — I63.511 ACUTE RIGHT ARTERIAL ISCHEMIC STROKE, MCA (MIDDLE CEREBRAL ARTERY) (H): Primary | ICD-10-CM

## 2024-01-12 PROCEDURE — 97110 THERAPEUTIC EXERCISES: CPT | Mod: GP

## 2024-01-12 PROCEDURE — 97112 NEUROMUSCULAR REEDUCATION: CPT | Mod: GP

## 2024-01-12 PROCEDURE — 93272 ECG/REVIEW INTERPRET ONLY: CPT | Performed by: INTERNAL MEDICINE

## 2024-01-12 ASSESSMENT — ACTIVITIES OF DAILY LIVING (ADL): DEPENDENT_IADLS:: INDEPENDENT

## 2024-01-12 NOTE — PROGRESS NOTES
Clinic Care Coordination Contact  [facility]    Universal Utilization: 12/9/2023 - 12/12/2023 (3 days)  [facility]     Edmar Limon MD  Last attending  Treatment team Acute right arterial ischemic stroke, MCA (middle cerebral artery) (H)  Principal problem        Clinical Data: Care Coordinator Outreach    Outreach Documentation Number of Outreach Attempt   1/12/2024   1:37 PM 1       Phone keeps ringing and no answer     Plan:. Care Coordinator will try to reach patient again in 3-5 business days.    [facility]   Cuca Dupree RN, Care Coordinator   [facility]'s   E-mail [email or web address]   [phone]

## 2024-01-12 NOTE — PROGRESS NOTES
"   01/12/24 0500   Appointment Info   Signing clinician's name / credentials Yue Virk, PT, DPT   Visits Used 2   Medical Diagnosis Stroke-like symptoms (R29.90)   PT Tx Diagnosis Weakness   Progress Note/Certification   Onset of illness/injury or Date of Surgery 12/12/23   Therapy Frequency Every other week   Predicted Duration 8 weeks   Progress Note Due Date 02/23/24   Progress Note Completed Date 12/29/23   GOALS   PT Goals 2;4;3   PT Goal 1   Goal Identifier 1   Goal Description Pt will be able to independently ambulate 0.5 miles or greater over uneven surfaces to improve community ambulation.   Goal Progress MET   Target Date 01/26/24   Date Met 01/12/24   PT Goal 2   Goal Identifier 2   Goal Description Pt will be able to return to full work duties without limitation due to L leg weakness.   Goal Progress Not met - working with OT to return to driving   Target Date 02/09/24   PT Goal 3   Goal Identifier 3   Goal Description Pt will be independent with HEP for self-management of symptoms.   Goal Progress MET   Target Date 02/23/24   Date Met 01/12/24   Subjective Report   Subjective Report Pt reports he is seeing OT, going to do a test for driving next week. Feels like his balance and strength in his legs is good, no issues with walking or getting in/out of the shower. Daughter notes he walked down stairs and over ice without issue. No recent falls.   Treatment Interventions (PT)   Interventions Therapeutic Procedure/Exercise;Neuromuscular Re-education   Therapeutic Procedure/Exercise   Therapeutic Procedures: strength, endurance, ROM, flexibillity minutes (43815) 17   Ther Proc 1 - Details Recumbent bike seat 8 level 4 x 4 min. STS no use of arms x 10 reps from chair, side stepping green band at shins 4x10' B, standing hip ABD green band above knees x 10 B. FWD step ups 6\" x 10 B. Standing squats at countertop x 10 B. Standing heel raises x 15 B.   Skilled Intervention Exercises to improve BLE strength " "  Patient Response/Progress Improve BLE strength, good understanding of exercises   Neuromuscular Re-education   Neuromuscular re-ed of mvmt, balance, coord, kinesthetic sense, posture, proprioception minutes (93081) 10   Neuro Re-ed 1 - Details Tandem stance eo on foam x 30\" B, NBOS on foam x 30\" ec, SLS x 30\" B (needing to touch down several times), tandem walking, walking slow high knee marches 2x25' ea.   Skilled Intervention Static and dynamic balance to improve stability   Patient Response/Progress Difficulty with SLS; did well with other activities for balance   Education   Learner/Method Patient;No Barriers to Learning;Pictures/Video;Demonstration;Reading;Listening;Family   Plan   Home program PTRX - papers   Updates to plan of care Discharge from PT with wean to HEP   Total Session Time   Timed Code Treatment Minutes 27   Total Treatment Time (sum of timed and untimed services) 27         DISCHARGE  Reason for Discharge: Pt has met most of PT goals with exception of driving. Will continue to see OT for L hand weakness and return to driving.     Discharge Plan: Patient to continue home program.    Referring Provider:  Edmar Limon    "

## 2024-01-16 ENCOUNTER — THERAPY VISIT (OUTPATIENT)
Dept: OCCUPATIONAL THERAPY | Facility: CLINIC | Age: 69
End: 2024-01-16
Attending: NURSE PRACTITIONER
Payer: COMMERCIAL

## 2024-01-16 DIAGNOSIS — I63.511 ACUTE RIGHT ARTERIAL ISCHEMIC STROKE, MCA (MIDDLE CEREBRAL ARTERY) (H): Primary | ICD-10-CM

## 2024-01-16 PROCEDURE — 97530 THERAPEUTIC ACTIVITIES: CPT | Mod: GO

## 2024-01-16 PROCEDURE — 97110 THERAPEUTIC EXERCISES: CPT | Mod: GO

## 2024-01-19 ENCOUNTER — PATIENT OUTREACH (OUTPATIENT)
Dept: CARE COORDINATION | Facility: CLINIC | Age: 69
End: 2024-01-19
Payer: COMMERCIAL

## 2024-01-19 ASSESSMENT — ACTIVITIES OF DAILY LIVING (ADL): DEPENDENT_IADLS:: INDEPENDENT

## 2024-01-19 NOTE — PROGRESS NOTES
Clinic Care Coordination Contact  Follow Up Progress Note     Universal Utilization: 12/9/2023 - 12/12/2023 (3 days)  [facility]     Edmar Limon MD  Last attending  Treatment team Acute right arterial ischemic stroke, MCA (middle cerebral artery) (H)  Principal problem        Assessment: Patient reports he is getting stronger with outpatient therapies and is walking independently     Care Gaps:    Health Maintenance Due   Topic Date Due    DEPRESSION ACTION PLAN  Never done    RSV VACCINE (Pregnancy & 60+) (1 - 1-dose 60+ series) Never done    DTAP/TDAP/TD IMMUNIZATION (2 - Td or Tdap) 05/03/2021    COVID-19 Vaccine (6 - 2023-24 season) 09/01/2023    MICROALBUMIN  12/13/2023       Will discuss at next PCP visit 1/26/2024    Care Plans  Care Plan: Physical Activity       Problem: Patient is inactive       Goal: Increase Physical Activity       Start Date: 12/19/2023 Expected End Date: 2/19/2024    This Visit's Progress: 60% Recent Progress: 50%    Priority: High    Note:     Barriers: Deconditioned   Strengths: Motivated   Patient expressed understanding of goal: Yes  Action steps to achieve this goal:  1. I will keep scheduled [facility] home visits (discharged)  2. I will do home exercises as instructed   3. I will keep provider appointments and take my medications as directed    4. I will keep outpatient therapy appointments                               Intervention/Education provided during outreach: Continue home exercises      Outreach Frequency: 2 weeks, more frequently as needed        Plan:     Care Coordinator will follow up in 1-2 weeks     [facility]   Cuca Dupree RN, Care Coordinator   [facility]'s   E-mail [email or web address]   [phone]

## 2024-01-23 ENCOUNTER — THERAPY VISIT (OUTPATIENT)
Dept: OCCUPATIONAL THERAPY | Facility: CLINIC | Age: 69
End: 2024-01-23
Attending: NURSE PRACTITIONER
Payer: COMMERCIAL

## 2024-01-23 DIAGNOSIS — I63.511 ACUTE RIGHT ARTERIAL ISCHEMIC STROKE, MCA (MIDDLE CEREBRAL ARTERY) (H): Primary | ICD-10-CM

## 2024-01-23 PROCEDURE — 97110 THERAPEUTIC EXERCISES: CPT | Mod: GO

## 2024-01-23 PROCEDURE — 97530 THERAPEUTIC ACTIVITIES: CPT | Mod: GO

## 2024-01-23 PROCEDURE — 97535 SELF CARE MNGMENT TRAINING: CPT | Mod: GO

## 2024-01-26 ENCOUNTER — OFFICE VISIT (OUTPATIENT)
Dept: FAMILY MEDICINE | Facility: CLINIC | Age: 69
End: 2024-01-26
Payer: COMMERCIAL

## 2024-01-26 VITALS
OXYGEN SATURATION: 97 % | HEIGHT: 66 IN | HEART RATE: 47 BPM | WEIGHT: 155 LBS | BODY MASS INDEX: 24.91 KG/M2 | RESPIRATION RATE: 12 BRPM | TEMPERATURE: 98.6 F | SYSTOLIC BLOOD PRESSURE: 126 MMHG | DIASTOLIC BLOOD PRESSURE: 58 MMHG

## 2024-01-26 DIAGNOSIS — I63.511 ACUTE RIGHT ARTERIAL ISCHEMIC STROKE, MCA (MIDDLE CEREBRAL ARTERY) (H): Primary | ICD-10-CM

## 2024-01-26 DIAGNOSIS — F10.129 ALCOHOL ABUSE WITH INTOXICATION (H): ICD-10-CM

## 2024-01-26 DIAGNOSIS — I69.959 HEMIPLEGIA AND HEMIPARESIS FOLLOWING UNSPECIFIED CEREBROVASCULAR DISEASE AFFECTING UNSPECIFIED SIDE (H): ICD-10-CM

## 2024-01-26 DIAGNOSIS — F33.0 MDD (MAJOR DEPRESSIVE DISORDER), RECURRENT EPISODE, MILD (H): ICD-10-CM

## 2024-01-26 DIAGNOSIS — F51.01 PRIMARY INSOMNIA: ICD-10-CM

## 2024-01-26 DIAGNOSIS — N18.31 STAGE 3A CHRONIC KIDNEY DISEASE (H): ICD-10-CM

## 2024-01-26 PROCEDURE — 99214 OFFICE O/P EST MOD 30 MIN: CPT | Performed by: NURSE PRACTITIONER

## 2024-01-26 RX ORDER — ZOLPIDEM TARTRATE 12.5 MG/1
TABLET, FILM COATED, EXTENDED RELEASE ORAL
Qty: 30 TABLET | Refills: 5 | Status: SHIPPED | OUTPATIENT
Start: 2024-01-26 | End: 2024-07-17 | Stop reason: DRUGHIGH

## 2024-01-26 RX ORDER — ROSUVASTATIN CALCIUM 10 MG/1
10 TABLET, COATED ORAL DAILY
Qty: 90 TABLET | Refills: 3 | Status: SHIPPED | OUTPATIENT
Start: 2024-01-26

## 2024-01-26 ASSESSMENT — PAIN SCALES - GENERAL: PAINLEVEL: NO PAIN (0)

## 2024-01-26 NOTE — PROGRESS NOTES
Assessment & Plan     Acute right arterial ischemic stroke, MCA (middle cerebral artery) (H)  Continue aspirin alone.  Start statin:  - rosuvastatin (CRESTOR) 10 MG tablet; Take 1 tablet (10 mg) by mouth daily    Hemiplegia and hemiparesis following unspecified cerebrovascular disease affecting unspecified side (H)  Much improved.  Complete therapies as prescribed.  Patient may start driving with supervision this weekend.  May consider going back to work; his daughter will call his  to see if he could go back part-time or with light duty    Primary insomnia  Well-controlled  - zolpidem ER (AMBIEN CR) 12.5 MG CR tablet; Take 1 tablet (12.5 mg) by mouth nightly as needed for sleep    Alcohol abuse with intoxication (H24)  Naltrexone working well.  Recommend continuing    MDD (major depressive disorder), recurrent episode, mild (H24)  No symptoms currently.  Not on any medications    Stage 3a chronic kidney disease (H)  Known issue that I take into account for their medical decisions, no current exacerbations or new concerns    The risks, benefits and treatment options of prescribed medications or other treatments have been discussed with the patient. The patient verbalized their understanding and should call or follow up if no improvement or if they develop further problems.  Mae Jones, CNP              Subjective   Donnie is a 68 year old, presenting for the following health issues:  Neurologic Problem (Stroke follow up; discuss going back to work), Letter for School/Work (Possibly for work restrictions), and Health Maintenance (Patient declines immunizations)        1/26/2024     9:58 AM   Additional Questions   Roomed by Preeti AMBROSIO   Accompanied by daughterariel         1/26/2024     9:58 AM   Patient Reported Additional Medications   Patient reports taking the following new medications none     History of Present Illness       Cerebrovascular Disease: He presents for follow up of  "CVA/TIA.       Patient history::  Ischemic CVA    Residual symptoms::  Slurred speech (nothing that he really can tell now .  isn't doing much to know if he has any symptoms.  left arm and hand had a little weakness but strength has gotten better.  slurred speech when tired.)    Reason for visit:  Stroke follow up discuss driving and going back to work    He eats 2-3 servings of fruits and vegetables daily.He consumes 1 sweetened beverage(s) daily.He exercises with enough effort to increase his heart rate 20 to 29 minutes per day.  He exercises with enough effort to increase his heart rate 7 days per week.   He is taking medications regularly.     Patient reports that he is feeling much improved.  Continues to participate in therapies.  Left upper extremity weakness is resolving; he has noted increased improvement in his strength in his arm as well as his  strength.  Patient and his family both feel that he is ready to start driving again.  Patient would also like to discuss returning to work, feels like he could do light duty.    He stopped the clopidogrel as recommended from neurology and discharge, and continues on aspirin alone.  He was not yet started on a statin.    He started naltrexone for alcohol dependency.  He reports the medication is working well.  He denies any alcohol cravings.  He has not had any alcohol since discharge.      Medication Followup of Ambien for insomnia  Taking Medication as prescribed: yes  Side Effects:  None  Medication Helping Symptoms:  yes        Review of Systems  Constitutional, HEENT, cardiovascular, pulmonary, gi and gu systems are negative, except as otherwise noted.      Objective    /58 (BP Location: Right arm, Patient Position: Sitting, Cuff Size: Adult Regular)   Pulse (!) 47   Temp 98.6  F (37  C) (Tympanic)   Resp 12   Ht 1.676 m (5' 6\")   Wt 70.3 kg (155 lb)   SpO2 97%   BMI 25.02 kg/m    Body mass index is 25.02 kg/m .  Physical Exam   GENERAL: alert " and no distress  NECK: no adenopathy, no asymmetry, masses, or scars  RESP: lungs clear to auscultation - no rales, rhonchi or wheezes  CV: regular rate and rhythm, normal S1 S2, no S3 or S4, no murmur, click or rub, no peripheral edema  ABDOMEN: soft, nontender, no hepatosplenomegaly, no masses and bowel sounds normal  MS: Range of motion in bilateral upper and lower extremities was normal.  Strength in all extremities normal 5/5.   strength in both hands was equal bilaterally 5/5.  Range of motion in neck was normal.            Signed Electronically by: MAYANK Lutz CNP

## 2024-01-30 ENCOUNTER — THERAPY VISIT (OUTPATIENT)
Dept: OCCUPATIONAL THERAPY | Facility: CLINIC | Age: 69
End: 2024-01-30
Attending: NURSE PRACTITIONER
Payer: COMMERCIAL

## 2024-01-30 ENCOUNTER — TELEPHONE (OUTPATIENT)
Dept: FAMILY MEDICINE | Facility: CLINIC | Age: 69
End: 2024-01-30
Payer: COMMERCIAL

## 2024-01-30 DIAGNOSIS — I63.511 ACUTE RIGHT ARTERIAL ISCHEMIC STROKE, MCA (MIDDLE CEREBRAL ARTERY) (H): Primary | ICD-10-CM

## 2024-01-30 PROCEDURE — 97535 SELF CARE MNGMENT TRAINING: CPT | Mod: GO

## 2024-01-30 PROCEDURE — 97110 THERAPEUTIC EXERCISES: CPT | Mod: GO

## 2024-01-30 NOTE — TELEPHONE ENCOUNTER
Chief Complaint   Patient presents with    Letter for School/Work     Pt would like a letter to return to work on the 02/05/24, wants to return daily. Can take breaks or go home as needed when tired. Fax copy to [employer] in [employer] @ [phone]. Would like copy of letter given to daughter Uziel.

## 2024-01-30 NOTE — LETTER
January 30, 2024      RE: Donnie Ernandez  [address] [place] [place] [address]        To Whom It May Concern:      Donnie Ernandez was seen in our clinic. He may return to work without restrictions on 2/5/2024.        Sincerely,          MAYANK Lutz CNP

## 2024-02-06 ENCOUNTER — THERAPY VISIT (OUTPATIENT)
Dept: OCCUPATIONAL THERAPY | Facility: CLINIC | Age: 69
End: 2024-02-06
Attending: NURSE PRACTITIONER
Payer: COMMERCIAL

## 2024-02-06 DIAGNOSIS — I63.511 ACUTE RIGHT ARTERIAL ISCHEMIC STROKE, MCA (MIDDLE CEREBRAL ARTERY) (H): Primary | ICD-10-CM

## 2024-02-06 PROCEDURE — 97110 THERAPEUTIC EXERCISES: CPT | Mod: GO

## 2024-02-06 PROCEDURE — 97535 SELF CARE MNGMENT TRAINING: CPT | Mod: GO

## 2024-02-12 ENCOUNTER — THERAPY VISIT (OUTPATIENT)
Dept: OCCUPATIONAL THERAPY | Facility: CLINIC | Age: 69
End: 2024-02-12
Attending: NURSE PRACTITIONER
Payer: COMMERCIAL

## 2024-02-12 DIAGNOSIS — I63.511 ACUTE RIGHT ARTERIAL ISCHEMIC STROKE, MCA (MIDDLE CEREBRAL ARTERY) (H): Primary | ICD-10-CM

## 2024-02-12 PROCEDURE — 97110 THERAPEUTIC EXERCISES: CPT | Mod: GO

## 2024-02-13 ENCOUNTER — PATIENT OUTREACH (OUTPATIENT)
Dept: CARE COORDINATION | Facility: CLINIC | Age: 69
End: 2024-02-13
Payer: COMMERCIAL

## 2024-02-13 ASSESSMENT — ACTIVITIES OF DAILY LIVING (ADL): DEPENDENT_IADLS:: INDEPENDENT

## 2024-02-13 NOTE — LETTER
[facility]ORD[facility]  [address] [address]     February 13, 2024    Donnie Ernandez  [address] [place] [place] [address]    Dear Donnie,  Your Care Team congratulates you on your journey to maintain wellness. This document will help guide you on your journey to maintain a healthy lifestyle.  You can use this to help you overcome any barriers you may encounter.  If you should have any questions or concerns, you can contact the members of your Care Team or contact your Primary Care Clinic for assistance.     Health Maintenance  Health Maintenance Reviewed: Not assessed    My Access Plan  Medical Emergency 911   Primary Clinic Line [facility] - [phone]   24 Hour Appointment Line [phone] or  [phone] ([phone]) (toll-free)   24 Hour Nurse Line [phone] (toll-free)   Preferred Urgent Care [facility], [phone]   Preferred Hospital [facility]  [phone]   Preferred Pharmacy [facility] - [place], [place] - [address] [place] [address]     Behavioral Health Crisis Line The National Suicide Prevention Lifeline at 1-246.962.2813 or 913     My Care Team Members  Patient Care Team         Relationship Specialty Notifications Start End    Mae Jones APRN CNP PCP - General Family Practice  3/18/13     Phone: [phone] Fax: [phone] [address] [address]    Mae Jones APRN CNP Assigned PCP   3/25/23     Phone: [phone] Fax: [phone] [address] [address]    Cuca Dupree, RN Lead Care Coordinator Primary Care - CC Admissions 12/14/23 2/13/24    Phone: [phone]         James Nieves MD MD Neurology  12/18/23     Phone: [phone] Fax: [phone]         [address] [facility] [address]                  Advance Care Plans/Directives Type:      We notice that you do not have an Advance Directive on file.  Upon completion of your Health Care Directive, please bring a copy with you to your next office visit.    It has been your Clinic Care Team's pleasure to work with you on your goals.    Regards,  [facility]   Cuca Dupree RN, Care Coordinator   [facility]'s   E-mail [email or web address]   [phone]   Your Clinic Care Team

## 2024-02-13 NOTE — PROGRESS NOTES
Clinic Care Coordination Contact  Follow Up Progress Note     12/9/2023 - 12/12/2023 (3 days)  [facility]     Edmar Limon MD  Last attending  Treatment team Acute right arterial ischemic stroke, MCA (middle cerebral artery) (H)  Principal problem         Assessment: CC RN spoke to wife and she reports the patient has been back to work for a week.  Continues outpatient therapies and is making progress with his strength   No further needs at this time     Care Gaps:    Health Maintenance Due   Topic Date Due    DEPRESSION ACTION PLAN  Never done    RSV VACCINE (Pregnancy & 60+) (1 - 1-dose 60+ series) Never done    DTAP/TDAP/TD IMMUNIZATION (2 - Td or Tdap) 05/03/2021    COVID-19 Vaccine (6 - 2023-24 season) 09/01/2023    MICROALBUMIN  12/13/2023       Will discuss at next PCP visit         Intervention/Education provided during outreach:Informed wife CC RN will make no further outreached and to call in  the future as needs arise               Plan:   No unmet needs, no further care coordination is needed at this time   Graduation letter sent via My Chart     [facility]   Cuca Dupree RN, Care Coordinator   [facility]'s   E-mail [email or web address]   [phone]

## 2024-02-15 ENCOUNTER — PATIENT OUTREACH (OUTPATIENT)
Dept: CARE COORDINATION | Facility: CLINIC | Age: 69
End: 2024-02-15
Payer: COMMERCIAL

## 2024-02-21 ENCOUNTER — OFFICE VISIT (OUTPATIENT)
Dept: FAMILY MEDICINE | Facility: CLINIC | Age: 69
End: 2024-02-21
Payer: COMMERCIAL

## 2024-02-21 VITALS
TEMPERATURE: 98.4 F | DIASTOLIC BLOOD PRESSURE: 56 MMHG | RESPIRATION RATE: 16 BRPM | BODY MASS INDEX: 24.91 KG/M2 | SYSTOLIC BLOOD PRESSURE: 122 MMHG | WEIGHT: 155 LBS | OXYGEN SATURATION: 98 % | HEART RATE: 56 BPM | HEIGHT: 66 IN

## 2024-02-21 DIAGNOSIS — J06.9 VIRAL URI: Primary | ICD-10-CM

## 2024-02-21 PROCEDURE — 99213 OFFICE O/P EST LOW 20 MIN: CPT | Performed by: NURSE PRACTITIONER

## 2024-02-21 ASSESSMENT — PAIN SCALES - GENERAL: PAINLEVEL: NO PAIN (0)

## 2024-02-21 NOTE — LETTER
February 21, 2024      Donnie Ernandez  [address] [place] [address]        To Whom It May Concern:    Donnie Ernandez  was seen on 2/21/2024.  Please excuse him from work 2/19/2024 -2/21/2024 due to illness. He may return to work on 2/22/2024        Sincerely,          MAYANK Lutz CNP

## 2024-02-21 NOTE — PROGRESS NOTES
Assessment & Plan     Viral URI  1. Drink plenty of fluids.  2. May use tylenol 1000 mg every 8 hours or ibuprofen 600 mg every 6 hours for any discomfort you are having.  3. Use Neti pot or nasal saline if you are having nasal or sinus congestion  4. For cough, dextromethorphan/guaifenesin combinations help loosen secretions and suppress cough safely without significant risk of sedation.   5. For nasal congestion and sinus pressure, pseudoephedrine (Sudafed) or phenylephrine is often helpful but it can cause elevations in blood pressure and insomnia.  Use Coricidin as a decongestant if you have a history of hypertension.  6. For runny nose and nasal congestion, use over-the-counter oxymetazoline (i.e. Afrin - use 2 sprays of 0.05% in each nostril every 12 hours; stop after 3-5 days)   7. Suggest humidifier in room at night  8. Call clinic if no improvement in 1 week      Letter written for work.    The risks, benefits and treatment options of prescribed medications or other treatments have been discussed with the patient. The patient verbalized their understanding and should call or follow up if no improvement or if they develop further problems.  Mae Jones, CNP              Subjective   Donnie is a 68 year old, presenting for the following health issues:  URI and Health Maintenance (Declined immunizations today)        2/21/2024    10:40 AM   Additional Questions   Roomed by ena AMBROSIO   Accompanied by self     HPI       Acute Illness  Acute illness concerns: head cold- runny nose  Onset/Duration: Friday- 5 days  Symptoms:  Fever: No  Chills/Sweats: YES  Headache (location?): YES- right side towards the back  Sinus Pressure: No  Conjunctivitis:  No  Ear Pain: YES- left ear is bothering him  Rhinorrhea: YES  Congestion: YES  Sore Throat: No  Cough: no  Wheeze: No  Decreased Appetite: No  Nausea: No  Vomiting: No  Diarrhea: No  Dysuria/Freq.: No  Dysuria or Hematuria: No  Fatigue/Achiness: No body aches/  "fatigued  Sick/Strep Exposure: No  Therapies tried and outcome: allergy pill-may have helped a little.  Has been off of work this week          Review of Systems  Constitutional, neuro, ENT, endocrine, pulmonary, cardiac, gastrointestinal, genitourinary, musculoskeletal, integument and psychiatric systems are negative, except as otherwise noted.      Objective    /56 (BP Location: Right arm, Patient Position: Sitting, Cuff Size: Adult Regular)   Pulse 56   Temp 98.4  F (36.9  C) (Tympanic)   Resp 16   Ht 1.676 m (5' 6\")   Wt 70.3 kg (155 lb)   SpO2 98%   BMI 25.02 kg/m    Body mass index is 25.02 kg/m .  Physical Exam   GENERAL: alert and no distress  HENT: ear canals and TM's normal, nose and mouth without ulcers or lesions  NECK: no adenopathy, no asymmetry, masses, or scars  RESP: lungs clear to auscultation - no rales, rhonchi or wheezes  CV: regular rate and rhythm, normal S1 S2, no S3 or S4, no murmur, click or rub, no peripheral edema            Signed Electronically by: Mae Jones, APRN CNP    "

## 2024-02-21 NOTE — PROGRESS NOTES
"Subjective    HPI:    Donnie is a 68 year old male that presents with a head cold and runny nose.    Symptoms started 5 days ago and are gradually improving. Patient endorses a runny nose, headaches, sinus congestion, and left ear pain. Denies sinus pressure, fevers, chills, shortness of breath, sore throat, cough, chest pain, or GI upset. He has tried taking an allergy pill which helped a little with the runny nose and Tylenol which resolved his headache. Patient denies being around sick contacts. Patient has missed work for the last 3 days and would also like a work note.     ROS:  GEN: Denies fevers, chills, and fatigue.   HEENT: Positive for rhinorrhea, sinus congestion, and left ear pain. Denies sinus pressure and sore throat.   RESP: Denies shortness of breath or cough.  CV: Denies chest pain.   GI: Denies nausea, vomiting, diarrhea, upset stomach.     Objective  /56 (BP Location: Right arm, Patient Position: Sitting, Cuff Size: Adult Regular)   Pulse 56   Temp 98.4  F (36.9  C) (Tympanic)   Resp 16   Ht 1.676 m (5' 6\")   Wt 70.3 kg (155 lb)   SpO2 98%   BMI 25.02 kg/m      Physical Exam  GEN: Alert and in no acute distress.   HEENT: Normocephalic. PERRL. Bilateral nonpurulent effusions present. Ear canal patent without erythema or bulging. Nasal mucosa erythematous and moist. Edematous nasal turbinates. Oropharynx pink and moist without exudate. No lymphadenopathy noted.   RESP: Lungs clear to auscultation without wheezes, rhonchi, or rales.   CV: Regular rate and rhythm. S1 and S2 present without murmur, gallop, or rub. No peripheral edema noted.     Assessment and Plan    (J06.9) Viral URI  (primary encounter diagnosis)  Comment: URI symptoms for the past 5 days and improving.  Plan:  - Encouraged OTC Flonase and decongestants   - Encouraged fluids and rest   - Follow up if symptoms worsen or fail to improve  - Work note provided       Note by Margarita Keller, RN, DNP Student       "

## 2024-02-29 ENCOUNTER — PATIENT OUTREACH (OUTPATIENT)
Dept: CARE COORDINATION | Facility: CLINIC | Age: 69
End: 2024-02-29
Payer: COMMERCIAL

## 2024-03-11 NOTE — PROGRESS NOTES
In person evaluation      HPI  12/10/2023, hospital consult Dr. 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)  Left hand fine motor skill deficit from stroke      Patient has decreased hearing in the left ear  Yesterday had a little bit of lightheadedness  Did try to increase fluids a bit and relaxed and it went away  Since his stroke he has had fairly persistent nasal drip from his right nostril    Has chronic difficulty though with the dexterity of his left hand does fluctuate with fatigue  Some dysarthria which fluctuates also with fatigue  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  Patient is left-handed    Crestor 10 mg once per day  Aspirin 81 mg once per day  Risk factor reduction per primary MD    Set up eval by sleep specialist use possibly an old sleep mask distant past but did not like it possibly one of the newer masks would help  Counseled to stop drinking completely he is doing 6 beers per week but I am fearful that this could get out of hand and cause more trouble  Reviewed OT exercises that he should continue  Follow-up in October 2024  Reviewed the actual MRI scans with patient and daughter  Can talk with primary about seeing ENT (not sure that left hearing loss is from the stroke in the right insular area where he may have some peripheral cause that could be managed by ENT)      A.  Right MCA infarct 12/9/2023        Right M2/M3 occlusion        Status post thrombolytics at the time of stroke        30-day outpatient Holter monitor recommended, 26+ day negative for atrial fibrillation.      B.  Vascular risk factors      Hypertension/alcohol use disorder/NILDA    C.  Return to work note written by primary       Return to work 2/5/2024, return daily, can take breaks or go home as needed if tired.      D.  Past history  hyperreflexia       Worked up by Dr. Townsend orthopedic, some mild canal narrowing (C5-C6/T8-T9/L4-L5) not sufficient to explain symptoms per orthopedic       History of laminectomy for low back pain in the past status post steroid injections       Evaluated by Dr. Manuel Garcias 1/2/2019        Hospital course 12/9/2023  68-year-old presented to hospital 12/9/2023 with slurred speech/left-sided weakness found on floor.  Patient was dysarthric in the ER  Is within the window for thrombolytics which were used.  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  No acute territorial infarction.  HEAD CTA: 12/9/2023  1.  No large vessel occlusion is identified.        However, there is somewhat decreased vessel arborization in the region of the right frontal operculum, which may reflect occlusion of a distal M2/M3 branch.        MRI is recommended for further evaluation.  2.  Enhancement is present throughout the dural venous sinuses.        However, there is somewhat reduced enhancement in the inferior aspect of the superior sagittal sinus compared to adjacent sinuses.        This is favored to be technique related as opposed to reflecting a nonocclusive thrombus.       CTV or MRV can be performed for further evaluation if clinically warranted.  NECK CTA: 12/9/2023  1.  No hemodynamically significant stenosis in the neck vessels.   2.  No evidence for dissection.  CT  cervical spine 12/9/2023  1.  No fracture or posttraumatic subluxation.  2.  Degenerative changes, see official report.  MRV 12/9/2023  1.  No dural venous sinus thrombosis or significant stenosis.   MRI head 12/10/2023  1.  Acute right MCA territory infarct identified involving the right insula, right external capsule, right frontal lobe and right parietal lobe.        No hemorrhagic conversion or significant mass effect.  2.  Chronic senescent and presumed microvascular ischemic changes.  Echo 12/10/2023, ejection fraction 75%, mild left atrial enlargement  Holter monitor 12/12/2023 - 1/10/2024 (26+ days)  No atrial fibrillation no tachyarrhythmias    Laboratory data                         12/2023  NA/K                137/4.3  BUN/Cr            12.3/1.14  GLU                 98  AST                 53  WBC/HGB       10.7/10.1  PLTs                 455,000  HDL/LDL          42/66  B12                   292  HGBA1C          5.1  EtOH                0.15      Exam    Review of systems  Slurred speech worse when he is tired  No dysphagia though can eat okay  No visual changes  Some hearing loss in the left ear  Postnasal drip with drainage from his right nose nostril  No headache no chest pain no shortness of breath no nausea vomiting no diarrhea no fever chills    Continued dysmetria and clumsiness of the left hand is left-handed      General exam  Blood pressure 129/68, pulse 65  Alert orient x 3  HEENT normal  Lungs clear  Abdomen soft  Symmetrical pulses  No edema the feet    Neurologic exam  Alert orient x 3  Normal prosody of speech  Normal naming  Normal comprehension  Normal repetition  No aphasia  No neglect  Memory recall okay    Cranials 2 through 12  No ophthalmoplegia  No nystagmus  Visual fields intact confrontation  Face symmetrical (plus minus a little decreased nasolabial fold on the left intermittently)  Tongue twisters slightly thick but understandable    Upper extremities  Right arm normal  Left  arm distal greater than proximal weakness clumsy decreased rapid alternating movements  Left arm drift weakness is worse distally than proximal      Lower extremities  No significant weakness      DTRs  Brisk bilaterally (chronic)  Left toe equivocal upgoing    Gait  Normal (slightly hesitant)          Assessment/plan      Right MCA infarct 12/9/2023        Involved right insula/external capsule/frontal lobe/parietal lobe        Status post thrombolytics 12/9/2023    2.   Right M1 2/M3 occlusion per CTA at time of infarct        Holter monitor negative for A-fib    3.   Vascular risk factors        Hypertension/alcohol use disorder/NILDA currently untreated      Diagnosis  Right CVA  Obstructive sleep apnea untreated  Alcohol use disorder    Recommend  Risk factor reduction per primary MD  Aspirin 81 mg once per day  Crestor 10 mg once per day    Set up eval by sleep specialist use possibly an old sleep mask distant past but did not like it possibly one of the newer masks would help  Counseled to stop drinking completely he is doing 6 beers per week but I am fearful that this could get out of hand and cause more trouble  Reviewed OT exercises that he should continue  Follow-up in October 2024  Reviewed the actual MRI scans with patient and daughter  Can talk with primary about seeing ENT (not sure that left hearing loss is from the stroke in the right insular area where he may have some peripheral cause that could be managed by ENT)    Recommended that he stop drinking alcohol completely  Should work with primary to be alcohol free    Follow-up in October 2024  Conference with patient and daughter and actually reviewed the MRI scan and workup from the hospital.    Total care time today 60 minutes      As part of visit today  Reviewed hospitalization 12/2023  Reviewed scans as above  Reviewed laboratory data  Reviewed primary MD notes   Statement Selected

## 2024-03-12 ENCOUNTER — OFFICE VISIT (OUTPATIENT)
Dept: NEUROLOGY | Facility: CLINIC | Age: 69
End: 2024-03-12
Payer: COMMERCIAL

## 2024-03-12 VITALS
HEIGHT: 66 IN | WEIGHT: 159 LBS | DIASTOLIC BLOOD PRESSURE: 68 MMHG | SYSTOLIC BLOOD PRESSURE: 129 MMHG | HEART RATE: 65 BPM | BODY MASS INDEX: 25.55 KG/M2

## 2024-03-12 DIAGNOSIS — G47.33 OSA (OBSTRUCTIVE SLEEP APNEA): ICD-10-CM

## 2024-03-12 DIAGNOSIS — I63.311 CEREBROVASCULAR ACCIDENT (CVA) DUE TO THROMBOSIS OF RIGHT MIDDLE CEREBRAL ARTERY (H): Primary | ICD-10-CM

## 2024-03-12 PROCEDURE — 99215 OFFICE O/P EST HI 40 MIN: CPT | Performed by: PSYCHIATRY & NEUROLOGY

## 2024-03-12 NOTE — NURSING NOTE
Chief Complaint   Patient presents with    Hospital F/U     Follow up for stroke on 12/9/23. Pt states he has noticed decreased hearing in left ear. Yesterday was feeling lightheaded. He drank more fluids and relaxed and it seemed to go away. More difficulty with fine more skills on left side. He has also noticed that since the stroke his right nostril is constantly dripping.       Sushila Saravia LPN on 3/12/2024 at 9:06 AM

## 2024-03-12 NOTE — LETTER
3/12/2024         RE: Donnie Ernandez  [address] [place] [address]        Dear Colleague,    Thank you for referring your patient, Donnie Ernandez, to the [facility] [place]. Please see a copy of my visit note below.    In person evaluation      HPI  12/10/2023, hospital consult Dr. 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)  Left hand fine motor skill deficit from stroke      Patient has decreased hearing in the left ear  Yesterday had a little bit of lightheadedness  Did try to increase fluids a bit and relaxed and it went away  Since his stroke he has had fairly persistent nasal drip from his right nostril    Has chronic difficulty though with the dexterity of his left hand does fluctuate with fatigue  Some dysarthria which fluctuates also with fatigue  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  Patient is left-handed    Crestor 10 mg once per day  Aspirin 81 mg once per day  Risk factor reduction per primary MD    Set up eval by sleep specialist use possibly an old sleep mask distant past but did not like it possibly one of the newer masks would help  Counseled to stop drinking completely he is doing 6 beers per week but I am fearful that this could get out of hand and cause more trouble  Reviewed OT exercises that he should continue  Follow-up in October 2024  Reviewed the actual MRI scans with patient and daughter  Can talk with primary about seeing ENT (not sure that left hearing loss is from the stroke in the right insular area where he may have some peripheral cause that could be managed by ENT)      A.  Right MCA infarct 12/9/2023        Right M2/M3 occlusion        Status post thrombolytics at the time of stroke        30-day outpatient Holter monitor recommended, 26+ day negative  for atrial fibrillation.      B.  Vascular risk factors      Hypertension/alcohol use disorder/NILDA    C.  Return to work note written by primary       Return to work 2/5/2024, return daily, can take breaks or go home as needed if tired.      D.  Past history hyperreflexia       Worked up by Dr. Townsend orthopedic, some mild canal narrowing (C5-C6/T8-T9/L4-L5) not sufficient to explain symptoms per orthopedic       History of laminectomy for low back pain in the past status post steroid injections       Evaluated by Dr. Manuel Garcias 1/2/2019        Hospital course 12/9/2023  68-year-old presented to hospital 12/9/2023 with slurred speech/left-sided weakness found on floor.  Patient was dysarthric in the ER  Is within the window for thrombolytics which were used.  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  No acute territorial infarction.  HEAD CTA: 12/9/2023  1.  No large vessel occlusion is identified.        However, there is somewhat decreased vessel arborization in the region of the right frontal operculum, which may reflect occlusion of a distal M2/M3 branch.        MRI is recommended for further evaluation.  2.  Enhancement is present throughout the dural venous sinuses.        However, there is somewhat reduced enhancement in the inferior aspect of the superior sagittal sinus compared to adjacent sinuses.        This is favored to be technique related as  opposed to reflecting a nonocclusive thrombus.       CTV or MRV can be performed for further evaluation if clinically warranted.  NECK CTA: 12/9/2023  1.  No hemodynamically significant stenosis in the neck vessels.   2.  No evidence for dissection.  CT cervical spine 12/9/2023  1.  No fracture or posttraumatic subluxation.  2.  Degenerative changes, see official report.  MRV 12/9/2023  1.  No dural venous sinus thrombosis or significant stenosis.   MRI head 12/10/2023  1.  Acute right MCA territory infarct identified involving the right insula, right external capsule, right frontal lobe and right parietal lobe.        No hemorrhagic conversion or significant mass effect.  2.  Chronic senescent and presumed microvascular ischemic changes.  Echo 12/10/2023, ejection fraction 75%, mild left atrial enlargement  Holter monitor 12/12/2023 - 1/10/2024 (26+ days)  No atrial fibrillation no tachyarrhythmias    Laboratory data                         12/2023  NA/K                137/4.3  BUN/Cr            12.3/1.14  GLU                 98  AST                 53  WBC/HGB       10.7/10.1  PLTs                 455,000  HDL/LDL          42/66  B12                   292  HGBA1C          5.1  EtOH                0.15      Exam    Review of systems  Slurred speech worse when he is tired  No dysphagia though can eat okay  No visual changes  Some hearing loss in the left ear  Postnasal drip with drainage from his right nose nostril  No headache no chest pain no shortness of breath no nausea vomiting no diarrhea no fever chills    Continued dysmetria and clumsiness of the left hand is left-handed      General exam  Blood pressure 129/68, pulse 65  Alert orient x 3  HEENT normal  Lungs clear  Abdomen soft  Symmetrical pulses  No edema the feet    Neurologic exam  Alert orient x 3  Normal prosody of speech  Normal naming  Normal comprehension  Normal repetition  No aphasia  No neglect  Memory recall okay    Cranials 2 through 12  No  ophthalmoplegia  No nystagmus  Visual fields intact confrontation  Face symmetrical (plus minus a little decreased nasolabial fold on the left intermittently)  Tongue twisters slightly thick but understandable    Upper extremities  Right arm normal  Left arm distal greater than proximal weakness clumsy decreased rapid alternating movements  Left arm drift weakness is worse distally than proximal      Lower extremities  No significant weakness      DTRs  Brisk bilaterally (chronic)  Left toe equivocal upgoing    Gait  Normal (slightly hesitant)          Assessment/plan      Right MCA infarct 12/9/2023        Involved right insula/external capsule/frontal lobe/parietal lobe        Status post thrombolytics 12/9/2023    2.   Right M1 2/M3 occlusion per CTA at time of infarct        Holter monitor negative for A-fib    3.   Vascular risk factors        Hypertension/alcohol use disorder/NILDA currently untreated      Diagnosis  Right CVA  Obstructive sleep apnea untreated  Alcohol use disorder    Recommend  Risk factor reduction per primary MD  Aspirin 81 mg once per day  Crestor 10 mg once per day    Set up eval by sleep specialist use possibly an old sleep mask distant past but did not like it possibly one of the newer masks would help  Counseled to stop drinking completely he is doing 6 beers per week but I am fearful that this could get out of hand and cause more trouble  Reviewed OT exercises that he should continue  Follow-up in October 2024  Reviewed the actual MRI scans with patient and daughter  Can talk with primary about seeing ENT (not sure that left hearing loss is from the stroke in the right insular area where he may have some peripheral cause that could be managed by ENT)    Recommended that he stop drinking alcohol completely  Should work with primary to be alcohol free    Follow-up in October 2024  Conference with patient and daughter and actually reviewed the MRI scan and workup from the  hospital.    Total care time today 60 minutes      As part of visit today  Reviewed hospitalization 12/2023  Reviewed scans as above  Reviewed laboratory data  Reviewed primary MD notes      Again, thank you for allowing me to participate in the care of your patient.        Sincerely,        kaykay Nieves MD

## 2024-03-19 ENCOUNTER — OFFICE VISIT (OUTPATIENT)
Dept: FAMILY MEDICINE | Facility: CLINIC | Age: 69
End: 2024-03-19
Payer: COMMERCIAL

## 2024-03-19 VITALS
BODY MASS INDEX: 24.75 KG/M2 | HEIGHT: 66 IN | SYSTOLIC BLOOD PRESSURE: 118 MMHG | WEIGHT: 154 LBS | OXYGEN SATURATION: 98 % | DIASTOLIC BLOOD PRESSURE: 68 MMHG | HEART RATE: 54 BPM | TEMPERATURE: 99.1 F

## 2024-03-19 DIAGNOSIS — R19.7 DIARRHEA, UNSPECIFIED TYPE: Primary | ICD-10-CM

## 2024-03-19 PROCEDURE — 99213 OFFICE O/P EST LOW 20 MIN: CPT | Performed by: NURSE PRACTITIONER

## 2024-03-19 NOTE — LETTER
March 19, 2024      Donnie Ernandez  [address]  [place] [address]        To Whom It May Concern:    Donnie Ernandez  was seen on 3/19/24.  Please excuse him for 3/18/24 and 3/19/24 due to illness.  He will return when he is feeling better.        Sincerely,        Jen Aparicio, NP

## 2024-03-19 NOTE — PROGRESS NOTES
Assessment & Plan     Diarrhea, unspecified type  Due to acute symptoms likely viral gastroenteritis or Salmonella cause which is self-limiting.  Advised patient to push fluids and take Imodium 1/2 to 1 tablet with each stool up to a max of 8 tabs daily.  If symptoms are continuing more than 1 week, would move forward with stool testing and labs.  I have advised patient to call if this is occurring.  Patient should follow-up in ER or ADS if any symptoms of dehydration for fluids.    See Patient Instructions    Russ Fields is a 68 year old, presenting for the following health issues:  Abdominal Pain        3/19/2024    11:17 AM   Additional Questions   Accompanied by daughter     HPI       Pain History:  When did you first notice your pain? Started Sunday 3 days  Have you seen anyone else for your pain? No  How has your pain affected your ability to work? 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 Abdominal discomfort and diarrhea   Description:   Character: stomach ache with back diarrhea   Progression of Symptoms:  same  Accompanying Signs & Symptoms:  Fever/Chills: No  Gas/Bloating: No  Nausea: No  Vomitting: No  Diarrhea: YES - no blood   Constipation: No  Dysuria or Hematuria: No  Therapies tried and outcome: nothing yet     Patient states that symptoms started on Sunday.  He does feel that they are improving a little more today.  He states that he has no more than 3-4 loose stools daily.  He has been pushing fluids.  He does not able to correlate food as a cause prior to symptoms since he has only gone out once to eat and his wife is not sick.  He denies any constipation.  He is afebrile today.      Review of Systems  CONSTITUTIONAL: NEGATIVE for fever, chills, change in weight  GI: POSITIVE for abdominal pain generalized and diarrhea  PSYCHIATRIC: NEGATIVE for changes in mood or affect  ROS otherwise negative     "  Objective    /68 (BP Location: Left arm, Patient Position: Chair, Cuff Size: Adult Regular)   Pulse 54   Temp 99.1  F (37.3  C) (Tympanic)   Ht 1.676 m (5' 6\")   Wt 69.9 kg (154 lb)   SpO2 98%   BMI 24.86 kg/m    Body mass index is 24.86 kg/m .  Physical Exam   GENERAL: alert and no distress  ABDOMEN: soft, nontender, no hepatosplenomegaly, no masses and bowel sounds normal  MS: no gross musculoskeletal defects noted, no edema  PSYCH: mentation appears normal, affect normal/bright        Signed Electronically by: Jen Aparicio NP  "

## 2024-03-19 NOTE — PATIENT INSTRUCTIONS
Push fluids and take imodium 1/2 to 1 tab with each loose stool.  Max of 8 tabs daily.  If any worsening abdominal pain, follow-up in clinic.  If still having diarrhea over a total of 7 days, I recommend you contact my clinic and I will order stool samples for further evaluation.  If any dizziness or dehydration symptoms, follow-up in ER or ADS for fluids.

## 2024-03-26 ENCOUNTER — HOSPITAL ENCOUNTER (EMERGENCY)
Facility: CLINIC | Age: 69
Discharge: HOME | End: 2024-03-26
Attending: EMERGENCY MEDICINE | Admitting: EMERGENCY MEDICINE
Payer: COMMERCIAL

## 2024-03-26 VITALS
RESPIRATION RATE: 18 BRPM | HEART RATE: 74 BPM | HEIGHT: 66 IN | BODY MASS INDEX: 24.91 KG/M2 | DIASTOLIC BLOOD PRESSURE: 68 MMHG | OXYGEN SATURATION: 96 % | WEIGHT: 155 LBS | TEMPERATURE: 98.1 F | SYSTOLIC BLOOD PRESSURE: 135 MMHG

## 2024-03-26 DIAGNOSIS — K52.9 CHRONIC DIARRHEA: ICD-10-CM

## 2024-03-26 DIAGNOSIS — E86.1 HYPOVOLEMIA: ICD-10-CM

## 2024-03-26 LAB
ALBUMIN SERPL BCG-MCNC: 3.9 G/DL (ref 3.5–5.2)
ALP SERPL-CCNC: 99 U/L (ref 40–150)
ALT SERPL W P-5'-P-CCNC: 21 U/L (ref 0–70)
ANION GAP SERPL CALCULATED.3IONS-SCNC: 12 MMOL/L (ref 7–15)
AST SERPL W P-5'-P-CCNC: 27 U/L (ref 0–45)
BASOPHILS # BLD AUTO: 0.1 10E3/UL (ref 0–0.2)
BASOPHILS NFR BLD AUTO: 1 %
BILIRUB SERPL-MCNC: 0.3 MG/DL
BUN SERPL-MCNC: 34.7 MG/DL (ref 8–23)
CALCIUM SERPL-MCNC: 8.5 MG/DL (ref 8.8–10.2)
CHLORIDE SERPL-SCNC: 105 MMOL/L (ref 98–107)
CREAT SERPL-MCNC: 1.19 MG/DL (ref 0.67–1.17)
DEPRECATED HCO3 PLAS-SCNC: 18 MMOL/L (ref 22–29)
EGFRCR SERPLBLD CKD-EPI 2021: 67 ML/MIN/1.73M2
EOSINOPHIL # BLD AUTO: 0.2 10E3/UL (ref 0–0.7)
EOSINOPHIL NFR BLD AUTO: 2 %
ERYTHROCYTE [DISTWIDTH] IN BLOOD BY AUTOMATED COUNT: 13.5 % (ref 10–15)
GLUCOSE SERPL-MCNC: 91 MG/DL (ref 70–99)
HCT VFR BLD AUTO: 36.9 % (ref 40–53)
HGB BLD-MCNC: 12.7 G/DL (ref 13.3–17.7)
IMM GRANULOCYTES # BLD: 0 10E3/UL
IMM GRANULOCYTES NFR BLD: 0 %
LYMPHOCYTES # BLD AUTO: 3.6 10E3/UL (ref 0.8–5.3)
LYMPHOCYTES NFR BLD AUTO: 26 %
MAGNESIUM SERPL-MCNC: 2.2 MG/DL (ref 1.7–2.3)
MCH RBC QN AUTO: 30.8 PG (ref 26.5–33)
MCHC RBC AUTO-ENTMCNC: 34.4 G/DL (ref 31.5–36.5)
MCV RBC AUTO: 90 FL (ref 78–100)
MONOCYTES # BLD AUTO: 1 10E3/UL (ref 0–1.3)
MONOCYTES NFR BLD AUTO: 7 %
NEUTROPHILS # BLD AUTO: 8.7 10E3/UL (ref 1.6–8.3)
NEUTROPHILS NFR BLD AUTO: 64 %
NRBC # BLD AUTO: 0 10E3/UL
NRBC BLD AUTO-RTO: 0 /100
PLATELET # BLD AUTO: 332 10E3/UL (ref 150–450)
POTASSIUM SERPL-SCNC: 4.3 MMOL/L (ref 3.4–5.3)
PROT SERPL-MCNC: 7.1 G/DL (ref 6.4–8.3)
RBC # BLD AUTO: 4.12 10E6/UL (ref 4.4–5.9)
SODIUM SERPL-SCNC: 135 MMOL/L (ref 135–145)
WBC # BLD AUTO: 13.6 10E3/UL (ref 4–11)

## 2024-03-26 PROCEDURE — 99284 EMERGENCY DEPT VISIT MOD MDM: CPT | Mod: 25 | Performed by: EMERGENCY MEDICINE

## 2024-03-26 PROCEDURE — 258N000003 HC RX IP 258 OP 636: Performed by: EMERGENCY MEDICINE

## 2024-03-26 PROCEDURE — 93010 ELECTROCARDIOGRAM REPORT: CPT | Performed by: EMERGENCY MEDICINE

## 2024-03-26 PROCEDURE — 36415 COLL VENOUS BLD VENIPUNCTURE: CPT | Performed by: EMERGENCY MEDICINE

## 2024-03-26 PROCEDURE — 96360 HYDRATION IV INFUSION INIT: CPT | Performed by: EMERGENCY MEDICINE

## 2024-03-26 PROCEDURE — 93005 ELECTROCARDIOGRAM TRACING: CPT | Performed by: EMERGENCY MEDICINE

## 2024-03-26 PROCEDURE — 83735 ASSAY OF MAGNESIUM: CPT | Performed by: EMERGENCY MEDICINE

## 2024-03-26 PROCEDURE — 85025 COMPLETE CBC W/AUTO DIFF WBC: CPT | Performed by: EMERGENCY MEDICINE

## 2024-03-26 PROCEDURE — 80053 COMPREHEN METABOLIC PANEL: CPT | Performed by: EMERGENCY MEDICINE

## 2024-03-26 PROCEDURE — 96361 HYDRATE IV INFUSION ADD-ON: CPT | Performed by: EMERGENCY MEDICINE

## 2024-03-26 RX ADMIN — SODIUM CHLORIDE, POTASSIUM CHLORIDE, SODIUM LACTATE AND CALCIUM CHLORIDE 1000 ML: 600; 310; 30; 20 INJECTION, SOLUTION INTRAVENOUS at 10:25

## 2024-03-26 ASSESSMENT — ACTIVITIES OF DAILY LIVING (ADL)
ADLS_ACUITY_SCORE: 38

## 2024-03-26 ASSESSMENT — COLUMBIA-SUICIDE SEVERITY RATING SCALE - C-SSRS
1. IN THE PAST MONTH, HAVE YOU WISHED YOU WERE DEAD OR WISHED YOU COULD GO TO SLEEP AND NOT WAKE UP?: NO
2. HAVE YOU ACTUALLY HAD ANY THOUGHTS OF KILLING YOURSELF IN THE PAST MONTH?: NO
6. HAVE YOU EVER DONE ANYTHING, STARTED TO DO ANYTHING, OR PREPARED TO DO ANYTHING TO END YOUR LIFE?: NO

## 2024-03-26 NOTE — DISCHARGE INSTRUCTIONS
Drink plenty of fluids to stay hydrated. Follow up with GI for chronic diarrhea. I would recommend metamucil in the meantime if you aren't already taking this.     If you have increase in diarrhea and feel lightheaded, take your blood pressure at home, if you BP is less than 110 I would not take your metoprolol that day. You should discuss this further with your primary care provider.     If your symptoms worsen or you develop new or concerning symptoms, please return to the Emergency Department for further evaluation and treatment.
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
## 2024-03-26 NOTE — Clinical Note
Donnie Ernandez was seen and treated in our emergency department on 3/26/2024.  He may return to work on 03/27/2024.       If you have any questions or concerns, please don't hesitate to call.      Sanjay Macias MD

## 2024-03-26 NOTE — ED TRIAGE NOTES
Pt reports diarrhea bad for 2 wks, woke up today with light headedness at 0530, pt feels like he is going to pass out.      Triage Assessment (Adult)       Row Name 03/26/24 0942          Triage Assessment    Airway WDL WDL        Respiratory WDL    Respiratory WDL X;rhythm/pattern     Rhythm/Pattern, Respiratory shortness of breath        Cardiac WDL    Cardiac WDL WDL        Cognitive/Neuro/Behavioral WDL    Cognitive/Neuro/Behavioral WDL WDL  except light headedness today

## 2024-04-19 ENCOUNTER — TELEPHONE (OUTPATIENT)
Dept: SLEEP MEDICINE | Facility: CLINIC | Age: 69
End: 2024-04-19

## 2024-06-04 ENCOUNTER — OFFICE VISIT (OUTPATIENT)
Dept: FAMILY MEDICINE | Facility: CLINIC | Age: 69
End: 2024-06-04
Payer: COMMERCIAL

## 2024-06-04 VITALS
DIASTOLIC BLOOD PRESSURE: 58 MMHG | WEIGHT: 148 LBS | HEIGHT: 66 IN | HEART RATE: 73 BPM | OXYGEN SATURATION: 98 % | TEMPERATURE: 98.7 F | RESPIRATION RATE: 16 BRPM | BODY MASS INDEX: 23.78 KG/M2 | SYSTOLIC BLOOD PRESSURE: 100 MMHG

## 2024-06-04 DIAGNOSIS — R53.1 WEAKNESS: Primary | ICD-10-CM

## 2024-06-04 DIAGNOSIS — J34.89 NASAL DRAINAGE: ICD-10-CM

## 2024-06-04 LAB
ACANTHOCYTES BLD QL SMEAR: NORMAL
ALBUMIN SERPL BCG-MCNC: 4 G/DL (ref 3.5–5.2)
ALP SERPL-CCNC: 97 U/L (ref 40–150)
ALT SERPL W P-5'-P-CCNC: 25 U/L (ref 0–70)
AMYLASE SERPL-CCNC: 69 U/L (ref 28–100)
ANION GAP SERPL CALCULATED.3IONS-SCNC: 12 MMOL/L (ref 7–15)
AST SERPL W P-5'-P-CCNC: 36 U/L (ref 0–45)
AUER BODIES BLD QL SMEAR: NORMAL
BASO STIPL BLD QL SMEAR: NORMAL
BILIRUB SERPL-MCNC: 1.1 MG/DL
BITE CELLS BLD QL SMEAR: NORMAL
BLISTER CELLS BLD QL SMEAR: NORMAL
BUN SERPL-MCNC: 26.2 MG/DL (ref 8–23)
BURR CELLS BLD QL SMEAR: NORMAL
CALCIUM SERPL-MCNC: 9 MG/DL (ref 8.8–10.2)
CHLORIDE SERPL-SCNC: 108 MMOL/L (ref 98–107)
CREAT SERPL-MCNC: 1.46 MG/DL (ref 0.67–1.17)
DACRYOCYTES BLD QL SMEAR: NORMAL
DEPRECATED HCO3 PLAS-SCNC: 16 MMOL/L (ref 22–29)
EGFRCR SERPLBLD CKD-EPI 2021: 52 ML/MIN/1.73M2
ELLIPTOCYTES BLD QL SMEAR: NORMAL
FRAGMENTS BLD QL SMEAR: NORMAL
GIANT PLATELETS BLD QL SMEAR: NORMAL
GLUCOSE SERPL-MCNC: 115 MG/DL (ref 70–99)
HGB C CRYSTALS: NORMAL
HOWELL-JOLLY BOD BLD QL SMEAR: NORMAL
LIPASE SERPL-CCNC: 56 U/L (ref 13–60)
NEUTS HYPERSEG BLD QL SMEAR: NORMAL
PATH REV: NORMAL
PLAT MORPH BLD: NORMAL
POLYCHROMASIA BLD QL SMEAR: NORMAL
POTASSIUM SERPL-SCNC: 4.6 MMOL/L (ref 3.4–5.3)
PROT SERPL-MCNC: 7.2 G/DL (ref 6.4–8.3)
RBC AGGLUT BLD QL: NORMAL
RBC MORPH BLD: NORMAL
ROULEAUX BLD QL SMEAR: NORMAL
SICKLE CELLS BLD QL SMEAR: NORMAL
SMUDGE CELLS BLD QL SMEAR: NORMAL
SODIUM SERPL-SCNC: 136 MMOL/L (ref 135–145)
SPHEROCYTES BLD QL SMEAR: NORMAL
STOMATOCYTES BLD QL SMEAR: NORMAL
TARGETS BLD QL SMEAR: NORMAL
TOXIC GRANULES BLD QL SMEAR: NORMAL
VARIANT LYMPHS BLD QL SMEAR: NORMAL

## 2024-06-04 PROCEDURE — 80053 COMPREHEN METABOLIC PANEL: CPT | Performed by: FAMILY MEDICINE

## 2024-06-04 PROCEDURE — 36415 COLL VENOUS BLD VENIPUNCTURE: CPT | Performed by: FAMILY MEDICINE

## 2024-06-04 PROCEDURE — 82150 ASSAY OF AMYLASE: CPT | Performed by: FAMILY MEDICINE

## 2024-06-04 PROCEDURE — 99214 OFFICE O/P EST MOD 30 MIN: CPT | Performed by: FAMILY MEDICINE

## 2024-06-04 PROCEDURE — 83690 ASSAY OF LIPASE: CPT | Performed by: FAMILY MEDICINE

## 2024-06-04 RX ORDER — LEVOCETIRIZINE DIHYDROCHLORIDE 5 MG/1
5 TABLET, FILM COATED ORAL EVERY EVENING
Qty: 90 TABLET | Refills: 1 | Status: SHIPPED | OUTPATIENT
Start: 2024-06-04

## 2024-06-04 ASSESSMENT — ANXIETY QUESTIONNAIRES
GAD7 TOTAL SCORE: 0
4. TROUBLE RELAXING: NOT AT ALL
7. FEELING AFRAID AS IF SOMETHING AWFUL MIGHT HAPPEN: NOT AT ALL
8. IF YOU CHECKED OFF ANY PROBLEMS, HOW DIFFICULT HAVE THESE MADE IT FOR YOU TO DO YOUR WORK, TAKE CARE OF THINGS AT HOME, OR GET ALONG WITH OTHER PEOPLE?: NOT DIFFICULT AT ALL
3. WORRYING TOO MUCH ABOUT DIFFERENT THINGS: NOT AT ALL
6. BECOMING EASILY ANNOYED OR IRRITABLE: NOT AT ALL
1. FEELING NERVOUS, ANXIOUS, OR ON EDGE: NOT AT ALL
5. BEING SO RESTLESS THAT IT IS HARD TO SIT STILL: NOT AT ALL
IF YOU CHECKED OFF ANY PROBLEMS ON THIS QUESTIONNAIRE, HOW DIFFICULT HAVE THESE PROBLEMS MADE IT FOR YOU TO DO YOUR WORK, TAKE CARE OF THINGS AT HOME, OR GET ALONG WITH OTHER PEOPLE: NOT DIFFICULT AT ALL
GAD7 TOTAL SCORE: 0
GAD7 TOTAL SCORE: 0
7. FEELING AFRAID AS IF SOMETHING AWFUL MIGHT HAPPEN: NOT AT ALL
2. NOT BEING ABLE TO STOP OR CONTROL WORRYING: NOT AT ALL

## 2024-06-04 ASSESSMENT — PATIENT HEALTH QUESTIONNAIRE - PHQ9
SUM OF ALL RESPONSES TO PHQ QUESTIONS 1-9: 6
SUM OF ALL RESPONSES TO PHQ QUESTIONS 1-9: 6
10. IF YOU CHECKED OFF ANY PROBLEMS, HOW DIFFICULT HAVE THESE PROBLEMS MADE IT FOR YOU TO DO YOUR WORK, TAKE CARE OF THINGS AT HOME, OR GET ALONG WITH OTHER PEOPLE: NOT DIFFICULT AT ALL

## 2024-06-04 ASSESSMENT — PAIN SCALES - GENERAL: PAINLEVEL: NO PAIN (0)

## 2024-06-04 NOTE — LETTER
June 4, 2024      Donnie Ernandez  [address] [place] [id number]        To Whom It May Concern:    Donnie Ernandez was seen in our clinic. He may return to work without restrictions on 06/05/2024.      Sincerely,      Gumaro Pierre

## 2024-06-04 NOTE — PROGRESS NOTES
Assessment & Plan     (R53.1) Weakness  (primary encounter diagnosis)  Comment: Patient here for weakness and fatigue, doing slightly better. Labs ordered. Patient will be notified of results  Plan: CBC with platelets and differential,         Comprehensive metabolic panel (BMP + Alb, Alk         Phos, ALT, AST, Total. Bili, TP), Lipase,         Amylase            (J34.89) Nasal drainage  Comment: medication faxed.   Plan: levocetirizine (XYZAL) 5 MG tablet                    FUTURE APPOINTMENTS:       - Follow-up visit as needed.    Russ Fields is a 68 year old, presenting for the following health issues:  Patient is a 68-year-old male with past medical history significant for hypertension, alcohol abuse, chronic diarrhea, chronic kidney disease, history of CVA who comes in with weakness and fatigue.  He says he was at work on Friday and one of his coworkers noticed that he looked pale.  He continued to feel extremely tired and had to go home that day.  Over the weekend he says he was very fatigued and broke out in a cold sweat at some point.  He said he laid down and rested and increase fluid intake.  Of note he has had a chronic diarrhea for couple of months and he was being worked up at the [facility] then he had a stroke in December and he got sidetracked with the stroke symptoms and has not followed up with [facility].  He did have several episodes of diarrhea over the weekend to even yesterday but he says today it appears to be slowing down.  He has some mild epigastric pain/mid abdominal pain.  He denies fevers or chills.  He has not had any cold symptoms.  He does have constant nasal drainage this has been ongoing since his stroke.  He has tried over-the-counter allergy medicines without much relief.  He denies any other symptoms today.    Lightheaded (Started feeling ill on Friday.  Was told at work on Friday that he looked pale.  Has slept most of the weekend.  Not dizzy.), Nasal drainage  (Ever since his stoke in December he will have nasal drainage.  He is not able to feel this, it will just start to drain.  He will have to carry kleenex due to this.  ), Letter for School/Work (Needing a letter for work for being out yesterday and today. Wanting to return tomorrow.), and Imm/Inj (Declined all injections today with how he is feeling.)        6/4/2024     8:40 AM   Additional Questions   Roomed by Shraddha Varghese CMA         6/4/2024   Forms   Any forms needing to be completed Yes     Chief Complaint   Patient presents with    Lightheaded     Started feeling ill on Friday.  Was told at work on Friday that he looked pale.  Has slept most of the weekend.  Not dizzy.    Nasal drainage     Ever since his stoke in December he will have nasal drainage.  He is not able to feel this, it will just start to drain.  He will have to carry kleenex due to this.      Letter for School/Work     Needing a letter for work for being out yesterday and today. Wanting to return tomorrow.    Imm/Inj     Declined all injections today with how he is feeling.       History of Present Illness       Reason for visit:  Lighheaded with diarrhea and sweating over the weekend.    He eats 0-1 servings of fruits and vegetables daily.He consumes 0 sweetened beverage(s) daily.He exercises with enough effort to increase his heart rate 20 to 29 minutes per day.  He exercises with enough effort to increase his heart rate 5 days per week.   He is taking medications regularly.         Concern - Lightheaded   Onset: Friday-noticed he was pale at work and fatigue.  Description: Has been feeling lightheaded with diarrhea and sweating.  Yesterday he took a shower and dried off.  Bent over and broke out in a sweat.  Had to lay down for a few hours after.    States he has had diarrhea for awhile and was referred to GI.  Has not been able to go to the GI as he had a stroke and has not rescheduled yet.  Intensity: mild,  "moderate-yesterday.  Progression of Symptoms:  improving-feeling better than he did.  Accompanying Signs & Symptoms: Has been feeling fatigue.  Slept most of the weekend.  Nasal drainage-wanting to know if he may have a sinus infection.   Previous history of similar problem: None for the lightheadedness.  Precipitating factors:        Worsened by: None  Alleviating factors:        Improved by: Laying down and resting.  Therapies tried and outcome: Increased his fluids, Gatorade.           Review of Systems  CONSTITUTIONAL:POSITIVE  for fatigue, malaise, and sweats  ENT/MOUTH: POSITIVE for  rhinorrhea-clear  RESP: NEGATIVE for significant cough or SOB  CV: NEGATIVE for chest pain, palpitations or peripheral edema  GI: POSITIVE for diarrhea, and gas or bloating  : negative for dysuria, hematuria, decreased urinary stream, erectile dysfunction  MUSCULOSKELETAL: NEGATIVE for significant arthralgias or myalgia  NEURO: POSITIVE for dizziness/lightheadedness, and weakness   ENDOCRINE: NEGATIVE for temperature intolerance, skin/hair changes  HEME/ALLERGY/IMMUNE: NEGATIVE for bleeding problems  PSYCHIATRIC: NEGATIVE for changes in mood or affect      Objective    /58 (BP Location: Right arm, Patient Position: Chair, Cuff Size: Adult Regular)   Pulse 73   Temp 98.7  F (37.1  C) (Tympanic)   Resp 16   Ht 1.67 m (5' 5.75\")   Wt 67.1 kg (148 lb)   SpO2 98%   BMI 24.07 kg/m    Body mass index is 24.07 kg/m .  Physical Exam   GENERAL: alert and no distress  EYES: Eyes grossly normal to inspection, PERRL and conjunctivae and sclerae normal  HENT: ear canals and TM's normal, nose and mouth without ulcers or lesions  NECK: no adenopathy, no asymmetry, masses, or scars  RESP: lungs clear to auscultation - no rales, rhonchi or wheezes  CV: regular rate and rhythm, normal S1 S2, no S3 or S4, no murmur, click or rub, no peripheral edema  ABDOMEN: soft, nontender, no hepatosplenomegaly, no masses and bowel sounds " normal  MS: no gross musculoskeletal defects noted, no edema  SKIN: no suspicious lesions or rashes  PSYCH: mentation appears normal, affect normal/bright    Labs pending        Signed Electronically by: Gumaro Pierre MD

## 2024-06-09 NOTE — RESULT ENCOUNTER NOTE
Please inform patient that test result showed worsening of kidney test creatinine was elevated. Recommend hydration. Glucose was also slightly high. Other labs were okay. Recheck Cr in a few weeks   Thank you.     Gumaro Pierre M.D.

## 2024-06-16 ENCOUNTER — HEALTH MAINTENANCE LETTER (OUTPATIENT)
Age: 69
End: 2024-06-16

## 2024-07-01 ENCOUNTER — TELEPHONE (OUTPATIENT)
Dept: FAMILY MEDICINE | Facility: CLINIC | Age: 69
End: 2024-07-01

## 2024-07-01 ENCOUNTER — LAB (OUTPATIENT)
Dept: LAB | Facility: CLINIC | Age: 69
End: 2024-07-01
Payer: COMMERCIAL

## 2024-07-01 DIAGNOSIS — R79.89 ELEVATED SERUM CREATININE: ICD-10-CM

## 2024-07-01 DIAGNOSIS — R79.89 ELEVATED SERUM CREATININE: Primary | ICD-10-CM

## 2024-07-01 LAB
CREAT SERPL-MCNC: 1.14 MG/DL (ref 0.67–1.17)
EGFRCR SERPLBLD CKD-EPI 2021: 70 ML/MIN/1.73M2
HOLD SPECIMEN: NORMAL
HOLD SPECIMEN: NORMAL

## 2024-07-01 PROCEDURE — 36415 COLL VENOUS BLD VENIPUNCTURE: CPT

## 2024-07-01 PROCEDURE — 82565 ASSAY OF CREATININE: CPT

## 2024-07-01 NOTE — TELEPHONE ENCOUNTER
Lab requesting orders for patient.  Per 6/4/24 lab results, repeat creatinine in a few weeks.  Ordered.

## 2024-07-17 ENCOUNTER — VIRTUAL VISIT (OUTPATIENT)
Dept: FAMILY MEDICINE | Facility: CLINIC | Age: 69
End: 2024-07-17
Payer: COMMERCIAL

## 2024-07-17 DIAGNOSIS — F51.01 PRIMARY INSOMNIA: ICD-10-CM

## 2024-07-17 DIAGNOSIS — R11.0 NAUSEA: ICD-10-CM

## 2024-07-17 DIAGNOSIS — K52.9 GASTROENTERITIS: Primary | ICD-10-CM

## 2024-07-17 PROCEDURE — 99441 PR PHYSICIAN TELEPHONE EVALUATION 5-10 MIN: CPT | Mod: 93 | Performed by: NURSE PRACTITIONER

## 2024-07-17 RX ORDER — ZOLPIDEM TARTRATE 10 MG/1
10 TABLET ORAL
Qty: 30 TABLET | Refills: 5 | Status: SHIPPED | OUTPATIENT
Start: 2024-07-17

## 2024-07-17 RX ORDER — ONDANSETRON 4 MG/1
4 TABLET, ORALLY DISINTEGRATING ORAL EVERY 8 HOURS PRN
Qty: 20 TABLET | Refills: 0 | Status: SHIPPED | OUTPATIENT
Start: 2024-07-17

## 2024-07-17 ASSESSMENT — PATIENT HEALTH QUESTIONNAIRE - PHQ9: SUM OF ALL RESPONSES TO PHQ QUESTIONS 1-9: 2

## 2024-07-17 NOTE — LETTER
July 17, 2024      RE: Donnie Ernandez  [address] [place] [address]        To Whom It May Concern:      Donnie Ernandez was seen in our clinic. He may return to work 7/18/2024 without restrictions.        Sincerely,          MAYANK Lutz CNP

## 2024-07-17 NOTE — PROGRESS NOTES
Donnie is a 68 year old who is being evaluated via a billable telephone visit.    What phone number would you like to be contacted at? [phone]  How would you like to obtain your AVS? Mail a copy  Originating Location (pt. Location): Home    Distant Location (provider location):  On-site      Assessment & Plan     Gastroenteritis  Improving.  Patient would like a note to return to work tomorrow.    1. Drink plenty of fluids, water or juice. Avoid drinks with caffeine or artifical sweetners.   2. May use imodium sparingly as needed for diarrhea.   3. May use pepto-bismal, TUMS or prilosec if needed for stomach upset.   4. Monitor for signs of dehydration (severe dry mouth, dizziness, fainting).   5. Practice good hand washing.   6. Return to clinic for severe abdominal pain, uncontrolled vomiting that lasts more than 24 hours, fever, or bloody stools.      Nausea  Continues to have some intermittent nausea - would like a medication.  May use:  - ondansetron (ZOFRAN ODT) 4 MG ODT tab; Take 1 tablet (4 mg) by mouth every 8 hours as needed for nausea      Insomnia  Reports that he is too sleepy in the mornings.  Decrease dose to 10 mg at bedtime  Follow up if no improvement.  - zolpidem (AMBIEN) 10 MG tablet; Take 1 tablet (10 mg) by mouth nightly as needed for sleep      The risks, benefits and treatment options of prescribed medications or other treatments have been discussed with the patient. The patient verbalized their understanding and should call or follow up if no improvement or if they develop further problems.  Mae Jones, [place]                  Subjective   Donnie is a 68 year old, presenting for the following health issues:  Nausea (Thinks he has food poisoning-  )        7/17/2024    11:26 AM   Additional Questions   Roomed by Preeti PEPE CMA - Virtual visit.         7/17/2024   Forms   Any forms needing to be completed Yes          7/17/2024    11:26 AM   Patient Reported Additional Medications   Patient  reports taking the following new medications none       HPI     Concern - food poisoning?  Onset: one week ago  Description: patient thinks he had some sort of food poisoning  4-5 hours after eating lunch he started feeling sick   Has been out of work since 7/10/24-  will need a note to return to work  Intensity: mild today  But severe last weekend  Progression of Symptoms:  improving  Accompanying Signs & Symptoms: diarrhea, stomach pains  Loss of appetite  No fevers  Previous history of similar problem: yes  Precipitating factors:        Worsened by: bad food from vending machine.  Alleviating factors:        Improved by: time  Therapies tried and outcome: rolaids and imodium plus increase liquids.        Medication Followup of ambien for insomnia  Taking Medication as prescribed: yes  Side Effects:  yes - too sleepy in the morning, wants to decrease dose  Medication Helping Symptoms:  yes        Review of Systems  Constitutional, HEENT, cardiovascular, pulmonary, gi and gu systems are negative, except as otherwise noted.        Objective         Vitals:  No vitals were obtained today due to virtual visit.    Physical Exam   General: Alert and no distress //Respiratory: No audible wheeze, cough, or shortness of breath // Psychiatric:  Appropriate affect, tone, and pace of words            Phone call duration: 10 minutes  Signed Electronically by: MAYANK Lutz CNP

## 2024-09-12 ENCOUNTER — PATIENT OUTREACH (OUTPATIENT)
Dept: CARE COORDINATION | Facility: CLINIC | Age: 69
End: 2024-09-12
Payer: COMMERCIAL

## 2024-09-13 ENCOUNTER — TELEPHONE (OUTPATIENT)
Dept: SLEEP MEDICINE | Facility: CLINIC | Age: 69
End: 2024-09-13
Payer: COMMERCIAL

## 2024-09-13 NOTE — TELEPHONE ENCOUNTER
Pt was called to reschedule appt on 11/25 with Dr Boggs. M for them to call back. [facility] message sent also.      Nati Rincon    Marcella Alexandra

## 2024-09-18 ENCOUNTER — TELEPHONE (OUTPATIENT)
Dept: FAMILY MEDICINE | Facility: CLINIC | Age: 69
End: 2024-09-18
Payer: COMMERCIAL

## 2024-09-18 DIAGNOSIS — J34.9 NOSE SYMPTOM: Primary | ICD-10-CM

## 2024-09-18 NOTE — TELEPHONE ENCOUNTER
Donnie is wondering if Mae could get him a referral for ENT. He states he had a stroke back in December and since then his right nostril runs or drips constantly. States the drainage is clear most of the time but has to carry a kleenex with him all the time. He would like to find out what is going on in his nose. Thank you!    Lilo Storey RN

## 2024-09-18 NOTE — TELEPHONE ENCOUNTER
Pt did not  the phone, person who answered does not have consent to communicate. She will give him message to callback.    Esther Teresa on 9/18/2024 at 9:30 AM

## 2024-10-13 NOTE — PROGRESS NOTES
Chief Complaint   Patient presents with    Nose Problem     Since having a stroke back in Dec. 2023 he has had a constant running nose, mainly Rt side     History of Present Illness   Donnie Ernandez is a 69 year old male who presents for evaluation. He has a history of a stroke back in December of 2023. Since then his right nostril runs/drips constantly. He also notices it when he is eating.     The patient describes symptoms of constant running nose for the past 10 months. The patient notes no history of environmental allergies. They have no been tested for allergies in the past. Treatments have included nasal irrigations, nasal steroids, and no oral antihistamines. The treatments seem to kind of help. No history of nasal trauma. The patient reports no nasal obstruction, nasal congestion, rhinorrhea, no post nasal drainage, taste/smell disturbance, face pain/pressure/fullness. No history of epistaxis. No prior history of nose or sinus surgery. No history of smoking. No history of migraine headache. No  history of asthma. No history of aspirin/NSAID sensitivity.    No previous nose or sinus imaging.     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    Tubular adenoma    Pulmonary nodules    Chronic kidney disease, stage 3    Dilated bile duct    Gallbladder polyp    Diarrhea, unspecified type    Acute right arterial ischemic stroke, MCA (middle cerebral artery) (H)    Anemia due to unknown mechanism    Chronic back pain greater than 3 months duration    MDD (major depressive disorder), recurrent episode,  mild (H)    Acute renal failure superimposed on stage 2 chronic kidney disease (H)    Alcohol abuse with intoxication (H)    Stroke-like symptoms    Hemiplegia and hemiparesis following unspecified cerebrovascular disease affecting unspecified side (H)     Current Medications     Current Outpatient Medications:     Acetaminophen (TYLENOL EXTRA STRENGTH PO), Take 1,000 mg by mouth daily as needed , Disp: , Rfl:     Ascorbic Acid (VITAMIN C PO), , Disp: , Rfl:     aspirin (ASA) 81 MG chewable tablet, Take 1 tablet (81 mg) by mouth daily, Disp: , Rfl:     levocetirizine (XYZAL) 5 MG tablet, Take 1 tablet (5 mg) by mouth every evening (Patient not taking: Reported on 7/17/2024), Disp: 90 tablet, Rfl: 1    losartan (COZAAR) 100 MG tablet, Take 1 tablet (100 mg) by mouth daily, Disp: 90 tablet, Rfl: 3    metoprolol succinate ER (TOPROL XL) 100 MG 24 hr tablet, Take 1 tablet (100 mg) by mouth daily, Disp: 90 tablet, Rfl: 3    naltrexone (DEPADE/REVIA) 50 MG tablet, Take 1 tablet (50 mg) by mouth daily, Disp: 90 tablet, Rfl: 3    ondansetron (ZOFRAN ODT) 4 MG ODT tab, Take 1 tablet (4 mg) by mouth every 8 hours as needed for nausea, Disp: 20 tablet, Rfl: 0    rosuvastatin (CRESTOR) 10 MG tablet, Take 1 tablet (10 mg) by mouth daily, Disp: 90 tablet, Rfl: 3    VITAMIN D PO, 1000 once a day, Disp: , Rfl:     zolpidem (AMBIEN) 10 MG tablet, Take 1 tablet (10 mg) by mouth nightly as needed for sleep, Disp: 30 tablet, Rfl: 5    Allergies  Allergies   Allergen Reactions    Lisinopril Diarrhea and Cough    Advil [Antihistamines, Chlorpheniramine-Type] GI Disturbance    Amoxicillin-Pot Clavulanate GI Disturbance     Stomach ache    Azithromycin GI Disturbance     Severe diarrhea and abdominal pain.  side effects, not true allergy    Doxycycline GI Disturbance    Prednisone Other (See Comments)     Insomnia, Facial flushing, Sweating    Sulfa Antibiotics Hives       Social History   Social History     Socioeconomic History     Marital status:    Occupational History    Occupation:      Employer: [employer]   Tobacco Use    Smoking status: Never    Smokeless tobacco: Never   Vaping Use    Vaping status: Never Used   Substance and Sexual Activity    Alcohol use: Yes     Comment: very little    Drug use: No    Sexual activity: Not Currently     Partners: Female   Other Topics Concern    Parent/sibling w/ CABG, MI or angioplasty before 65F 55M? No     Social Determinants of Health     Financial Resource Strain: Low Risk  (12/20/2023)    Financial Resource Strain     Within the past 12 months, have you or your family members you live with been unable to get utilities (heat, electricity) when it was really needed?: No   Food Insecurity: High Risk (12/20/2023)    Food Insecurity     Within the past 12 months, did you worry that your food would run out before you got money to buy more?: Yes     Within the past 12 months, did the food you bought just not last and you didn t have money to get more?: No   Transportation Needs: Low Risk  (12/20/2023)    Transportation Needs     Within the past 12 months, has lack of transportation kept you from medical appointments, getting your medicines, non-medical meetings or appointments, work, or from getting things that you need?: No   Interpersonal Safety: Low Risk  (6/4/2024)    Interpersonal Safety     Do you feel physically and emotionally safe where you currently live?: Yes     Within the past 12 months, have you been hit, slapped, kicked or otherwise physically hurt by someone?: No     Within the past 12 months, have you been humiliated or emotionally abused in other ways by your partner or ex-partner?: No   Housing Stability: Low Risk  (12/20/2023)    Housing Stability     Do you have housing? : Yes     Are you worried about losing your housing?: No       Family History  Family History   Problem Relation Age of Onset    Gastrointestinal Disease Mother          CHROHNS    Gastrointestinal Disease Father     Respiratory Father         copd    LUNG DISEASE Father     Emphysema Father     Heart Failure Father     Crohn's Disease Father     Respiratory Brother          of pneumonia at age 9    Heart Disease Brother     C.A.D. Brother     Unknown/Adopted Maternal Grandfather     Unknown/Adopted Maternal Grandmother     Crohn's Disease Paternal Grandmother     LUNG DISEASE Paternal Grandfather     Aneurysm No family hx of     Brain Hemorrhage No family hx of     Brain Tumor No family hx of     Cancer No family hx of     Chiari Malformation No family hx of     Diabetes No family hx of     Seizure Disorder No family hx of     Cerebrovascular Disease No family hx of     Depression No family hx of     Migraines No family hx of     Parkinsonism No family hx of     Multiple Sclerosis No family hx of        Review of Systems  As per HPI and PMHx, otherwise 10+ comprehensive system review is negative.    Physical Exam  BP (!) 160/65 (BP Location: Left arm, Patient Position: Sitting, Cuff Size: Adult Regular)   Pulse 66   Temp 98.4  F (36.9  C) (Tympanic)   Wt 74.8 kg (165 lb)   SpO2 97%   BMI 26.83 kg/m    GENERAL: The patient is a pleasant, cooperative 69 year old male in no acute distress.  HEAD: Normocephalic, atraumatic. Hair and scalp are normal.  EYES: Pupils are equal, round, reactive to light and accommodation. Extraocular movements are intact. The sclera nonicteric without injection. The extraocular structures are normal.  EARS: Normal shape and symmetry. No tenderness when palpating the mastoid or tragal areas bilaterally. No mastoid erythema or fluctuance. Otoscopic exam on the right reveals no amount of cerumen. The right tympanic membrane is round, intact without evidence of effusion, good landmarks.  No retraction, granulation, or drainage. Otoscopic exam on the left reveals no amount of cerumen. The left tympanic membrane is round, intact without evidence of  effusion, good landmarks.  No retraction, granulation, or drainage.  NOSE: Nares are patent.  Nasal mucosa is pink. Hypertrophy of turbinates.   ORAL CAVITY: Lips are normal. Dentition is in good repair. Mucous membranes are moist. Tongue is mobile, protrudes to the midline.  Palate elevates symmetrically. Tonsils are +1. No erythema or exudate. No oral cavity or oropharyngeal masses, lesions, ulcerations, or leukoplakia.  NECK: Supple, trachea is midline. There is no palpable cervical lymphadenopathy or masses bilaterally. Palpation of the bilateral parotid and submandibular areas reveal no masses. No thyromegaly.    NEUROLOGIC: Cranial nerves II through XII are grossly intact. Voice is strong. Patient is House-Brackmann I/VI bilaterally.  CARDIOVASCULAR: Extremities are warm and well-perfused. No significant peripheral edema.  RESPIRATORY: Patient has nonlabored breathing without cough, wheeze, stridor.  PSYCHIATRIC: Patient is alert and oriented. Mood and affect appear normal.  SKIN: Warm and dry. No scalp, face, or neck lesions noted.      Procedure: Flexible Nasal Endoscopy  Indication: Chronic nose and sinus symptoms    To best visualize the sinonasal anatomy and due to the chief complaint and HPI, I proceeded with flexible fiberoptic nasal endoscopy.  The bilateral nasal cavities were anesthetized and decongested. The bilateralnasal cavities were then examined using flexible fiberoptic nasal endoscope. The right nasal cavity was without masses, polyps, or mucopurulence. The right middle turbinate and middle meatus was normal in appearance. The sphenoethmoid recess, inferior meatus, superior meatus, and frontal sinus outflow areas were clear. The left nasal cavity was without masses, polyps, or mucopurulence. The left middle turbinate and middle meatus was normal in appearance. The sphenoethmoid recess, inferior meatus, superior meatus, and frontal sinus outflow areas were clear. The sinonasalmucosa appeared  normal. The nasal septum straight. The inferior turbinates were hypertrophied. The nasopharynx had a normal appearance with normal Eustachian tube openings and fossa of Rosenmuller bilaterally. Normal adenoid tissue. The scope was removed. The patient tolerated the procedure well.    Assessment and Plan     ICD-10-CM    1. Vasomotor rhinitis  J30.0       2. Hypertrophy of both inferior nasal turbinates  J34.3       3. Runny nose  R09.89 Nasal Endoscopy, Diag      4. Nose symptom  J34.9 Adult ENT  Referral        It was my pleasure seeing Donnie Ernandez today in clinic. I feel that his symptoms are related to allergic rhinitis, therefore we will trial Atrovent nasal spray along with a sinus rinse for the next 2-3 months, then I will see him back in clinic for a recheck. If symptoms persist could consider a CT of the sinus.     MAYANK Frey [facility]  Otolaryngology  [facility]

## 2024-10-14 ENCOUNTER — OFFICE VISIT (OUTPATIENT)
Dept: OTOLARYNGOLOGY | Facility: CLINIC | Age: 69
End: 2024-10-14
Payer: COMMERCIAL

## 2024-10-14 ENCOUNTER — OFFICE VISIT (OUTPATIENT)
Dept: FAMILY MEDICINE | Facility: CLINIC | Age: 69
End: 2024-10-14
Payer: COMMERCIAL

## 2024-10-14 VITALS
SYSTOLIC BLOOD PRESSURE: 110 MMHG | BODY MASS INDEX: 26.52 KG/M2 | OXYGEN SATURATION: 99 % | TEMPERATURE: 98.3 F | HEART RATE: 54 BPM | WEIGHT: 165 LBS | RESPIRATION RATE: 20 BRPM | HEIGHT: 66 IN | DIASTOLIC BLOOD PRESSURE: 60 MMHG

## 2024-10-14 VITALS
DIASTOLIC BLOOD PRESSURE: 65 MMHG | HEART RATE: 66 BPM | SYSTOLIC BLOOD PRESSURE: 160 MMHG | BODY MASS INDEX: 26.83 KG/M2 | WEIGHT: 165 LBS | TEMPERATURE: 98.4 F | OXYGEN SATURATION: 97 %

## 2024-10-14 DIAGNOSIS — I10 BENIGN ESSENTIAL HYPERTENSION: Primary | ICD-10-CM

## 2024-10-14 DIAGNOSIS — Z12.5 SCREENING FOR PROSTATE CANCER: ICD-10-CM

## 2024-10-14 DIAGNOSIS — N18.31 STAGE 3A CHRONIC KIDNEY DISEASE (H): ICD-10-CM

## 2024-10-14 DIAGNOSIS — J30.0 VASOMOTOR RHINITIS: Primary | ICD-10-CM

## 2024-10-14 DIAGNOSIS — J34.3 HYPERTROPHY OF BOTH INFERIOR NASAL TURBINATES: ICD-10-CM

## 2024-10-14 DIAGNOSIS — Z86.73 HISTORY OF STROKE: ICD-10-CM

## 2024-10-14 DIAGNOSIS — F10.10 ALCOHOL ABUSE: ICD-10-CM

## 2024-10-14 DIAGNOSIS — R09.89 RUNNY NOSE: ICD-10-CM

## 2024-10-14 DIAGNOSIS — J34.9 NOSE SYMPTOM: ICD-10-CM

## 2024-10-14 PROBLEM — N18.2 ACUTE RENAL FAILURE SUPERIMPOSED ON STAGE 2 CHRONIC KIDNEY DISEASE (H): Status: RESOLVED | Noted: 2023-12-09 | Resolved: 2024-10-14

## 2024-10-14 PROBLEM — I63.511 ACUTE RIGHT ARTERIAL ISCHEMIC STROKE, MCA (MIDDLE CEREBRAL ARTERY) (H): Status: RESOLVED | Noted: 2023-12-09 | Resolved: 2024-10-14

## 2024-10-14 PROBLEM — N17.9 ACUTE RENAL FAILURE SUPERIMPOSED ON STAGE 2 CHRONIC KIDNEY DISEASE (H): Status: RESOLVED | Noted: 2023-12-09 | Resolved: 2024-10-14

## 2024-10-14 LAB
ANION GAP SERPL CALCULATED.3IONS-SCNC: 12 MMOL/L (ref 7–15)
BUN SERPL-MCNC: 11.2 MG/DL (ref 8–23)
CALCIUM SERPL-MCNC: 8.8 MG/DL (ref 8.8–10.4)
CHLORIDE SERPL-SCNC: 105 MMOL/L (ref 98–107)
CHOLEST SERPL-MCNC: 137 MG/DL
CREAT SERPL-MCNC: 1.02 MG/DL (ref 0.67–1.17)
CREAT UR-MCNC: 228.7 MG/DL
EGFRCR SERPLBLD CKD-EPI 2021: 80 ML/MIN/1.73M2
FASTING STATUS PATIENT QL REPORTED: YES
FASTING STATUS PATIENT QL REPORTED: YES
GLUCOSE SERPL-MCNC: 104 MG/DL (ref 70–99)
HCO3 SERPL-SCNC: 22 MMOL/L (ref 22–29)
HDLC SERPL-MCNC: 48 MG/DL
LDLC SERPL CALC-MCNC: 56 MG/DL
MICROALBUMIN UR-MCNC: <12 MG/L
MICROALBUMIN/CREAT UR: NORMAL MG/G{CREAT}
NONHDLC SERPL-MCNC: 89 MG/DL
POTASSIUM SERPL-SCNC: 3.8 MMOL/L (ref 3.4–5.3)
PSA SERPL DL<=0.01 NG/ML-MCNC: 3.45 NG/ML (ref 0–4.5)
SODIUM SERPL-SCNC: 139 MMOL/L (ref 135–145)
TRIGL SERPL-MCNC: 164 MG/DL

## 2024-10-14 PROCEDURE — 99203 OFFICE O/P NEW LOW 30 MIN: CPT | Mod: 25

## 2024-10-14 PROCEDURE — 82570 ASSAY OF URINE CREATININE: CPT | Performed by: NURSE PRACTITIONER

## 2024-10-14 PROCEDURE — 80048 BASIC METABOLIC PNL TOTAL CA: CPT | Performed by: NURSE PRACTITIONER

## 2024-10-14 PROCEDURE — 82043 UR ALBUMIN QUANTITATIVE: CPT | Performed by: NURSE PRACTITIONER

## 2024-10-14 PROCEDURE — 99214 OFFICE O/P EST MOD 30 MIN: CPT | Mod: 25 | Performed by: NURSE PRACTITIONER

## 2024-10-14 PROCEDURE — 90662 IIV NO PRSV INCREASED AG IM: CPT | Performed by: NURSE PRACTITIONER

## 2024-10-14 PROCEDURE — 80061 LIPID PANEL: CPT | Performed by: NURSE PRACTITIONER

## 2024-10-14 PROCEDURE — G0103 PSA SCREENING: HCPCS | Performed by: NURSE PRACTITIONER

## 2024-10-14 PROCEDURE — 36415 COLL VENOUS BLD VENIPUNCTURE: CPT | Performed by: NURSE PRACTITIONER

## 2024-10-14 PROCEDURE — 90472 IMMUNIZATION ADMIN EACH ADD: CPT | Performed by: NURSE PRACTITIONER

## 2024-10-14 PROCEDURE — 31231 NASAL ENDOSCOPY DX: CPT

## 2024-10-14 PROCEDURE — 90715 TDAP VACCINE 7 YRS/> IM: CPT | Performed by: NURSE PRACTITIONER

## 2024-10-14 PROCEDURE — 90471 IMMUNIZATION ADMIN: CPT | Performed by: NURSE PRACTITIONER

## 2024-10-14 RX ORDER — IPRATROPIUM BROMIDE 42 UG/1
2 SPRAY, METERED NASAL 4 TIMES DAILY
Qty: 15 ML | Refills: 3 | Status: SHIPPED | OUTPATIENT
Start: 2024-10-14

## 2024-10-14 RX ORDER — NALTREXONE HYDROCHLORIDE 50 MG/1
50 TABLET, FILM COATED ORAL DAILY
Qty: 90 TABLET | Refills: 3 | Status: SHIPPED | OUTPATIENT
Start: 2024-10-14

## 2024-10-14 RX ORDER — METOPROLOL SUCCINATE 100 MG/1
100 TABLET, EXTENDED RELEASE ORAL DAILY
Qty: 90 TABLET | Refills: 3 | Status: SHIPPED | OUTPATIENT
Start: 2024-10-14

## 2024-10-14 RX ORDER — ROSUVASTATIN CALCIUM 10 MG/1
10 TABLET, COATED ORAL DAILY
Qty: 90 TABLET | Refills: 3 | Status: SHIPPED | OUTPATIENT
Start: 2024-10-14

## 2024-10-14 RX ORDER — LOSARTAN POTASSIUM 100 MG/1
100 TABLET ORAL DAILY
Qty: 90 TABLET | Refills: 3 | Status: SHIPPED | OUTPATIENT
Start: 2024-10-14

## 2024-10-14 ASSESSMENT — PAIN SCALES - GENERAL: PAINLEVEL: NO PAIN (0)

## 2024-10-14 NOTE — NURSING NOTE
"Initial BP (!) 160/65 (BP Location: Left arm, Patient Position: Sitting, Cuff Size: Adult Regular)   Pulse 66   Temp 98.4  F (36.9  C) (Tympanic)   Wt 74.8 kg (165 lb)   SpO2 97%   BMI 26.83 kg/m   Estimated body mass index is 26.83 kg/m  as calculated from the following:    Height as of an earlier encounter on 10/14/24: 1.67 m (5' 5.75\").    Weight as of this encounter: 74.8 kg (165 lb). .  Lisa Machuca MA on 10/14/2024 at 8:21 AM    "

## 2024-10-14 NOTE — PROGRESS NOTES
"  Assessment & Plan     Benign essential hypertension  Well controlled.  - losartan (COZAAR) 100 MG tablet; Take 1 tablet (100 mg) by mouth daily.  - metoprolol succinate ER (TOPROL XL) 100 MG 24 hr tablet; Take 1 tablet (100 mg) by mouth daily.  - Basic metabolic panel  (Ca, Cl, CO2, Creat, Gluc, K, Na, BUN); Future  - Basic metabolic panel  (Ca, Cl, CO2, Creat, Gluc, K, Na, BUN)    Alcohol abuse  Patient has stopping drinking all alcohol.  Naltrexone is working well to control cravings - he would like to continue.  - naltrexone (DEPADE/REVIA) 50 MG tablet; Take 1 tablet (50 mg) by mouth daily.    History of stroke  Continue statin and aspirin  - rosuvastatin (CRESTOR) 10 MG tablet; Take 1 tablet (10 mg) by mouth daily.  - Lipid panel reflex to direct LDL Fasting; Future  - Lipid panel reflex to direct LDL Fasting    Stage 3a chronic kidney disease (H)  - Albumin Random Urine Quantitative with Creat Ratio; Future  - Albumin Random Urine Quantitative with Creat Ratio    Screening for prostate cancer  - PSA, screen; Future  - PSA, screen      BMI  Estimated body mass index is 26.83 kg/m  as calculated from the following:    Height as of this encounter: 1.67 m (5' 5.75\").    Weight as of this encounter: 74.8 kg (165 lb).     The risks, benefits and treatment options of prescribed medications or other treatments have been discussed with the patient. The patient verbalized their understanding and should call or follow up if no improvement or if they develop further problems.  Mae Jones CNP                Russ Fields is a 69 year old, presenting for the following health issues:  Hypertension and Lipids        10/14/2024     7:18 AM   Additional Questions   Roomed by Radha Huang CMA     HPI       Hypertension Follow-up    Do you check your blood pressure regularly outside of the clinic? No   Are you following a low salt diet? Yes  Are your blood pressures ever more than 140 on the top number (systolic) " "OR more   than 90 on the bottom number (diastolic), for example 140/90? No  How many servings of fruits and vegetables do you eat daily?  2-3  On average, how many sweetened beverages do you drink each day (Examples: soda, juice, sweet tea, etc.  Do NOT count diet or artificially sweetened beverages)?   0  How many days per week do you exercise enough to make your heart beat faster? 5  How many minutes a day do you exercise enough to make your heart beat faster? 30 - 60  How many days per week do you miss taking your medication? 0    Hyperlipidemia Follow-Up    Are you regularly taking any medication or supplement to lower your cholesterol?   Yes- Liptor   Are you having muscle aches or other side effects that you think could be caused by your cholesterol lowering medication?  No    Chronic Kidney Disease Follow-up    Do you take any over the counter pain medicine?: No      Medication Followup of naltrexone for alcohol dependence   Taking Medication as prescribed: yes  Side Effects:  None  Medication Helping Symptoms:  yes         Review of Systems  Constitutional, HEENT, cardiovascular, pulmonary, GI, , musculoskeletal, neuro, skin, endocrine and psych systems are negative, except as otherwise noted.        Objective    /60   Pulse 54   Temp 98.3  F (36.8  C) (Tympanic)   Resp 20   Ht 1.67 m (5' 5.75\")   Wt 74.8 kg (165 lb)   SpO2 99%   BMI 26.83 kg/m    Body mass index is 26.83 kg/m .  Physical Exam   GENERAL: alert and no distress  NECK: no adenopathy, no asymmetry, masses, or scars  RESP: lungs clear to auscultation - no rales, rhonchi or wheezes  CV: regular rate and rhythm, normal S1 S2, no S3 or S4, no murmur, click or rub, no peripheral edema  MS: no gross musculoskeletal defects noted, no edema            The longitudinal plan of care for the diagnosis(es)/condition(s) as documented were addressed during this visit. Due to the added complexity in care, I will continue to support Donnie in the " subsequent management and with ongoing continuity of care.    Signed Electronically by: MAYANK Lutz CNP

## 2024-10-14 NOTE — LETTER
10/14/2024      Donnie Ernandez  [address] [place] [address]  [place] [address]      Dear Colleague,    Thank you for referring your patient, Donnie Ernandez, to the [facility]. Please see a copy of my visit note below.    Chief Complaint   Patient presents with     Nose Problem     Since having a stroke back in Dec. 2023 he has had a constant running nose, mainly Rt side     History of Present Illness   Donnie Ernandez is a 69 year old male who presents for evaluation. He has a history of a stroke back in December of 2023. Since then his right nostril runs/drips constantly. He also notices it when he is eating.     The patient describes symptoms of constant running nose for the past 10 months. The patient notes no history of environmental allergies. They have no been tested for allergies in the past. Treatments have included nasal irrigations, nasal steroids, and no oral antihistamines. The treatments seem to kind of help. No history of nasal trauma. The patient reports no nasal obstruction, nasal congestion, rhinorrhea, no post nasal drainage, taste/smell disturbance, face pain/pressure/fullness. No history of epistaxis. No prior history of nose or sinus surgery. No history of smoking. No history of migraine headache. No  history of asthma. No history of aspirin/NSAID sensitivity.    No previous nose or sinus imaging.     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     Tubular adenoma     Pulmonary nodules     Chronic kidney disease, stage 3     Dilated bile duct      Gallbladder polyp     Diarrhea, unspecified type     Acute right arterial ischemic stroke, MCA (middle cerebral artery) (H)     Anemia due to unknown mechanism     Chronic back pain greater than 3 months duration     MDD (major depressive disorder), recurrent episode, mild (H)     Acute renal failure superimposed on stage 2 chronic kidney disease (H)     Alcohol abuse with intoxication (H)     Stroke-like symptoms     Hemiplegia and hemiparesis following unspecified cerebrovascular disease affecting unspecified side (H)     Current Medications     Current Outpatient Medications:      Acetaminophen (TYLENOL EXTRA STRENGTH PO), Take 1,000 mg by mouth daily as needed , Disp: , Rfl:      Ascorbic Acid (VITAMIN C PO), , Disp: , Rfl:      aspirin (ASA) 81 MG chewable tablet, Take 1 tablet (81 mg) by mouth daily, Disp: , Rfl:      levocetirizine (XYZAL) 5 MG tablet, Take 1 tablet (5 mg) by mouth every evening (Patient not taking: Reported on 7/17/2024), Disp: 90 tablet, Rfl: 1     losartan (COZAAR) 100 MG tablet, Take 1 tablet (100 mg) by mouth daily, Disp: 90 tablet, Rfl: 3     metoprolol succinate ER (TOPROL XL) 100 MG 24 hr tablet, Take 1 tablet (100 mg) by mouth daily, Disp: 90 tablet, Rfl: 3     naltrexone (DEPADE/REVIA) 50 MG tablet, Take 1 tablet (50 mg) by mouth daily, Disp: 90 tablet, Rfl: 3     ondansetron (ZOFRAN ODT) 4 MG ODT tab, Take 1 tablet (4 mg) by mouth every 8 hours as needed for nausea, Disp: 20 tablet, Rfl: 0     rosuvastatin (CRESTOR) 10 MG tablet, Take 1 tablet (10 mg) by mouth daily, Disp: 90 tablet, Rfl: 3     VITAMIN D PO, 1000 once a day, Disp: , Rfl:      zolpidem (AMBIEN) 10 MG tablet, Take 1 tablet (10 mg) by mouth nightly as needed for sleep, Disp: 30 tablet, Rfl: 5    Allergies  Allergies   Allergen Reactions     Lisinopril Diarrhea and Cough     Advil [Antihistamines, Chlorpheniramine-Type] GI Disturbance     Amoxicillin-Pot Clavulanate GI Disturbance     Stomach ache     Azithromycin GI  Disturbance     Severe diarrhea and abdominal pain.  side effects, not true allergy     Doxycycline GI Disturbance     Prednisone Other (See Comments)     Insomnia, Facial flushing, Sweating     Sulfa Antibiotics Hives       Social History   Social History     Socioeconomic History     Marital status:    Occupational History     Occupation:      Employer: [employer]   Tobacco Use     Smoking status: Never     Smokeless tobacco: Never   Vaping Use     Vaping status: Never Used   Substance and Sexual Activity     Alcohol use: Yes     Comment: very little     Drug use: No     Sexual activity: Not Currently     Partners: Female   Other Topics Concern     Parent/sibling w/ CABG, MI or angioplasty before 65F 55M? No     Social Determinants of Health     Financial Resource Strain: Low Risk  (12/20/2023)    Financial Resource Strain      Within the past 12 months, have you or your family members you live with been unable to get utilities (heat, electricity) when it was really needed?: No   Food Insecurity: High Risk (12/20/2023)    Food Insecurity      Within the past 12 months, did you worry that your food would run out before you got money to buy more?: Yes      Within the past 12 months, did the food you bought just not last and you didn t have money to get more?: No   Transportation Needs: Low Risk  (12/20/2023)    Transportation Needs      Within the past 12 months, has lack of transportation kept you from medical appointments, getting your medicines, non-medical meetings or appointments, work, or from getting things that you need?: No   Interpersonal Safety: Low Risk  (6/4/2024)    Interpersonal Safety      Do you feel physically and emotionally safe where you currently live?: Yes      Within the past 12 months, have you been hit, slapped, kicked or otherwise physically hurt by someone?: No      Within the past 12 months, have you been humiliated or emotionally abused in other  ways by your partner or ex-partner?: No   Housing Stability: Low Risk  (2023)    Housing Stability      Do you have housing? : Yes      Are you worried about losing your housing?: No       Family History  Family History   Problem Relation Age of Onset     Gastrointestinal Disease Mother         CHROHNS     Gastrointestinal Disease Father      Respiratory Father         copd     LUNG DISEASE Father      Emphysema Father      Heart Failure Father      Crohn's Disease Father      Respiratory Brother          of pneumonia at age 9     Heart Disease Brother      C.A.D. Brother      Unknown/Adopted Maternal Grandfather      Unknown/Adopted Maternal Grandmother      Crohn's Disease Paternal Grandmother      LUNG DISEASE Paternal Grandfather      Aneurysm No family hx of      Brain Hemorrhage No family hx of      Brain Tumor No family hx of      Cancer No family hx of      Chiari Malformation No family hx of      Diabetes No family hx of      Seizure Disorder No family hx of      Cerebrovascular Disease No family hx of      Depression No family hx of      Migraines No family hx of      Parkinsonism No family hx of      Multiple Sclerosis No family hx of        Review of Systems  As per HPI and PMHx, otherwise 10+ comprehensive system review is negative.    Physical Exam  BP (!) 160/65 (BP Location: Left arm, Patient Position: Sitting, Cuff Size: Adult Regular)   Pulse 66   Temp 98.4  F (36.9  C) (Tympanic)   Wt 74.8 kg (165 lb)   SpO2 97%   BMI 26.83 kg/m    GENERAL: The patient is a pleasant, cooperative 69 year old male in no acute distress.  HEAD: Normocephalic, atraumatic. Hair and scalp are normal.  EYES: Pupils are equal, round, reactive to light and accommodation. Extraocular movements are intact. The sclera nonicteric without injection. The extraocular structures are normal.  EARS: Normal shape and symmetry. No tenderness when palpating the mastoid or tragal areas bilaterally. No mastoid erythema or  fluctuance. Otoscopic exam on the right reveals no amount of cerumen. The right tympanic membrane is round, intact without evidence of effusion, good landmarks.  No retraction, granulation, or drainage. Otoscopic exam on the left reveals no amount of cerumen. The left tympanic membrane is round, intact without evidence of effusion, good landmarks.  No retraction, granulation, or drainage.  NOSE: Nares are patent.  Nasal mucosa is pink. Hypertrophy of turbinates.   ORAL CAVITY: Lips are normal. Dentition is in good repair. Mucous membranes are moist. Tongue is mobile, protrudes to the midline.  Palate elevates symmetrically. Tonsils are +1. No erythema or exudate. No oral cavity or oropharyngeal masses, lesions, ulcerations, or leukoplakia.  NECK: Supple, trachea is midline. There is no palpable cervical lymphadenopathy or masses bilaterally. Palpation of the bilateral parotid and submandibular areas reveal no masses. No thyromegaly.    NEUROLOGIC: Cranial nerves II through XII are grossly intact. Voice is strong. Patient is House-Brackmann I/VI bilaterally.  CARDIOVASCULAR: Extremities are warm and well-perfused. No significant peripheral edema.  RESPIRATORY: Patient has nonlabored breathing without cough, wheeze, stridor.  PSYCHIATRIC: Patient is alert and oriented. Mood and affect appear normal.  SKIN: Warm and dry. No scalp, face, or neck lesions noted.      Procedure: Flexible Nasal Endoscopy  Indication: Chronic nose and sinus symptoms    To best visualize the sinonasal anatomy and due to the chief complaint and HPI, I proceeded with flexible fiberoptic nasal endoscopy.  The bilateral nasal cavities were anesthetized and decongested. The bilateralnasal cavities were then examined using flexible fiberoptic nasal endoscope. The right nasal cavity was without masses, polyps, or mucopurulence. The right middle turbinate and middle meatus was normal in appearance. The sphenoethmoid recess, inferior meatus, superior  meatus, and frontal sinus outflow areas were clear. The left nasal cavity was without masses, polyps, or mucopurulence. The left middle turbinate and middle meatus was normal in appearance. The sphenoethmoid recess, inferior meatus, superior meatus, and frontal sinus outflow areas were clear. The sinonasalmucosa appeared normal. The nasal septum straight. The inferior turbinates were hypertrophied. The nasopharynx had a normal appearance with normal Eustachian tube openings and fossa of Rosenmuller bilaterally. Normal adenoid tissue. The scope was removed. The patient tolerated the procedure well.    Assessment and Plan     ICD-10-CM    1. Seasonal allergic rhinitis, unspecified trigger  J30.2 ipratropium (ATROVENT) 0.06 % nasal spray      2. Hypertrophy of both inferior nasal turbinates  J34.3       3. Runny nose  R09.89 Nasal Endoscopy, Diag      4. Nose symptom  J34.9 Adult ENT  Referral        It was my pleasure seeing Donnie Ernandez today in clinic. I feel that his symptoms are related to allergic rhinitis, therefore we will trial Atrovent nasal spray along with a sinus rinse for the next 2-3 months, then I will see him back in clinic for a recheck. If symptoms persist could consider a CT of the sinus.     MAYANK Frey CNP  Otolaryngology  [place] [facility] [place]      Again, thank you for allowing me to participate in the care of your patient.        Sincerely,        MAYANK Frey CNP

## 2024-10-14 NOTE — PATIENT INSTRUCTIONS
You were seen by Hari Leahy CNP.  If you have questions or concerns regarding your appointment today, you can reach out to our call center at [phone].  The following has been recommended at your appointment today:    Start using the saline nasal rinse twice daily.   You may use the Atrovent nasal spray up to four times daily. Let's start you using it in the AM and at bedtime at first. If you need to you may increase the amount of times you take it up to 4 times daily. Use 25-30 minutes after the nasal rinse.     NASAL SALINE IRRIGATION INSTRUCTIONS    You will be starting nasal saline irrigations and will need to obtain the following:      - NeilMed Sinus Rinse 8 oz Kit  - Distilled or filtered water   - Normal saline salt packets    Place filtered or distilled water into the NeilMed bottle up to the fill line (DO NOT USE TAP OR WELL WATER).  Place the pre-made salt packet in the 8 oz of saline.  Shake the bottle to suspend into solution.  Lean head forward over a sink or a basin.  Rinse each side of the nose with one-half of the bottle (each squeeze is about one-half of the bottle). Rinse the nose daily.     If you use topical nasal sprays, apply following irrigation.    Video example: https://www.youCodingpeople.com/watch?v=IX4tdLv7Rv8          
Statement Selected

## 2024-10-14 NOTE — LETTER
October 22, 2024      Donnie Ernandez  [address] [place] [address] [place] [address]        Dear ,    We are writing to inform you of your test results.    Kidney function and electrolytes are normal.  Cholesterol is well-controlled on current medication.  Urine was negative for protein.  PSA (prostate cancer screening test) was normal.  Your blood sugar was borderline at 104.  Continue to work on reducing the amount of added sugars in your diet.  Mae Jones, CNP    Resulted Orders   Basic metabolic panel  (Ca, Cl, CO2, Creat, Gluc, K, Na, BUN)   Result Value Ref Range    Sodium 139 135 - 145 mmol/L    Potassium 3.8 3.4 - 5.3 mmol/L    Chloride 105 98 - 107 mmol/L    Carbon Dioxide (CO2) 22 22 - 29 mmol/L    Anion Gap 12 7 - 15 mmol/L    Urea Nitrogen 11.2 8.0 - 23.0 mg/dL    Creatinine 1.02 0.67 - 1.17 mg/dL    GFR Estimate 80 >60 mL/min/1.73m2      Comment:      eGFR calculated using 2021 CKD-EPI equation.    Calcium 8.8 8.8 - 10.4 mg/dL      Comment:      Reference intervals for this test were updated on 7/16/2024 to reflect our healthy population more accurately. There may be differences in the flagging of prior results with similar values performed with this method. Those prior results can be interpreted in the context of the updated reference intervals.    Glucose 104 (H) 70 - 99 mg/dL    Patient Fasting > 8hrs? Yes    PSA, screen   Result Value Ref Range    Prostate Specific Antigen Screen 3.45 0.00 - 4.50 ng/mL    Narrative    This result is obtained using the Roche Elecsys total PSA method on the jann [id number] immunoassay analyzer, which is an ultrasensitive method. Results obtained with different assay methods or kits cannot be used interchangeably.  This test is intended for initial prostate cancer screening. PSA values exceeding the age-specific limits are suspicious for prostate disease, but additional testing, such as prostate biopsy, is needed to diagnose prostate pathology. The American Cancer  Society recommends annual examination with digital rectal examination and serum PSA beginning at age 50 and for men with a life expectancy of at least 10 years after detection of prostate cancer. For men in high-risk groups, such as  Americans or men with a first-degree relative diagnosed at a younger age, testing should begin at a younger age. It is generally recommended that information be provided to patients about the benefits and limitations of testing and treatment so they can make informed decisions.   Lipid panel reflex to direct LDL Fasting   Result Value Ref Range    Cholesterol 137 <200 mg/dL    Triglycerides 164 (H) <150 mg/dL    Direct Measure HDL 48 >=40 mg/dL    LDL Cholesterol Calculated 56 <100 mg/dL    Non HDL Cholesterol 89 <130 mg/dL    Patient Fasting > 8hrs? Yes     Narrative    Cholesterol  Desirable: < 200 mg/dL  Borderline High: 200 - 239 mg/dL  High: >= 240 mg/dL    Triglycerides  Normal: < 150 mg/dL  Borderline High: 150 - 199 mg/dL  High: 200-499 mg/dL  Very High: >= 500 mg/dL    Direct Measure HDL  Female: >= 50 mg/dL   Male: >= 40 mg/dL    LDL Cholesterol  Desirable: < 100 mg/dL  Above Desirable: 100 - 129 mg/dL   Borderline High: 130 - 159 mg/dL   High:  160 - 189 mg/dL   Very High: >= 190 mg/dL    Non HDL Cholesterol  Desirable: < 130 mg/dL  Above Desirable: 130 - 159 mg/dL  Borderline High: 160 - 189 mg/dL  High: 190 - 219 mg/dL  Very High: >= 220 mg/dL   Albumin Random Urine Quantitative with Creat Ratio   Result Value Ref Range    Creatinine Urine mg/dL 228.7 mg/dL      Comment:      The reference ranges have not been established in urine creatinine. The results should be integrated into the clinical context for interpretation.    Albumin Urine mg/L <12.0 mg/L      Comment:      The reference ranges have not been established in urine albumin. The results should be integrated into the clinical context for interpretation.    Albumin Urine mg/g Cr        Comment:      Unable to  calculate, urine albumin and/or urine creatinine is outside detectable limits.  Microalbuminuria is defined as an albumin:creatinine ratio of 17 to 299 for males and 25 to 299 for females. A ratio of albumin:creatinine of 300 or higher is indicative of overt proteinuria.  Due to biologic variability, positive results should be confirmed by a second, first-morning random or 24-hour timed urine specimen. If there is discrepancy, a third specimen is recommended. When 2 out of 3 results are in the microalbuminuria range, this is evidence for incipient nephropathy and warrants increased efforts at glucose control, blood pressure control, and institution of therapy with an angiotensin-converting-enzyme (ACE) inhibitor (if the patient can tolerate it).         If you have any questions or concerns, please call the clinic at the number listed above.       Sincerely,      MAYANK Lutz CNP

## 2024-11-04 ENCOUNTER — TELEPHONE (OUTPATIENT)
Dept: FAMILY MEDICINE | Facility: CLINIC | Age: 69
End: 2024-11-04
Payer: COMMERCIAL

## 2024-11-04 NOTE — TELEPHONE ENCOUNTER
Kidney function and electrolytes are normal.  Cholesterol is well-controlled on current medication.  Urine was negative for protein.  PSA (prostate cancer screening test) was normal.  Your blood sugar was borderline at 104.  Continue to work on reducing the amount of added sugars in your diet.  Mae Jones CNP     Received call from Patient.  Patient looking for lab results from 10/14/24.  Relayed above message from PIETER Jones.  Verbalized understanding.  Bahman Marshall RN

## 2024-11-08 NOTE — PATIENT INSTRUCTIONS
I would recommend trying a probiotic and you can also try Imodium (over the counter) to help with diarrhea.      Viral Gastroenteritis (Adult)    Gastroenteritis is commonly called the stomach flu. It is most often caused by a virus that affects the stomach and intestinal tract and usually lasts from 2 to 7 days. Common viruses causing gastroenteritis include norovirus, rotavirus, and hepatitis A. Non-viral causes of gastroenteritis include bacteria, parasites, and toxins.  The danger from repeated vomiting or diarrhea is dehydration. This is the loss of too much fluid from the body. When this occurs, body fluids must be replaced. Antibiotics do not help with this illness because it is usually viral.Simple home treatment will be helpful.  Symptoms of viral gastroenteritis may include:    Watery, loose stools    Stomach pain or abdominal cramps    Fever and chills    Nausea and vomiting    Loss of bowel control    Headache  Home care  Gastroenteritis is transmitted by contact with the stool or vomit of an infected person. This can occur from person to person or from contact with a contaminated surface.  Follow these guidelines when caring for yourself at home:    If symptoms are severe, rest at home for the next 24 hours or until you are feeling better.    Wash your hands with soap and water or use alcohol-based  to prevent the spread of infection. Wash your hands after touching anyone who is sick.    Wash your hands or use alcohol-based  after using the toilet and before meals. Clean the toilet after each use.  Remember these tips when preparing food:    People with diarrhea should not prepare or serve food to others. When preparing foods, wash your hands before and after.    Wash your hands after using cutting boards, countertops, knives, or utensils that have been in contact with raw food.    Keep uncooked meats away from cooked and ready-to-eat foods.  Medicine  You may use acetaminophen or  NSAID medicines like ibuprofen or naproxen to control fever unless another medicine was given. If you have chronic liver or kidney disease, talk with your healthcare provider before using these medicines. Also talk with your provider if you've had a stomach ulcer or gastrointestinal bleeding. Don't give aspirin to anyone under 18 years of age who is ill with a fever. It may cause severe liver damage. Don't use NSAIDS is you are already taking one for another condition (like arthritis) or are on aspirin (such as for heart disease or after a stroke).  If medicine for vomiting or diarrhea are prescribed, take these only as directed. Do not take over-the-counter medicines for vomiting or diarrhea unless instructed by your healthcare provider.  Diet  Follow these guidelines for food:    Water and liquids are important so you don't get dehydrated. Drink a small amount at a time or suck on ice chips if you are vomiting.    If you eat, avoid fatty, greasy, spicy, or fried foods.    Don't eat dairy if you have diarrhea. This can make diarrhea worse.    Avoid tobacco, alcohol, and caffeine which may worsen symptoms.  During the first 24 hours (the first full day), follow the diet below:    Beverages. Sports drinks, soft drinks without caffeine, ginger ale, mineral water (plain or flavored), decaffeinated tea and coffee. If you are very dehydrated, sports drinks aren't a good choice. They have too much sugar and not enough electrolytes. In this case, commercially available products called oral rehydration solutions, are best.    Soups. Eat clear broth, consommé, and bouillon.    Desserts. Eat gelatin, popsicles, and fruit juice bars.  During the next 24 hours (the second day), you may add the following to the above:    Hot cereal, plain toast, bread, rolls, and crackers    Plain noodles, rice, mashed potatoes, chicken noodle or rice soup    Unsweetened canned fruit (avoid pineapple), bananas    Limit fat intake to less than 15  grams per day. Do this by avoiding margarine, butter, oils, mayonnaise, sauces, gravies, fried foods, peanut butter, meat, poultry, and fish.    Limit fiber and avoid raw or cooked vegetables, fresh fruits (except bananas), and bran cereals.    Limit caffeine and chocolate. Don't use spices or seasonings other than salt.    Limit dairy products.    Avoid alcohol.  During the next 24 hours:    Gradually resume a normal diet as you feel better and your symptoms improve.    If at any time it starts getting worse again, go back to clear liquids until you feel better.  Follow-up care  Follow up with your healthcare provider, or as advised. Call your provider if you don't get better within 24 hours or if diarrhea lasts more than a week. Also follow up if you are unable to keep down liquids and get dehydrated. If a stool (diarrhea) sample was taken, call as directed for the results.  Call 911  Call 911 if any of these occur:    Trouble breathing    Chest pain    Confused    Severe drowsiness or trouble awakening    Fainting or loss of consciousness    Rapid heart rate    Seizure    Stiff neck  When to seek medical advice  Call your healthcare provider right away if any of these occur:    Abdominal pain that gets worse    Continued vomiting (unable to keep liquids down)    Frequent diarrhea (more than 5 times a day)    Blood in vomit or stool (black or red color)    Dark urine, reduced urine output, or extreme thirst    Weakness or dizziness    Drowsiness    Fever of 100.4 F (38 C) oral or higher that does not get better with fever medicine    New rash    3287-4312 [employer]. [address] [place] [address], [place] [address]. All rights reserved. This information is not intended as a substitute for professional medical care. Always follow your healthcare professional's instructions.         Bed/Stretcher in lowest position, wheels locked, appropriate side rails in place/Call bell, personal items and telephone in reach/Instruct patient to call for assistance before getting out of bed/chair/stretcher/Non-slip footwear applied when patient is off stretcher/[place] to call system/Physically safe environment - no spills, clutter or unnecessary equipment/Purposeful proactive rounding/Room/bathroom lighting operational, light cord in reach

## 2024-12-09 NOTE — PROGRESS NOTES
In person evaluation      HPI  12/10/2023, hospital consult Dr. Graf  3/12/2024, transfer of care,Clinic visit  12/10/2024, in person visit      69-year-old being evaluated neurologically for:  Right MCA infarct 12/9/2023  Right M2/M3 occlusion (status post thrombolytics 12/9/2023)  Left hand fine motor skill deficit from stroke      Since last seen March 2024  ED visit 3/26/2024 chronic diarrhea/dizziness  6/4/2024 seen by primary MD  10/14/2024 evaluated by ENT chronic nasal drainage    Patient has had some decreased hearing in his left ear chronically  Has had some trouble with lightheadedness worse when dehydrated  Has had some persistent nasal drip did see ENT    Appears from the chart he has been rescheduled with his evaluation with sleep medicine  Patient says sleep eval now is pushed out to February 2025 he should still get that done  In the distant past may have been diagnosed with sleep apnea    Does work as a / injection mold person    Does have some mild ataxia that is subtle and he should be careful  He has soft voice  Question whether he has Parkinson's disease symptomatology from his stroke or if he has a secondary degenerative process going on  Will keep an eye on this          Has chronic difficulty though with the dexterity of his left hand does fluctuate with fatigue  Some dysarthria which fluctuates also with fatigue  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  Right MCA infarct 12/9/2023         Right M2/M3 occlusion        Status post thrombolytics at the time of stroke        30-day outpatient Holter monitor recommended, 26+ day negative for atrial fibrillation.      B.  Vascular risk factors      Hypertension/alcohol use disorder/NILDA    C.  Return to work note written by primary       Return to work 2/5/2024, return daily, can take breaks or go home as needed if tired.      D.  Past history hyperreflexia       Worked up by Dr. Townsend orthopedic, some mild canal narrowing (C5-C6/T8-T9/L4-L5) not sufficient to explain symptoms per orthopedic       History of laminectomy for low back pain in the past status post steroid injections       Evaluated by Dr. Manuel Garcias 1/2/2019        Hospital course 12/9/2023  68-year-old presented to hospital 12/9/2023 with slurred speech/left-sided weakness found on floor.  Patient was dysarthric in the ER  Is within the window for thrombolytics which were used.  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  No acute territorial infarction.  HEAD CTA: 12/9/2023  1.  No large vessel occlusion is identified.        However, there is somewhat decreased vessel arborization in the region of the right frontal operculum, which may reflect occlusion of a distal M2/M3 branch.        MRI is recommended for further evaluation.  2.  Enhancement is present throughout the dural venous sinuses.        However, there is somewhat  reduced enhancement in the inferior aspect of the superior sagittal sinus compared to adjacent sinuses.        This is favored to be technique related as opposed to reflecting a nonocclusive thrombus.       CTV or MRV can be performed for further evaluation if clinically warranted.  NECK CTA: 12/9/2023  1.  No hemodynamically significant stenosis in the neck vessels.   2.  No evidence for dissection.  CT cervical spine 12/9/2023  1.  No fracture or posttraumatic subluxation.  2.  Degenerative changes, see official report.  MRV 12/9/2023  1.  No dural venous sinus thrombosis or significant stenosis.   MRI head 12/10/2023  1.  Acute right MCA territory infarct identified involving the right insula, right external capsule, right frontal lobe and right parietal lobe.        No hemorrhagic conversion or significant mass effect.  2.  Chronic senescent and presumed microvascular ischemic changes.  Echo 12/10/2023, ejection fraction 75%, mild left atrial enlargement  Holter monitor 12/12/2023 - 1/10/2024 (26+ days)  No atrial fibrillation no tachyarrhythmias    Laboratory data                         12/2023          10/2024  NA/K                137/4.3          139/3.8  BUN/Cr            12.3/1.14       11.2/1.02  GLU                 98                  104  AST                 53  WBC/HGB       10.7/10.1  PLTs                 455,000  HDL/LDL          42/66             48/56  B12                   292  HGBA1C          5.1  EtOH                0.15      Exam    Review of systems  Slurred speech worse when he is tired  No dysphagia though can eat okay  No visual changes  Some hearing loss in the left ear  Postnasal drip with drainage from his right nose nostril  No headache no chest pain no shortness of breath no nausea vomiting no diarrhea no fever chills    Continued dysmetria and clumsiness of the left hand is left-handed      General exam  Blood pressure 150/81, pulse 54 no lightheadedness  Alert orient x 3  HEENT  normal  Lungs clear  Abdomen soft  Symmetrical pulses  No edema the feet    Neurologic exam  Alert orient x 3  Normal prosody of speech  Normal naming  Normal comprehension  Normal repetition  No aphasia  No neglect  Memory recall okay    Cranials 2 through 12  No ophthalmoplegia  No nystagmus  Visual fields intact confrontation  Face symmetrical (plus minus a little decreased nasolabial fold on the left intermittently)  Tongue twisters slightly thick but understandable    Upper extremities  Right arm normal  Left arm distal greater than proximal weakness clumsy decreased rapid alternating movements  Left arm drift weakness is worse distally than proximal      Lower extremities  No significant weakness      DTRs  Brisk bilaterally (chronic)  Left toe equivocal upgoing    Gait  Normal (slightly hesitant)  Decreased arm swing      Neurodegenerative changes  Dysarthric speech/hypophonia  Decreased blink  Positive glabellar  Decreased arm swing on the left greater than the right  Mild difficulty with balance    Question secondary to stroke versus other underlying neurodegenerative change    Assessment/plan      Right MCA infarct 12/9/2023        Involved right insula/external capsule/frontal lobe/parietal lobe        Status post thrombolytics 12/9/2023    2.   Right M1 2/M3 occlusion per CTA at time of infarct        Holter monitor negative for A-fib    3.   Vascular risk factors        Hypertension/alcohol use disorder/NILDA currently untreated      Diagnosis  Right CVA  Obstructive sleep apnea untreated  Alcohol use disorder  Parkinson type symptomatology    Recommend  Risk factor reduction per primary MD  Aspirin 81 mg once per day  Crestor 10 mg once per day    Patient will see sleep specialist in February 2025 should go by their recommendations  Continue medications the same  We will watch his exam for the development of any parkinsonian features whether these are more from stroke or there is a secondary  "neurodegenerative component    Still having 2 beers \"once in a while\" difficult to quantitate  Would be better if he cut out completely    Discussed the above with patient and daughter  Will follow-up in August 2025      Total care time today 30 minutes  Multiple issues as above discussed  The longitudinal plan of care for the diagnosis(es)/condition(s) as documented were addressed during this visit. Due to the added complexity in care, I will continue to support Donnie in the subsequent management and with ongoing continuity of care.      As part of visit today  Reviewed ED note 3/26/2024  6/4/2024 seen by primary MD  10/14/2024 evaluated by ENT chronic nasal drainage  Reviewed laboratory data  Reviewed messages from sleep medicine I did reschedule visit        "

## 2024-12-10 ENCOUNTER — OFFICE VISIT (OUTPATIENT)
Dept: NEUROLOGY | Facility: CLINIC | Age: 69
End: 2024-12-10
Payer: COMMERCIAL

## 2024-12-10 VITALS
HEART RATE: 54 BPM | SYSTOLIC BLOOD PRESSURE: 150 MMHG | DIASTOLIC BLOOD PRESSURE: 81 MMHG | WEIGHT: 166 LBS | BODY MASS INDEX: 26.68 KG/M2 | HEIGHT: 66 IN

## 2024-12-10 DIAGNOSIS — I63.311 CEREBROVASCULAR ACCIDENT (CVA) DUE TO THROMBOSIS OF RIGHT MIDDLE CEREBRAL ARTERY (H): Primary | ICD-10-CM

## 2024-12-10 DIAGNOSIS — G47.33 OSA (OBSTRUCTIVE SLEEP APNEA): ICD-10-CM

## 2024-12-10 PROCEDURE — 99214 OFFICE O/P EST MOD 30 MIN: CPT | Performed by: PSYCHIATRY & NEUROLOGY

## 2024-12-10 PROCEDURE — G2211 COMPLEX E/M VISIT ADD ON: HCPCS | Performed by: PSYCHIATRY & NEUROLOGY

## 2024-12-10 NOTE — LETTER
12/10/2024      Donnie Ernandez  [address] [place] [address]      Dear Colleague,    Thank you for referring your patient, Donnie Ernandez, to the [facility] [place]. Please see a copy of my visit note below.    In person evaluation      HPI  12/10/2023, hospital consult Dr. Graf  3/12/2024, transfer of care,Clinic visit  12/10/2024, in person visit      69-year-old being evaluated neurologically for:  Right MCA infarct 12/9/2023  Right M2/M3 occlusion (status post thrombolytics 12/9/2023)  Left hand fine motor skill deficit from stroke      Since last seen March 2024  ED visit 3/26/2024 chronic diarrhea/dizziness  6/4/2024 seen by primary MD  10/14/2024 evaluated by ENT chronic nasal drainage    Patient has had some decreased hearing in his left ear chronically  Has had some trouble with lightheadedness worse when dehydrated  Has had some persistent nasal drip did see ENT    Appears from the chart he has been rescheduled with his evaluation with sleep medicine  Patient says sleep eval now is pushed out to February 2025 he should still get that done  In the distant past may have been diagnosed with sleep apnea    Does work as a / injection mold person    Does have some mild ataxia that is subtle and he should be careful  He has soft voice  Question whether he has Parkinson's disease symptomatology from his stroke or if he has a secondary degenerative process going on  Will keep an eye on this          Has chronic difficulty though with the dexterity of his left hand does fluctuate with fatigue  Some dysarthria which fluctuates also with fatigue  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  Right MCA infarct 12/9/2023        Right M2/M3 occlusion        Status post thrombolytics at the time of stroke        30-day outpatient Holter monitor recommended, 26+ day negative for atrial fibrillation.      B.  Vascular risk factors      Hypertension/alcohol use disorder/NILDA    C.  Return to work note written by primary       Return to work 2/5/2024, return daily, can take breaks or go home as needed if tired.      D.  Past history hyperreflexia       Worked up by Dr. Townsend orthopedic, some mild canal narrowing (C5-C6/T8-T9/L4-L5) not sufficient to explain symptoms per orthopedic       History of laminectomy for low back pain in the past status post steroid injections       Evaluated by Dr. Manuel Garcias 1/2/2019        Hospital course 12/9/2023  68-year-old presented to hospital 12/9/2023 with slurred speech/left-sided weakness found on floor.  Patient was dysarthric in the ER  Is within the window for thrombolytics which were used.  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  No acute territorial infarction.  HEAD CTA: 12/9/2023  1.  No large vessel occlusion is identified.        However, there is somewhat decreased vessel arborization  in the region of the right frontal operculum, which may reflect occlusion of a distal M2/M3 branch.        MRI is recommended for further evaluation.  2.  Enhancement is present throughout the dural venous sinuses.        However, there is somewhat reduced enhancement in the inferior aspect of the superior sagittal sinus compared to adjacent sinuses.        This is favored to be technique related as opposed to reflecting a nonocclusive thrombus.       CTV or MRV can be performed for further evaluation if clinically warranted.  NECK CTA: 12/9/2023  1.  No hemodynamically significant stenosis in the neck vessels.   2.  No evidence for dissection.  CT cervical spine 12/9/2023  1.  No fracture or posttraumatic subluxation.  2.  Degenerative changes, see official report.  MRV 12/9/2023  1.  No dural venous sinus thrombosis or significant stenosis.   MRI head 12/10/2023  1.  Acute right MCA territory infarct identified involving the right insula, right external capsule, right frontal lobe and right parietal lobe.        No hemorrhagic conversion or significant mass effect.  2.  Chronic senescent and presumed microvascular ischemic changes.  Echo 12/10/2023, ejection fraction 75%, mild left atrial enlargement  Holter monitor 12/12/2023 - 1/10/2024 (26+ days)  No atrial fibrillation no tachyarrhythmias    Laboratory data                         12/2023          10/2024  NA/K                137/4.3          139/3.8  BUN/Cr            12.3/1.14       11.2/1.02  GLU                 98                  104  AST                 53  WBC/HGB       10.7/10.1  PLTs                 455,000  HDL/LDL          42/66             48/56  B12                   292  HGBA1C          5.1  EtOH                0.15      Exam    Review of systems  Slurred speech worse when he is tired  No dysphagia though can eat okay  No visual changes  Some hearing loss in the left ear  Postnasal drip with drainage from his right nose nostril  No headache no  chest pain no shortness of breath no nausea vomiting no diarrhea no fever chills    Continued dysmetria and clumsiness of the left hand is left-handed      General exam  Blood pressure 150/81, pulse 54 no lightheadedness  Alert orient x 3  HEENT normal  Lungs clear  Abdomen soft  Symmetrical pulses  No edema the feet    Neurologic exam  Alert orient x 3  Normal prosody of speech  Normal naming  Normal comprehension  Normal repetition  No aphasia  No neglect  Memory recall okay    Cranials 2 through 12  No ophthalmoplegia  No nystagmus  Visual fields intact confrontation  Face symmetrical (plus minus a little decreased nasolabial fold on the left intermittently)  Tongue twisters slightly thick but understandable    Upper extremities  Right arm normal  Left arm distal greater than proximal weakness clumsy decreased rapid alternating movements  Left arm drift weakness is worse distally than proximal      Lower extremities  No significant weakness      DTRs  Brisk bilaterally (chronic)  Left toe equivocal upgoing    Gait  Normal (slightly hesitant)  Decreased arm swing      Neurodegenerative changes  Dysarthric speech/hypophonia  Decreased blink  Positive glabellar  Decreased arm swing on the left greater than the right  Mild difficulty with balance    Question secondary to stroke versus other underlying neurodegenerative change    Assessment/plan      Right MCA infarct 12/9/2023        Involved right insula/external capsule/frontal lobe/parietal lobe        Status post thrombolytics 12/9/2023    2.   Right M1 2/M3 occlusion per CTA at time of infarct        Holter monitor negative for A-fib    3.   Vascular risk factors        Hypertension/alcohol use disorder/NILDA currently untreated      Diagnosis  Right CVA  Obstructive sleep apnea untreated  Alcohol use disorder  Parkinson type symptomatology    Recommend  Risk factor reduction per primary MD  Aspirin 81 mg once per day  Crestor 10 mg once per day    Patient will  "see sleep specialist in February 2025 should go by their recommendations  Continue medications the same  We will watch his exam for the development of any parkinsonian features whether these are more from stroke or there is a secondary neurodegenerative component    Still having 2 beers \"once in a while\" difficult to quantitate  Would be better if he cut out completely    Discussed the above with patient and daughter  Will follow-up in August 2025      Total care time today 30 minutes  Multiple issues as above discussed  The longitudinal plan of care for the diagnosis(es)/condition(s) as documented were addressed during this visit. Due to the added complexity in care, I will continue to support Donnie in the subsequent management and with ongoing continuity of care.      As part of visit today  Reviewed ED note 3/26/2024  6/4/2024 seen by primary MD  10/14/2024 evaluated by ENT chronic nasal drainage  Reviewed laboratory data  Reviewed messages from sleep medicine I did reschedule visit          Again, thank you for allowing me to participate in the care of your patient.        Sincerely,        kaykay Nieves MD  "

## 2024-12-10 NOTE — NURSING NOTE
Chief Complaint   Patient presents with    CVA     9 month follow up for CVA 12/9/23. Pt states he is doing well. His sleep evaluation got canceled by the sleep clinic and rescheduled to Jenny Saravia LPN on 12/10/2024 at 9:37 AM

## 2024-12-30 ENCOUNTER — APPOINTMENT (OUTPATIENT)
Dept: GENERAL RADIOLOGY | Facility: CLINIC | Age: 69
End: 2024-12-30
Attending: FAMILY MEDICINE
Payer: COMMERCIAL

## 2024-12-30 ENCOUNTER — HOSPITAL ENCOUNTER (EMERGENCY)
Facility: CLINIC | Age: 69
Discharge: HOME | End: 2024-12-30
Attending: FAMILY MEDICINE | Admitting: FAMILY MEDICINE
Payer: COMMERCIAL

## 2024-12-30 VITALS
HEART RATE: 100 BPM | SYSTOLIC BLOOD PRESSURE: 147 MMHG | WEIGHT: 150 LBS | DIASTOLIC BLOOD PRESSURE: 76 MMHG | RESPIRATION RATE: 20 BRPM | OXYGEN SATURATION: 94 % | TEMPERATURE: 98.7 F | HEIGHT: 66 IN | BODY MASS INDEX: 24.11 KG/M2

## 2024-12-30 DIAGNOSIS — R04.0 EPISTAXIS: ICD-10-CM

## 2024-12-30 DIAGNOSIS — J10.1 INFLUENZA A: ICD-10-CM

## 2024-12-30 LAB
FLUAV RNA SPEC QL NAA+PROBE: POSITIVE
FLUBV RNA RESP QL NAA+PROBE: NEGATIVE
RSV RNA SPEC NAA+PROBE: NEGATIVE
SARS-COV-2 RNA RESP QL NAA+PROBE: NEGATIVE

## 2024-12-30 PROCEDURE — 99284 EMERGENCY DEPT VISIT MOD MDM: CPT | Mod: 25 | Performed by: FAMILY MEDICINE

## 2024-12-30 PROCEDURE — 71046 X-RAY EXAM CHEST 2 VIEWS: CPT

## 2024-12-30 PROCEDURE — 87637 SARSCOV2&INF A&B&RSV AMP PRB: CPT | Performed by: FAMILY MEDICINE

## 2024-12-30 PROCEDURE — 99283 EMERGENCY DEPT VISIT LOW MDM: CPT | Performed by: FAMILY MEDICINE

## 2024-12-30 ASSESSMENT — COLUMBIA-SUICIDE SEVERITY RATING SCALE - C-SSRS
6. HAVE YOU EVER DONE ANYTHING, STARTED TO DO ANYTHING, OR PREPARED TO DO ANYTHING TO END YOUR LIFE?: NO
1. IN THE PAST MONTH, HAVE YOU WISHED YOU WERE DEAD OR WISHED YOU COULD GO TO SLEEP AND NOT WAKE UP?: NO
2. HAVE YOU ACTUALLY HAD ANY THOUGHTS OF KILLING YOURSELF IN THE PAST MONTH?: NO

## 2024-12-30 ASSESSMENT — ACTIVITIES OF DAILY LIVING (ADL)
ADLS_ACUITY_SCORE: 58
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
## 2024-12-30 NOTE — Clinical Note
Donnie Ernandez was seen and treated in our emergency department on 12/30/2024.  He may return to work on 01/04/2025.       If you have any questions or concerns, please don't hesitate to call.      Edgar Ardon MD

## 2024-12-30 NOTE — ED TRIAGE NOTES
Patient c/o cough for 3 weeks with SOA       Triage Assessment (Adult)       Row Name 12/30/24 0644          Triage Assessment    Airway WDL WDL        Respiratory WDL    Respiratory WDL cough        Cardiac WDL    Cardiac WDL WDL

## 2024-12-30 NOTE — DISCHARGE INSTRUCTIONS
ICD-10-CM    1. Influenza A  J10.1     due to prolonged symptoms will not use tamiflu.  stay hydrated. return for shortness of breath., wporsening. note for work.  return for worsening.       2. Epistaxis  R04.0     use vaseline or surgilube along the septum of the nares.  may use afrin for 3 days only to prevent further bleeding. this was incidental here and resolved spontaneouslyt without intervenytion here

## 2025-01-09 ENCOUNTER — OFFICE VISIT (OUTPATIENT)
Dept: FAMILY MEDICINE | Facility: CLINIC | Age: 70
End: 2025-01-09
Payer: COMMERCIAL

## 2025-01-09 ENCOUNTER — ANCILLARY PROCEDURE (OUTPATIENT)
Dept: GENERAL RADIOLOGY | Facility: CLINIC | Age: 70
End: 2025-01-09
Attending: NURSE PRACTITIONER
Payer: COMMERCIAL

## 2025-01-09 VITALS
HEIGHT: 65 IN | OXYGEN SATURATION: 98 % | TEMPERATURE: 99.6 F | HEART RATE: 62 BPM | WEIGHT: 156 LBS | BODY MASS INDEX: 25.99 KG/M2 | RESPIRATION RATE: 20 BRPM | DIASTOLIC BLOOD PRESSURE: 56 MMHG | SYSTOLIC BLOOD PRESSURE: 100 MMHG

## 2025-01-09 DIAGNOSIS — J10.1 INFLUENZA A: Primary | ICD-10-CM

## 2025-01-09 DIAGNOSIS — R06.2 WHEEZING: ICD-10-CM

## 2025-01-09 DIAGNOSIS — J10.1 INFLUENZA A: ICD-10-CM

## 2025-01-09 RX ORDER — ALBUTEROL SULFATE 90 UG/1
2 INHALANT RESPIRATORY (INHALATION) EVERY 4 HOURS PRN
Qty: 18 G | Refills: 1 | Status: SHIPPED | OUTPATIENT
Start: 2025-01-09

## 2025-01-09 RX ORDER — METHYLPREDNISOLONE 4 MG/1
TABLET ORAL
Qty: 21 TABLET | Refills: 0 | Status: SHIPPED | OUTPATIENT
Start: 2025-01-09

## 2025-01-09 ASSESSMENT — PATIENT HEALTH QUESTIONNAIRE - PHQ9
SUM OF ALL RESPONSES TO PHQ QUESTIONS 1-9: 8
SUM OF ALL RESPONSES TO PHQ QUESTIONS 1-9: 8
10. IF YOU CHECKED OFF ANY PROBLEMS, HOW DIFFICULT HAVE THESE PROBLEMS MADE IT FOR YOU TO DO YOUR WORK, TAKE CARE OF THINGS AT HOME, OR GET ALONG WITH OTHER PEOPLE: SOMEWHAT DIFFICULT

## 2025-01-09 ASSESSMENT — PAIN SCALES - GENERAL: PAINLEVEL_OUTOF10: NO PAIN (0)

## 2025-01-09 NOTE — PROGRESS NOTES
Assessment & Plan     Influenza A  Ongoing cough and wheezing after influenza infection.  No pneumonia on CXR.  Recommend treating symptoms with steroids and albuterol (spacer given).  Contact me in 2 days if no improvement, sooner if worsens.  - XR Chest 2 Views; Future  - methylPREDNISolone (MEDROL DOSEPAK) 4 MG tablet therapy pack; Follow Package Directions  - albuterol (PROAIR HFA/PROVENTIL HFA/VENTOLIN HFA) 108 (90 Base) MCG/ACT inhaler; Inhale 2 puffs into the lungs every 4 hours as needed for shortness of breath, wheezing or cough.    Wheezing  - methylPREDNISolone (MEDROL DOSEPAK) 4 MG tablet therapy pack; Follow Package Directions  - albuterol (PROAIR HFA/PROVENTIL HFA/VENTOLIN HFA) 108 (90 Base) MCG/ACT inhaler; Inhale 2 puffs into the lungs every 4 hours as needed for shortness of breath, wheezing or cough.    The risks, benefits and treatment options of prescribed medications or other treatments have been discussed with the patient. The patient verbalized their understanding and should call or follow up if no improvement or if they develop further problems.  Mae Joens, [place]                  Subjective   Donnie is a 69 year old, presenting for the following health issues:  Wheezing (Influenza follow up)        1/9/2025    10:49 AM   Additional Questions   Roomed by Preeti AMBROSIO CMA   Accompanied by daughter Uziel         1/9/2025    10:49 AM   Patient Reported Additional Medications   Patient reports taking the following new medications none     History of Present Illness       Reason for visit:  Ongoing cough since having influenza 2 weeks ago   He is taking medications regularly.         Concern - wheezing and coughing  Onset: 2 weeks - on [place]  Description: influenza A diagnosed 12/30/24  Intensity: moderate  Progression of Symptoms:  better today  Accompanying Signs & Symptoms: cough and wheezing  Getting very winded easily with walking  Tightness in chest  Cough is dry  Slept good last night -  "best in 2 weeks.  Previous history of similar problem: no  Precipitating factors:        Worsened by: influenza  Alleviating factors:        Improved by: nothing  Therapies tried and outcome: tylenol  Too late for anti viral  Taking tesslon from wife's Rx- helps a little        Review of Systems  Constitutional, HEENT, cardiovascular, pulmonary, gi and gu systems are negative, except as otherwise noted.      Objective    /56 (BP Location: Right arm, Patient Position: Sitting, Cuff Size: Adult Regular)   Pulse 62   Temp 99.6  F (37.6  C) (Tympanic)   Resp 20   Ht 1.638 m (5' 4.5\")   Wt 70.8 kg (156 lb)   SpO2 98%   BMI 26.36 kg/m    Body mass index is 26.36 kg/m .  Physical Exam   GENERAL: alert and no distress  HENT: ear canals and TM's normal, nose and mouth without ulcers or lesions  NECK: no adenopathy, no asymmetry, masses, or scars  RESP: lungs clear to auscultation - no rales, rhonchi or wheezes  CV: regular rate and rhythm, normal S1 S2, no S3 or S4, no murmur, click or rub, no peripheral edema    Xray - Reviewed and interpreted by me.  Negative for pneumonia        Signed Electronically by: MAYANK Lutz CNP    "

## 2025-01-09 NOTE — LETTER
2025    RE: Donnie Ernandez   1955        To Whom it May Concern;      Please excuse Donnie Ernandez from work for an illness. He may return to work without restriction on 2025.      Sincerely,        MAYANK Lutz CNP  
detailed exam

## 2025-01-25 ENCOUNTER — HOSPITAL ENCOUNTER (EMERGENCY)
Facility: CLINIC | Age: 70
Discharge: OTHER INSTITUTION | End: 2025-01-25
Attending: FAMILY MEDICINE | Admitting: FAMILY MEDICINE
Payer: COMMERCIAL

## 2025-01-25 ENCOUNTER — HOSPITAL ENCOUNTER (INPATIENT)
Facility: CLINIC | Age: 70
LOS: 2 days | Discharge: HOME | DRG: 368 | End: 2025-01-27
Attending: STUDENT IN AN ORGANIZED HEALTH CARE EDUCATION/TRAINING PROGRAM | Admitting: INTERNAL MEDICINE
Payer: COMMERCIAL

## 2025-01-25 ENCOUNTER — NURSE TRIAGE (OUTPATIENT)
Dept: NURSING | Facility: CLINIC | Age: 70
End: 2025-01-25
Payer: COMMERCIAL

## 2025-01-25 VITALS
HEART RATE: 79 BPM | RESPIRATION RATE: 19 BRPM | SYSTOLIC BLOOD PRESSURE: 113 MMHG | TEMPERATURE: 98.3 F | DIASTOLIC BLOOD PRESSURE: 55 MMHG | OXYGEN SATURATION: 99 %

## 2025-01-25 DIAGNOSIS — K92.2 UPPER GI BLEED: ICD-10-CM

## 2025-01-25 DIAGNOSIS — K29.01 GASTROINTESTINAL HEMORRHAGE ASSOCIATED WITH ACUTE GASTRITIS: ICD-10-CM

## 2025-01-25 DIAGNOSIS — D62 ANEMIA DUE TO BLOOD LOSS, ACUTE: ICD-10-CM

## 2025-01-25 DIAGNOSIS — Z86.73 HISTORY OF STROKE: Primary | ICD-10-CM

## 2025-01-25 PROBLEM — I69.959: Status: ACTIVE | Noted: 2024-01-26

## 2025-01-25 PROBLEM — F33.0 MDD (MAJOR DEPRESSIVE DISORDER), RECURRENT EPISODE, MILD: Status: ACTIVE | Noted: 2023-12-09

## 2025-01-25 PROBLEM — D36.9 TUBULAR ADENOMA: Status: ACTIVE | Noted: 2017-10-03

## 2025-01-25 PROBLEM — K82.4 GALLBLADDER POLYP: Status: ACTIVE | Noted: 2022-03-16

## 2025-01-25 PROBLEM — Z90.81 H/O SPLENECTOMY: Status: ACTIVE | Noted: 2025-01-25

## 2025-01-25 PROBLEM — N18.30 CHRONIC KIDNEY DISEASE, STAGE 3 (H): Status: ACTIVE | Noted: 2021-10-08

## 2025-01-25 LAB
ABO + RH BLD: NORMAL
ALBUMIN SERPL BCG-MCNC: 3.3 G/DL (ref 3.5–5.2)
ALP SERPL-CCNC: 70 U/L (ref 40–150)
ALT SERPL W P-5'-P-CCNC: 16 U/L (ref 0–70)
ANION GAP SERPL CALCULATED.3IONS-SCNC: 10 MMOL/L (ref 7–15)
AST SERPL W P-5'-P-CCNC: 12 U/L (ref 0–45)
BASOPHILS # BLD AUTO: 0.1 10E3/UL (ref 0–0.2)
BASOPHILS NFR BLD AUTO: 0 %
BILIRUB SERPL-MCNC: 0.4 MG/DL
BLD GP AB SCN SERPL QL: NEGATIVE
BLD PROD TYP BPU: NORMAL
BLOOD COMPONENT TYPE: NORMAL
BUN SERPL-MCNC: 29.8 MG/DL (ref 8–23)
CALCIUM SERPL-MCNC: 8.5 MG/DL (ref 8.8–10.4)
CHLORIDE SERPL-SCNC: 107 MMOL/L (ref 98–107)
CODING SYSTEM: NORMAL
CREAT SERPL-MCNC: 0.96 MG/DL (ref 0.67–1.17)
CROSSMATCH: NORMAL
EGFRCR SERPLBLD CKD-EPI 2021: 86 ML/MIN/1.73M2
EOSINOPHIL # BLD AUTO: 0.2 10E3/UL (ref 0–0.7)
EOSINOPHIL NFR BLD AUTO: 1 %
ERYTHROCYTE [DISTWIDTH] IN BLOOD BY AUTOMATED COUNT: 14.6 % (ref 10–15)
ERYTHROCYTE [DISTWIDTH] IN BLOOD BY AUTOMATED COUNT: 16.1 % (ref 10–15)
GLUCOSE SERPL-MCNC: 99 MG/DL (ref 70–99)
HCO3 SERPL-SCNC: 21 MMOL/L (ref 22–29)
HCT VFR BLD AUTO: 24.6 % (ref 40–53)
HCT VFR BLD AUTO: 28.1 % (ref 40–53)
HCT VFR BLD AUTO: 28.1 % (ref 40–53)
HEMOCCULT STL QL: POSITIVE
HGB BLD-MCNC: 7.8 G/DL (ref 13.3–17.7)
HGB BLD-MCNC: 8.1 G/DL (ref 13.3–17.7)
HGB BLD-MCNC: 9.2 G/DL (ref 13.3–17.7)
IMM GRANULOCYTES # BLD: 0.1 10E3/UL
IMM GRANULOCYTES NFR BLD: 0 %
IRON BINDING CAPACITY (ROCHE): 266 UG/DL (ref 240–430)
IRON SATN MFR SERPL: 50 % (ref 15–46)
IRON SERPL-MCNC: 133 UG/DL (ref 61–157)
ISSUE DATE AND TIME: NORMAL
LIPASE SERPL-CCNC: 31 U/L (ref 13–60)
LYMPHOCYTES # BLD AUTO: 3.4 10E3/UL (ref 0.8–5.3)
LYMPHOCYTES NFR BLD AUTO: 24 %
MCH RBC QN AUTO: 31 PG (ref 26.5–33)
MCH RBC QN AUTO: 32.1 PG (ref 26.5–33)
MCHC RBC AUTO-ENTMCNC: 32.7 G/DL (ref 31.5–36.5)
MCHC RBC AUTO-ENTMCNC: 32.9 G/DL (ref 31.5–36.5)
MCV RBC AUTO: 95 FL (ref 78–100)
MCV RBC AUTO: 98 FL (ref 78–100)
MONOCYTES # BLD AUTO: 1.5 10E3/UL (ref 0–1.3)
MONOCYTES NFR BLD AUTO: 10 %
NEUTROPHILS # BLD AUTO: 9 10E3/UL (ref 1.6–8.3)
NEUTROPHILS NFR BLD AUTO: 63 %
NRBC # BLD AUTO: 0 10E3/UL
NRBC BLD AUTO-RTO: 0 /100
PLATELET # BLD AUTO: 354 10E3/UL (ref 150–450)
PLATELET # BLD AUTO: 372 10E3/UL (ref 150–450)
POTASSIUM SERPL-SCNC: 4.5 MMOL/L (ref 3.4–5.3)
PROT SERPL-MCNC: 5.8 G/DL (ref 6.4–8.3)
RBC # BLD AUTO: 2.52 10E6/UL (ref 4.4–5.9)
RBC # BLD AUTO: 2.97 10E6/UL (ref 4.4–5.9)
SODIUM SERPL-SCNC: 138 MMOL/L (ref 135–145)
SPECIMEN EXP DATE BLD: NORMAL
TSH SERPL DL<=0.005 MIU/L-ACNC: 3.13 UIU/ML (ref 0.3–4.2)
UNIT ABO/RH: NORMAL
UNIT NUMBER: NORMAL
UNIT STATUS: NORMAL
UNIT TYPE ISBT: 6200
WBC # BLD AUTO: 13 10E3/UL (ref 4–11)
WBC # BLD AUTO: 14.2 10E3/UL (ref 4–11)

## 2025-01-25 PROCEDURE — 85014 HEMATOCRIT: CPT | Performed by: FAMILY MEDICINE

## 2025-01-25 PROCEDURE — 86850 RBC ANTIBODY SCREEN: CPT | Performed by: FAMILY MEDICINE

## 2025-01-25 PROCEDURE — 80053 COMPREHEN METABOLIC PANEL: CPT | Performed by: FAMILY MEDICINE

## 2025-01-25 PROCEDURE — 82747 ASSAY OF FOLIC ACID RBC: CPT | Performed by: INTERNAL MEDICINE

## 2025-01-25 PROCEDURE — 82728 ASSAY OF FERRITIN: CPT | Performed by: INTERNAL MEDICINE

## 2025-01-25 PROCEDURE — 82272 OCCULT BLD FECES 1-3 TESTS: CPT | Performed by: FAMILY MEDICINE

## 2025-01-25 PROCEDURE — 96374 THER/PROPH/DIAG INJ IV PUSH: CPT | Performed by: FAMILY MEDICINE

## 2025-01-25 PROCEDURE — 83540 ASSAY OF IRON: CPT | Performed by: INTERNAL MEDICINE

## 2025-01-25 PROCEDURE — 83690 ASSAY OF LIPASE: CPT | Performed by: FAMILY MEDICINE

## 2025-01-25 PROCEDURE — 85004 AUTOMATED DIFF WBC COUNT: CPT | Performed by: FAMILY MEDICINE

## 2025-01-25 PROCEDURE — 84443 ASSAY THYROID STIM HORMONE: CPT | Performed by: INTERNAL MEDICINE

## 2025-01-25 PROCEDURE — 85041 AUTOMATED RBC COUNT: CPT | Performed by: INTERNAL MEDICINE

## 2025-01-25 PROCEDURE — 85048 AUTOMATED LEUKOCYTE COUNT: CPT | Performed by: FAMILY MEDICINE

## 2025-01-25 PROCEDURE — 120N000001 HC R&B MED SURG/OB

## 2025-01-25 PROCEDURE — 36415 COLL VENOUS BLD VENIPUNCTURE: CPT | Performed by: FAMILY MEDICINE

## 2025-01-25 PROCEDURE — 99223 1ST HOSP IP/OBS HIGH 75: CPT | Performed by: INTERNAL MEDICINE

## 2025-01-25 PROCEDURE — 36430 TRANSFUSION BLD/BLD COMPNT: CPT | Performed by: FAMILY MEDICINE

## 2025-01-25 PROCEDURE — 250N000013 HC RX MED GY IP 250 OP 250 PS 637: Performed by: INTERNAL MEDICINE

## 2025-01-25 PROCEDURE — 82607 VITAMIN B-12: CPT | Performed by: INTERNAL MEDICINE

## 2025-01-25 PROCEDURE — 86923 COMPATIBILITY TEST ELECTRIC: CPT | Performed by: FAMILY MEDICINE

## 2025-01-25 PROCEDURE — P9016 RBC LEUKOCYTES REDUCED: HCPCS | Performed by: FAMILY MEDICINE

## 2025-01-25 PROCEDURE — 258N000003 HC RX IP 258 OP 636: Performed by: INTERNAL MEDICINE

## 2025-01-25 PROCEDURE — 250N000009 HC RX 250: Performed by: INTERNAL MEDICINE

## 2025-01-25 PROCEDURE — 99285 EMERGENCY DEPT VISIT HI MDM: CPT | Mod: 25 | Performed by: FAMILY MEDICINE

## 2025-01-25 PROCEDURE — 250N000009 HC RX 250: Performed by: FAMILY MEDICINE

## 2025-01-25 PROCEDURE — 86900 BLOOD TYPING SEROLOGIC ABO: CPT | Performed by: FAMILY MEDICINE

## 2025-01-25 PROCEDURE — 85014 HEMATOCRIT: CPT | Performed by: INTERNAL MEDICINE

## 2025-01-25 PROCEDURE — 99285 EMERGENCY DEPT VISIT HI MDM: CPT | Performed by: FAMILY MEDICINE

## 2025-01-25 PROCEDURE — 36415 COLL VENOUS BLD VENIPUNCTURE: CPT | Performed by: INTERNAL MEDICINE

## 2025-01-25 PROCEDURE — 85018 HEMOGLOBIN: CPT | Performed by: FAMILY MEDICINE

## 2025-01-25 PROCEDURE — 83550 IRON BINDING TEST: CPT | Performed by: INTERNAL MEDICINE

## 2025-01-25 RX ORDER — ONDANSETRON 4 MG/1
4 TABLET, ORALLY DISINTEGRATING ORAL EVERY 6 HOURS PRN
Status: DISCONTINUED | OUTPATIENT
Start: 2025-01-25 | End: 2025-01-27 | Stop reason: HOSPADM

## 2025-01-25 RX ORDER — AMOXICILLIN 250 MG
1 CAPSULE ORAL 2 TIMES DAILY PRN
Status: DISCONTINUED | OUTPATIENT
Start: 2025-01-25 | End: 2025-01-27 | Stop reason: HOSPADM

## 2025-01-25 RX ORDER — SODIUM CHLORIDE, SODIUM LACTATE, POTASSIUM CHLORIDE, CALCIUM CHLORIDE 600; 310; 30; 20 MG/100ML; MG/100ML; MG/100ML; MG/100ML
INJECTION, SOLUTION INTRAVENOUS CONTINUOUS
Status: DISCONTINUED | OUTPATIENT
Start: 2025-01-25 | End: 2025-01-26

## 2025-01-25 RX ORDER — CALCIUM CARBONATE 500 MG/1
1000 TABLET, CHEWABLE ORAL 4 TIMES DAILY PRN
Status: DISCONTINUED | OUTPATIENT
Start: 2025-01-25 | End: 2025-01-27 | Stop reason: HOSPADM

## 2025-01-25 RX ORDER — LIDOCAINE 40 MG/G
CREAM TOPICAL
Status: DISCONTINUED | OUTPATIENT
Start: 2025-01-25 | End: 2025-01-27 | Stop reason: HOSPADM

## 2025-01-25 RX ORDER — AMOXICILLIN 250 MG
2 CAPSULE ORAL 2 TIMES DAILY PRN
Status: DISCONTINUED | OUTPATIENT
Start: 2025-01-25 | End: 2025-01-27 | Stop reason: HOSPADM

## 2025-01-25 RX ORDER — PROCHLORPERAZINE MALEATE 5 MG/1
5 TABLET ORAL EVERY 6 HOURS PRN
Status: DISCONTINUED | OUTPATIENT
Start: 2025-01-25 | End: 2025-01-27 | Stop reason: HOSPADM

## 2025-01-25 RX ORDER — ONDANSETRON 2 MG/ML
4 INJECTION INTRAMUSCULAR; INTRAVENOUS EVERY 6 HOURS PRN
Status: DISCONTINUED | OUTPATIENT
Start: 2025-01-25 | End: 2025-01-27 | Stop reason: HOSPADM

## 2025-01-25 RX ADMIN — PANTOPRAZOLE SODIUM 40 MG: 40 INJECTION, POWDER, FOR SOLUTION INTRAVENOUS at 20:19

## 2025-01-25 RX ADMIN — SODIUM CHLORIDE, POTASSIUM CHLORIDE, SODIUM LACTATE AND CALCIUM CHLORIDE: 600; 310; 30; 20 INJECTION, SOLUTION INTRAVENOUS at 19:01

## 2025-01-25 RX ADMIN — PANTOPRAZOLE SODIUM 40 MG: 40 INJECTION, POWDER, FOR SOLUTION INTRAVENOUS at 13:09

## 2025-01-25 RX ADMIN — THIAMINE HCL TAB 100 MG 100 MG: 100 TAB at 20:19

## 2025-01-25 ASSESSMENT — ACTIVITIES OF DAILY LIVING (ADL)
ADLS_ACUITY_SCORE: 58
ADLS_ACUITY_SCORE: 36
ADLS_ACUITY_SCORE: 58
ADLS_ACUITY_SCORE: 36
ADLS_ACUITY_SCORE: 58
ADLS_ACUITY_SCORE: 34
ADLS_ACUITY_SCORE: 34
ADLS_ACUITY_SCORE: 58
ADLS_ACUITY_SCORE: 58
ADLS_ACUITY_SCORE: 33
ADLS_ACUITY_SCORE: 58

## 2025-01-25 ASSESSMENT — ENCOUNTER SYMPTOMS
DYSURIA: 0
WHEEZING: 0
COUGH: 0
FEVER: 0
ABDOMINAL PAIN: 1
SORE THROAT: 0
DIARRHEA: 1
SINUS PRESSURE: 0
PALPITATIONS: 0
FREQUENCY: 0
SHORTNESS OF BREATH: 0
VOMITING: 0
CHILLS: 0
BLOOD IN STOOL: 1
HEADACHES: 0
NAUSEA: 0
DIAPHORESIS: 0
CONSTIPATION: 0

## 2025-01-25 NOTE — ED NOTES
Attempted to call report - unable as they stated patient placement hasn't given them the information - will retry in 30 min.
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
## 2025-01-25 NOTE — ED TRIAGE NOTES
Pt reports dark tarry stools x 1 week. Not on iron tabs or pepto. Pt denied hx of gI bleed, dizziness or light headd. Has recent influenza infection Still has generalized weakness. Pt is supposed to see [facility], but hasn't been able to follow up.      Triage Assessment (Adult)       Row Name 01/25/25 1100          Triage Assessment    Airway WDL WDL        Respiratory WDL    Respiratory WDL WDL        Skin Circulation/Temperature WDL    Skin Circulation/Temperature WDL WDL        Cardiac WDL    Cardiac WDL WDL        Peripheral/Neurovascular WDL    Peripheral Neurovascular WDL WDL        Cognitive/Neuro/Behavioral WDL    Cognitive/Neuro/Behavioral WDL WDL

## 2025-01-25 NOTE — PROGRESS NOTES
Transfer Type: [facility]  Transfer Triage Note    Date of call: 01/25/25  Time of call: 2:00 PM    Current Patient Location:  [facility] ED  Current Level of Care: ED    Vitals: BP:150 systolic HR: 60's O2 Sats: normal on RA  Diagnosis: upper GI bleed(ing).  Reason for requested transfer: Further diagnostic work up, management, and consultation for specialized care   Isolation Needs: None    Care everywhere has been updated and reviewed: No  Necessary images have been sent through PACS: No    If patient is transferring for specialty care or specific procedure, the specialist required has participated in the transfer call and agreed with need for transfer and anticipated timeline: Yes, Provider name: Likely to need EGD or colonoscopy specialty with: GI service    Transfer accepted: Yes  Stability of Patient: Patient is at risk for instability, however patient requires urgent transfer and does not meet ICU criteria     Level of Care Needed: Med Surg  Telemetry Needed:  Med (Remote) Telemetry  Expected Time of Arrival for Transfer: 8-24 hours  Arrival Location:  [facility]     Recommendations for Management and Stabilization: Not needed    Additional Comments: 69 year old male without h/o chronic liver disease, presents with vital signs stable lower GI bleed(ing) and Hgb drop.  Stable and ok for [facility].  Patient accepted to medical surgical unit with telemetry.    Krish Preciado MD

## 2025-01-25 NOTE — TELEPHONE ENCOUNTER
"[place]  A few weeks ago: dx with influenza A with bad diarrhea. Yesterday he had \"a black stool x2 like thick tar.\" Overnight it became runny black diarrhea=at least 5 times, twice this morning. His stomach was growling and gassy last night, but not painful. He feels a little weak, not faint. Hx of stroke a year ago--he takes a low dose aspirin.     Triaged to a disposition of Go to ED now: he intends to go to the [place] ED--daughter will drive.     Gayla Woo RN Triage Nurse Advisor 9:06 AM 1/25/2025  Reason for Disposition   Black or tarry bowel movements  (Exception: Chronic-unchanged black-grey BMs AND is taking iron pills or Pepto-Bismol.)    Additional Information   Negative: Shock suspected (e.g., cold/pale/clammy skin, too weak to stand, low BP, rapid pulse)   Negative: Difficult to awaken or acting confused (e.g., disoriented, slurred speech)   Negative: Sounds like a life-threatening emergency to the triager   Negative: Vomiting also present and worse than the diarrhea   Negative: [1] Blood in stool AND [2] without diarrhea   Negative: Diarrhea in a cancer patient who is currently (or recently) receiving chemotherapy or radiation therapy, or cancer patient who has metastatic or end-stage cancer and is receiving palliative care   Negative: Diarrhea begins while taking an antibiotic by mouth (oral antibiotic)   Negative: [1] SEVERE abdominal pain (e.g., excruciating) AND [2] present > 1 hour   Negative: [1] SEVERE abdominal pain AND [2] age > 60 years   Negative: [1] Blood in the stool AND [2] moderate or large amount of blood    Protocols used: Diarrhea-A-AH    "

## 2025-01-25 NOTE — LETTER
[place] [address] [facility]  [address] [place] [address]  Phone: [phone]    January 27, 2025        Donnie Ernandez  [address] [place] [place] [address]          To whom it may concern:    RE: Donnie Ernandez    Patient was seen and treated today at our hospital and missed work.  Patient may return to work 1/30/25 with the following:  No restrictions    Please contact me for questions or concerns.      Sincerely,      Christie Burnham NP  [facility]

## 2025-01-25 NOTE — ED NOTES
Warm handoff to [facility] - patient urinated prior to transport - tolerated well - no stool.

## 2025-01-25 NOTE — ED NOTES
Pt up to bedside commode with A1. Writer placed pt on tele. SR. Pt denied dizziness/light headed. Pt denied hx of GI bleed, pepto, iron tabs. Writer spoke to daughterUziel if pt has hx of dementia as pt is repeating questions. Per daughter, pt spouse passed away 3 weeks ago related to influenza. Pt does have hx of CVA residual symptoms as far as slower processing.

## 2025-01-26 LAB
ALBUMIN UR-MCNC: NEGATIVE MG/DL
ANION GAP SERPL CALCULATED.3IONS-SCNC: 11 MMOL/L (ref 7–15)
APPEARANCE UR: CLEAR
BILIRUB UR QL STRIP: NEGATIVE
BUN SERPL-MCNC: 20.9 MG/DL (ref 8–23)
CALCIUM SERPL-MCNC: 8.2 MG/DL (ref 8.8–10.4)
CHLORIDE SERPL-SCNC: 109 MMOL/L (ref 98–107)
COLOR UR AUTO: ABNORMAL
CREAT SERPL-MCNC: 0.93 MG/DL (ref 0.67–1.17)
EGFRCR SERPLBLD CKD-EPI 2021: 89 ML/MIN/1.73M2
ERYTHROCYTE [DISTWIDTH] IN BLOOD BY AUTOMATED COUNT: 16.4 % (ref 10–15)
FERRITIN SERPL-MCNC: 529 NG/ML (ref 31–409)
GLUCOSE SERPL-MCNC: 86 MG/DL (ref 70–99)
GLUCOSE UR STRIP-MCNC: NEGATIVE MG/DL
HCO3 SERPL-SCNC: 19 MMOL/L (ref 22–29)
HCT VFR BLD AUTO: 25.6 % (ref 40–53)
HGB BLD-MCNC: 8 G/DL (ref 13.3–17.7)
HGB BLD-MCNC: 8.5 G/DL (ref 13.3–17.7)
HGB BLD-MCNC: 9.2 G/DL (ref 13.3–17.7)
HGB UR QL STRIP: NEGATIVE
KETONES UR STRIP-MCNC: 20 MG/DL
LEUKOCYTE ESTERASE UR QL STRIP: NEGATIVE
MAGNESIUM SERPL-MCNC: 1.8 MG/DL (ref 1.7–2.3)
MCH RBC QN AUTO: 31.1 PG (ref 26.5–33)
MCHC RBC AUTO-ENTMCNC: 33.2 G/DL (ref 31.5–36.5)
MCV RBC AUTO: 94 FL (ref 78–100)
MUCOUS THREADS #/AREA URNS LPF: PRESENT /LPF
NITRATE UR QL: NEGATIVE
PH UR STRIP: 5.5 [PH] (ref 5–7)
PLATELET # BLD AUTO: 317 10E3/UL (ref 150–450)
POTASSIUM SERPL-SCNC: 4.1 MMOL/L (ref 3.4–5.3)
RBC # BLD AUTO: 2.73 10E6/UL (ref 4.4–5.9)
RBC URINE: 0 /HPF
SODIUM SERPL-SCNC: 139 MMOL/L (ref 135–145)
SP GR UR STRIP: 1.02 (ref 1–1.03)
UPPER GI ENDOSCOPY: NORMAL
UROBILINOGEN UR STRIP-MCNC: NORMAL MG/DL
VIT B12 SERPL-MCNC: 319 PG/ML (ref 232–1245)
WBC # BLD AUTO: 13.9 10E3/UL (ref 4–11)
WBC URINE: <1 /HPF

## 2025-01-26 PROCEDURE — 250N000013 HC RX MED GY IP 250 OP 250 PS 637: Performed by: INTERNAL MEDICINE

## 2025-01-26 PROCEDURE — 250N000011 HC RX IP 250 OP 636: Performed by: INTERNAL MEDICINE

## 2025-01-26 PROCEDURE — 250N000009 HC RX 250: Performed by: INTERNAL MEDICINE

## 2025-01-26 PROCEDURE — 43239 EGD BIOPSY SINGLE/MULTIPLE: CPT | Performed by: INTERNAL MEDICINE

## 2025-01-26 PROCEDURE — 0DB68ZX EXCISION OF STOMACH, VIA NATURAL OR ARTIFICIAL OPENING ENDOSCOPIC, DIAGNOSTIC: ICD-10-PCS | Performed by: INTERNAL MEDICINE

## 2025-01-26 PROCEDURE — 83735 ASSAY OF MAGNESIUM: CPT | Performed by: INTERNAL MEDICINE

## 2025-01-26 PROCEDURE — 82565 ASSAY OF CREATININE: CPT | Performed by: INTERNAL MEDICINE

## 2025-01-26 PROCEDURE — 88305 TISSUE EXAM BY PATHOLOGIST: CPT | Mod: 26 | Performed by: PATHOLOGY

## 2025-01-26 PROCEDURE — 85041 AUTOMATED RBC COUNT: CPT | Performed by: INTERNAL MEDICINE

## 2025-01-26 PROCEDURE — 85027 COMPLETE CBC AUTOMATED: CPT | Performed by: INTERNAL MEDICINE

## 2025-01-26 PROCEDURE — 88312 SPECIAL STAINS GROUP 1: CPT | Mod: 26 | Performed by: PATHOLOGY

## 2025-01-26 PROCEDURE — 120N000001 HC R&B MED SURG/OB

## 2025-01-26 PROCEDURE — 88342 IMHCHEM/IMCYTCHM 1ST ANTB: CPT | Mod: 26 | Performed by: PATHOLOGY

## 2025-01-26 PROCEDURE — 999N000099 HC STATISTIC MODERATE SEDATION < 10 MIN: Performed by: INTERNAL MEDICINE

## 2025-01-26 PROCEDURE — 258N000003 HC RX IP 258 OP 636: Performed by: INTERNAL MEDICINE

## 2025-01-26 PROCEDURE — 81001 URINALYSIS AUTO W/SCOPE: CPT | Performed by: INTERNAL MEDICINE

## 2025-01-26 PROCEDURE — 0DB58ZX EXCISION OF ESOPHAGUS, VIA NATURAL OR ARTIFICIAL OPENING ENDOSCOPIC, DIAGNOSTIC: ICD-10-PCS | Performed by: INTERNAL MEDICINE

## 2025-01-26 PROCEDURE — 36415 COLL VENOUS BLD VENIPUNCTURE: CPT | Performed by: INTERNAL MEDICINE

## 2025-01-26 PROCEDURE — 88305 TISSUE EXAM BY PATHOLOGIST: CPT | Mod: TC | Performed by: INTERNAL MEDICINE

## 2025-01-26 PROCEDURE — 99233 SBSQ HOSP IP/OBS HIGH 50: CPT | Performed by: INTERNAL MEDICINE

## 2025-01-26 PROCEDURE — 85018 HEMOGLOBIN: CPT | Performed by: INTERNAL MEDICINE

## 2025-01-26 PROCEDURE — 88342 IMHCHEM/IMCYTCHM 1ST ANTB: CPT | Mod: TC | Performed by: INTERNAL MEDICINE

## 2025-01-26 PROCEDURE — 80048 BASIC METABOLIC PNL TOTAL CA: CPT | Performed by: INTERNAL MEDICINE

## 2025-01-26 RX ORDER — METOPROLOL SUCCINATE 100 MG/1
100 TABLET, EXTENDED RELEASE ORAL DAILY
Status: DISCONTINUED | OUTPATIENT
Start: 2025-01-26 | End: 2025-01-27 | Stop reason: HOSPADM

## 2025-01-26 RX ORDER — ASPIRIN 81 MG/1
81 TABLET ORAL DAILY
Status: DISCONTINUED | OUTPATIENT
Start: 2025-01-27 | End: 2025-01-27 | Stop reason: HOSPADM

## 2025-01-26 RX ORDER — ASPIRIN 81 MG/1
81 TABLET ORAL DAILY
Qty: 100 TABLET | Refills: 0 | Status: SHIPPED | OUTPATIENT
Start: 2025-01-27

## 2025-01-26 RX ORDER — LOSARTAN POTASSIUM 100 MG/1
100 TABLET ORAL DAILY
Status: DISCONTINUED | OUTPATIENT
Start: 2025-01-26 | End: 2025-01-27 | Stop reason: HOSPADM

## 2025-01-26 RX ORDER — FENTANYL CITRATE 50 UG/ML
INJECTION, SOLUTION INTRAMUSCULAR; INTRAVENOUS PRN
Status: DISCONTINUED | OUTPATIENT
Start: 2025-01-26 | End: 2025-01-26 | Stop reason: HOSPADM

## 2025-01-26 RX ORDER — PANTOPRAZOLE SODIUM 40 MG/1
40 TABLET, DELAYED RELEASE ORAL 2 TIMES DAILY
Qty: 90 TABLET | Refills: 0 | Status: SHIPPED | OUTPATIENT
Start: 2025-01-26

## 2025-01-26 RX ORDER — ZOLPIDEM TARTRATE 5 MG/1
10 TABLET ORAL
Status: DISCONTINUED | OUTPATIENT
Start: 2025-01-26 | End: 2025-01-27 | Stop reason: HOSPADM

## 2025-01-26 RX ORDER — ROSUVASTATIN CALCIUM 10 MG/1
10 TABLET, COATED ORAL DAILY
Status: DISCONTINUED | OUTPATIENT
Start: 2025-01-26 | End: 2025-01-27 | Stop reason: HOSPADM

## 2025-01-26 RX ADMIN — SODIUM CHLORIDE, POTASSIUM CHLORIDE, SODIUM LACTATE AND CALCIUM CHLORIDE: 600; 310; 30; 20 INJECTION, SOLUTION INTRAVENOUS at 14:14

## 2025-01-26 RX ADMIN — ROSUVASTATIN CALCIUM 10 MG: 10 TABLET, FILM COATED ORAL at 14:13

## 2025-01-26 RX ADMIN — THIAMINE HCL TAB 100 MG 100 MG: 100 TAB at 08:30

## 2025-01-26 RX ADMIN — PANTOPRAZOLE SODIUM 40 MG: 40 INJECTION, POWDER, FOR SOLUTION INTRAVENOUS at 08:30

## 2025-01-26 RX ADMIN — METOPROLOL SUCCINATE 100 MG: 100 TABLET, EXTENDED RELEASE ORAL at 14:13

## 2025-01-26 RX ADMIN — PANTOPRAZOLE SODIUM 40 MG: 40 INJECTION, POWDER, FOR SOLUTION INTRAVENOUS at 20:20

## 2025-01-26 RX ADMIN — SODIUM CHLORIDE, POTASSIUM CHLORIDE, SODIUM LACTATE AND CALCIUM CHLORIDE: 600; 310; 30; 20 INJECTION, SOLUTION INTRAVENOUS at 03:57

## 2025-01-26 RX ADMIN — LOSARTAN POTASSIUM 100 MG: 100 TABLET, FILM COATED ORAL at 14:12

## 2025-01-26 ASSESSMENT — ACTIVITIES OF DAILY LIVING (ADL)
ADLS_ACUITY_SCORE: 36

## 2025-01-26 NOTE — PLAN OF CARE
Goal Outcome Evaluation:6/26/25 [id number]  Orientation/Cognitive: Alert/Oriented x4, forgetful, anxious at times.  Mobility Level/Assist Equipment: SBA  to the bathroom.  Fall Risk (Y/N): Yes  Behavior Concerns: Green  Pain Management: Denies  Tele/VS/O2: VSS on RA   ABNL Lab/BG: Hgb 8.0 down from 9.2 (Q6hr checks); WBC 13.9; hematocrit 25.6  Diet:low fiber, tolerating well.  Bowel/Bladder: Continent; small BM x 2, no blood noticed.  Skin scattered bruising and tattoos.  Drains/Devices: PIV x 2 SL   Tests and procedures;EGD completed   Discharge: pending progress, possible discharge tomorrow if Hgb  stable.Sent UA.
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
## 2025-01-26 NOTE — PROGRESS NOTES
PRE-PROCEDURE NOTE    CHIEF COMPLAINT / REASON FOR PROCEDURE:  dark stools    History and Physical Reviewed:  yes    PRE-SEDATION ASSESSMENT:    Lung Exam:  normal  Heart Exam:  normal  Mallampati Airway Classification:  2  Previous reaction to anesthesia/sedation:   no  Sedation plan based on assessment:  IV sed  Comment(s):  risks explained  ASA Classification:  3    IMPRESSION:  rule out upper GI bleeding    PLAN:  egd       Stephanie Estrada MD, MD
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
## 2025-01-26 NOTE — PLAN OF CARE
Summary: Transfer from [facility] for possible GI bleed    Date & Time: 1/25/25 [id number]  Orientation/Cognitive: Alert/Oriented x4; anxious at times; only able to doze for short amounts of time overnight  Mobility Level/Assist Equipment: SBA Ambulating to the bathroom  Fall Risk (Y/N): Yes  Behavior Concerns: Green  Pain Management: Denies  Tele/VS/O2: VSS on RA (Q4hr vitals)  TELE: NSR  ABNL Lab/BG: Hgb 8.5 down from 9.2 (Q6hr checks); WBC 13.9; hematocrit 25.6  Diet: NPO except medications  Bowel/Bladder: Continent; no red blood noted in stool overnight; stool output minimal  Skin Concerns: Bruising under L PIV dressing prior to admission; redness/rash to upper chest  Drains/Devices: Right PIV infusing Lactated Ringers at 100 ml/hr; Left PIV saline locked  Tests/Procedures for next shift: MNG to consult 1/26 am with possible endoscopy  Anticipated DC date & active delays: Pending  Other Important Information: GI consult, pt states that he does not use CPAP, declined use

## 2025-01-26 NOTE — PHARMACY-ADMISSION MEDICATION HISTORY
Pharmacist Admission Medication History    Admission medication history is complete. The information provided in this note is only as accurate as the sources available at the time of the update.    Information Source(s): Patient and CareEverywhere/SureScripts via in-person    Pertinent Information: None    Changes made to PTA medication list:  Added: None  Deleted: Albuterol, Methylprednisone, Naltrexone  Changed: None    Allergies reviewed with patient and updates made in EHR: no    Medication History Completed By: Christie Marina RPH 1/25/2025 7:22 PM    PTA Med List   Medication Sig Last Dose/Taking    Acetaminophen (TYLENOL EXTRA STRENGTH PO) Take 1,000 mg by mouth daily as needed  Taking As Needed    Ascorbic Acid (VITAMIN C PO)  1/24/2025    aspirin (ASA) 81 MG chewable tablet Take 1 tablet (81 mg) by mouth daily 1/24/2025    losartan (COZAAR) 100 MG tablet Take 1 tablet (100 mg) by mouth daily. 1/24/2025    metoprolol succinate ER (TOPROL XL) 100 MG 24 hr tablet Take 1 tablet (100 mg) by mouth daily. 1/24/2025    rosuvastatin (CRESTOR) 10 MG tablet Take 1 tablet (10 mg) by mouth daily. 1/24/2025    VITAMIN D PO 1000 once a day 1/24/2025    zolpidem (AMBIEN) 10 MG tablet Take 1 tablet (10 mg) by mouth nightly as needed for sleep 1/24/2025

## 2025-01-26 NOTE — PROGRESS NOTES
Admission    Patient arrives to room 625 via cart from [facility].  Care plan note:     Inpatient nursing criteria listed below were met:    Did you put disposition on whiteboard and in sticky note: no  Full skin assessment done yes  Isolation education started/completed NA  Patient allergies verified with patient: yes  Fall Risk? (Yes  Primary Care Plan initiated: yes  Home medications documented in belongings flowsheet: NA  Patient belongings documented in belongings flowsheet: Yes  Reminder note (belongings/ medications) placed in discharge instructions:no  Admission profile/ required documentation complete: Yes  If patient is a 72 hour hold/Commitment are belongings removed from room and locked up? NA
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
## 2025-01-26 NOTE — PROGRESS NOTES
Date & Time: 1/25/25 0674-0481  Summary: Transfer from [facility] for possible GI bleed  Orientation/Cognitive: A&Ox4  Mobility Level/Assist Equipment: SBA Ambulating to the bathroom  Fall Risk (Y/N): Yes  Behavior Concerns: Green  Pain Management: Denies  Tele/VS/O2: VSS on RA  TELE: NSR  ABNL Lab/BG: Hgb 8.1, 7.8, 9.2, WBC 13.0.  Diet: Clear liquid, NPO at midnight  Bowel/Bladder: Continent of bowel and bladder. No BM or bloody stool during my shift  Skin Concerns: Clean and Intact  Drains/Devices: Right PIV infusing LR at 100 ml/hr and Left PIV SL  Tests/Procedures for next shift: Endoscopy in the morning  Anticipated DC date & active delays: TBD  Other Important Information: GI consulted. Orthostatic BP negative, Hemoglobin every 6 hours. Patient stated that he does not use a CPA machine at night.      BP (!) 152/71 (BP Location: Right arm)   Pulse 70   Temp 98.2  F (36.8  C) (Oral)   Resp 16   SpO2 98%

## 2025-01-26 NOTE — PROGRESS NOTES
[facility] Brief Note    EGD completed.      Findings:  - Esophagitis, gastritis and duodenitis.  Likely all reflux and alcohol, ASA could be contributing.      Plan:  - ok to restart ASA tomorrow, always take with food  - Pantoprazole 40mg BID IV while inpatient, at discharge 40mg BID orally for 8 weeks then 40mg once daily  - stop alcohol  - will arrange GI Clinic follow up in a few weeks.  - await biopsies for H. Pylori    I am ok if patient discharges later today if stable.    Stephanie Estrada MD  [facility]  [phone]

## 2025-01-27 VITALS
WEIGHT: 161.4 LBS | OXYGEN SATURATION: 96 % | HEIGHT: 66 IN | TEMPERATURE: 98.8 F | SYSTOLIC BLOOD PRESSURE: 124 MMHG | BODY MASS INDEX: 25.94 KG/M2 | HEART RATE: 59 BPM | DIASTOLIC BLOOD PRESSURE: 60 MMHG | RESPIRATION RATE: 17 BRPM

## 2025-01-27 LAB
ANION GAP SERPL CALCULATED.3IONS-SCNC: 9 MMOL/L (ref 7–15)
BUN SERPL-MCNC: 13.3 MG/DL (ref 8–23)
CALCIUM SERPL-MCNC: 8.4 MG/DL (ref 8.8–10.4)
CHLORIDE SERPL-SCNC: 109 MMOL/L (ref 98–107)
CREAT SERPL-MCNC: 0.92 MG/DL (ref 0.67–1.17)
EGFRCR SERPLBLD CKD-EPI 2021: >90 ML/MIN/1.73M2
ERYTHROCYTE [DISTWIDTH] IN BLOOD BY AUTOMATED COUNT: 16.2 % (ref 10–15)
FOLATE RBC-MCNC: 670 NG/ML
GLUCOSE SERPL-MCNC: 100 MG/DL (ref 70–99)
HCO3 SERPL-SCNC: 21 MMOL/L (ref 22–29)
HCT VFR BLD AUTO: 26.3 % (ref 40–53)
HCT VFR BLD CALC: NORMAL %
HGB BLD-MCNC: 8 G/DL (ref 13.3–17.7)
HGB BLD-MCNC: 8.6 G/DL (ref 13.3–17.7)
MAGNESIUM SERPL-MCNC: 1.8 MG/DL (ref 1.7–2.3)
MCH RBC QN AUTO: 31.4 PG (ref 26.5–33)
MCHC RBC AUTO-ENTMCNC: 32.7 G/DL (ref 31.5–36.5)
MCV RBC AUTO: 96 FL (ref 78–100)
PLATELET # BLD AUTO: 318 10E3/UL (ref 150–450)
POTASSIUM SERPL-SCNC: 4.2 MMOL/L (ref 3.4–5.3)
RBC # BLD AUTO: 2.74 10E6/UL (ref 4.4–5.9)
SODIUM SERPL-SCNC: 139 MMOL/L (ref 135–145)
WBC # BLD AUTO: 11.7 10E3/UL (ref 4–11)

## 2025-01-27 PROCEDURE — 250N000013 HC RX MED GY IP 250 OP 250 PS 637: Performed by: INTERNAL MEDICINE

## 2025-01-27 PROCEDURE — 85027 COMPLETE CBC AUTOMATED: CPT | Performed by: INTERNAL MEDICINE

## 2025-01-27 PROCEDURE — 99239 HOSP IP/OBS DSCHRG MGMT >30: CPT

## 2025-01-27 PROCEDURE — 80048 BASIC METABOLIC PNL TOTAL CA: CPT | Performed by: INTERNAL MEDICINE

## 2025-01-27 PROCEDURE — 99207 PR NO BILLABLE SERVICE THIS VISIT: CPT | Performed by: HOSPITALIST

## 2025-01-27 PROCEDURE — 36415 COLL VENOUS BLD VENIPUNCTURE: CPT | Performed by: INTERNAL MEDICINE

## 2025-01-27 PROCEDURE — 83735 ASSAY OF MAGNESIUM: CPT | Performed by: INTERNAL MEDICINE

## 2025-01-27 PROCEDURE — 250N000009 HC RX 250: Performed by: INTERNAL MEDICINE

## 2025-01-27 RX ADMIN — LOSARTAN POTASSIUM 100 MG: 100 TABLET, FILM COATED ORAL at 09:33

## 2025-01-27 RX ADMIN — THIAMINE HCL TAB 100 MG 100 MG: 100 TAB at 09:34

## 2025-01-27 RX ADMIN — PANTOPRAZOLE SODIUM 40 MG: 40 INJECTION, POWDER, FOR SOLUTION INTRAVENOUS at 09:35

## 2025-01-27 RX ADMIN — METOPROLOL SUCCINATE 100 MG: 100 TABLET, EXTENDED RELEASE ORAL at 09:34

## 2025-01-27 RX ADMIN — ROSUVASTATIN CALCIUM 10 MG: 10 TABLET, FILM COATED ORAL at 09:34

## 2025-01-27 RX ADMIN — ASPIRIN 81 MG: 81 TABLET, COATED ORAL at 09:34

## 2025-01-27 ASSESSMENT — ACTIVITIES OF DAILY LIVING (ADL)
ADLS_ACUITY_SCORE: 36
DEPENDENT_IADLS:: COOKING
ADLS_ACUITY_SCORE: 36

## 2025-01-27 NOTE — PROGRESS NOTES
Discharge    Patient discharged to home  via  private transportation  with  daughter   Care plan note done     Listed belongings gathered and given to patient (including from security/pharmacy). Yes  Care Plan and Patient education resolved: Yes  Prescriptions if needed, hard copies sent with patient  NA  Medication Bin checked and emptied on discharge Yes  SW/care coordinator/charge RN aware of discharge: Yes

## 2025-01-27 NOTE — CONSULTS
Care Management Initial Consult    General Information  Assessment completed with: Patient, Children, Daughter Uziel  Type of CM/SW Visit: Offer D/C Planning    Primary Care Provider verified and updated as needed: Yes   Readmission within the last 30 days: no previous admission in last 30 days      Reason for Consult: discharge planning  Advance Care Planning: Advance Care Planning Reviewed: no concerns identified          Communication Assessment  Patient's communication style: spoken language (English or Bilingual)    Hearing Difficulty or Deaf: no   Wear Glasses or Blind: yes    Cognitive  Cognitive/Neuro/Behavioral: WDL        Orientation: oriented x 4     Best Language: 0 - No aphasia  Speech: clear, spontaneous, logical    Living Environment:   People in home: alone     Current living Arrangements: house      Able to return to prior arrangements: yes  Living Arrangement Comments: Spouse  two weeks ago    Family/Social Support:  Care provided by: self  Provides care for: no one  Marital Status:   Support system: Children          Description of Support System: Supportive    Support Assessment: Adequate family and caregiver support    Current Resources:   Patient receiving home care services: No        Community Resources: None  Equipment currently used at home: none  Supplies currently used at home: None    Employment/Financial:  Employment Status: employed full-time     Employment/ Comments: Lead Maintenance for [employer]  Financial Concerns: none      Does the patient's insurance plan have a 3 day qualifying hospital stay waiver?  Yes     Which insurance plan 3 day waiver is available? Alternative insurance waiver    Will the waiver be used for post-acute placement? No    Lifestyle & Psychosocial Needs:  Social Drivers of Health     Food Insecurity: High Risk (2023)    Food Insecurity     Within the past 12 months, did you worry that your food would run out before you got  money to buy more?: Yes     Within the past 12 months, did the food you bought just not last and you didn t have money to get more?: No   Depression: Not at risk (1/9/2025)    PHQ-2     PHQ-2 Score: 2   Housing Stability: Low Risk  (12/20/2023)    Housing Stability     Do you have housing? : Yes     Are you worried about losing your housing?: No   Tobacco Use: Low Risk  (1/26/2025)    Patient History     Smoking Tobacco Use: Never     Smokeless Tobacco Use: Never     Passive Exposure: Not on file   Financial Resource Strain: Low Risk  (12/20/2023)    Financial Resource Strain     Within the past 12 months, have you or your family members you live with been unable to get utilities (heat, electricity) when it was really needed?: No   Alcohol Use: Not on file   Transportation Needs: Low Risk  (12/20/2023)    Transportation Needs     Within the past 12 months, has lack of transportation kept you from medical appointments, getting your medicines, non-medical meetings or appointments, work, or from getting things that you need?: No   Physical Activity: Not on file   Interpersonal Safety: Low Risk  (1/25/2025)    Interpersonal Safety     Do you feel physically and emotionally safe where you currently live?: Yes     Within the past 12 months, have you been hit, slapped, kicked or otherwise physically hurt by someone?: No     Within the past 12 months, have you been humiliated or emotionally abused in other ways by your partner or ex-partner?: No   Stress: Not on file   Social Connections: Not on file   Health Literacy: Not on file       Functional Status:  Prior to admission patient needed assistance:   Dependent ADLs:: Independent  Dependent IADLs:: Cooking       Mental Health Status:  Mental Health Status: No Current Concerns       Chemical Dependency Status:  Chemical Dependency Status: Current Concern  Chemical Dependency Management: Other (see comment) (patient was a daily beer drinker with increase in intake a few  weeks ago after his spouse .  Patient reports he is done drinking alcohol)          Values/Beliefs:  Spiritual, Cultural Beliefs, Alevism Practices, Values that affect care: no               Discussed  Partnership in Safe Discharge Planning  document with patient/family: No    Additional Information:  Care Coordinator met with patient and his Daughter Uziel, introduced self and role.  Patient's spouse dies a few weeks ago due to complications of influenza and refusal to seek medical attention.  Patient noted he is doing OK and declines need for additional grief support.  Have encouraged patient to reach out to his PCP if he is feeling he needs help.  Patient works full time during the day.  Patient is independent with ADLs and IADLs.  Patient's spouse had done all the cooking, cleaning and laundry.  Patient's daughters are supportive and doing cooking for patient at the present time.  Patient had a right sided stroke a little over a year ago that left his left hand weak.  Patient was left hand dominant.  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    Patient had a CPAP years ago, but was intolerant to using this.  Patient is scheduled to have another sleep study consultation on 2/3/25.    Patient follows with Neurology yearly.    Next Steps:   No further care management intervention anticipated at this time.  Please re-consult if further needs arise.  Care management signing off.       Janet Crane RN  Inpatient Care Management  [phone]
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
## 2025-01-27 NOTE — PLAN OF CARE
Summary: Transfer from [facility] for possible GI bleed     Date & Time: 1/26/25 4519-9114  Orientation/Cognitive: Alert/Oriented x4; anxious at times; slept much better overnight compared to Saturday night  Mobility Level/Assist Equipment: SBA Ambulating to the bathroom; slight tremor and decreased fine motor skills to L hand from stroke 12/20/2023  Fall Risk (Y/N): Yes  Behavior Concerns: Green  Pain Management: Denies  Tele/VS/O2: VSS on RA  TELE: NSR  ABNL Lab/BG: Hgb 8.0 for 1/26 1303 and 2346 draws (Q6hr checks); WBC 13.9; hematocrit 25.6  Diet: Low Fiber  Bowel/Bladder: Continent, no BM  Skin Concerns: Bruising under L PIV dressing prior to admission  Drains/Devices: Right/Left PIVs saline locked  Tests/Procedures for next shift: EGD completed 01/26 with Esophagitis/Gastritis/Duodenitis  Anticipated DC date & active delays: Possibly 01/27 if Hgb stable; U/A resulted  Other Important Information: [facility] following, pt states that he does not use CPAP, declined use

## 2025-01-27 NOTE — PLAN OF CARE
Goal Outcome Evaluation:      Plan of Care Reviewed With: patient    Overall Patient Progress: improvingOverall Patient Progress: improving     Shift:3700-7688    Summary: Transfer from [facility] for possible GI bleed    Orientation/Cognitive: Alert/Oriented x4;, forgetful, anxious at times.  Mobility Level/Assist Equipment: SBA  to the bathroom.  Fall Risk (Y/N): Yes  Behavior Concerns: Green  Pain Management: Denies  Tele/VS/O2: VSS on RA   TELE: NSR  ABNL Lab/BG: Hgb 8.0 down from 9.2 (Q6hr checks); WBC 13.9; hematocrit 25.6  Diet:low fiber  Bowel/Bladder: Continent;No B/M this shift  Skin scattered bruising and tattoos.  Drains/Devices: PIV/SL x2  Tests and procedures;EGD completed   Discharge: pending progress, possible discharge tomorrow if Hgb  stable.

## 2025-01-27 NOTE — PLAN OF CARE
Goal Outcome Evaluation: reviewed with patient   DATE & TIME: 1/27/2025 7-1100    Cognitive Concerns/ Orientation : alert and oriented x 4    BEHAVIOR & AGGRESSION TOOL COLOR:  green   CIWA SCORE:  na    ABNL VS/O2:  vss RA   MOBILITY:  up with sba   PAIN MANAGMENT:  denies    DIET:  low fiber   BOWEL/BLADDER:  continent of urine and stool   ABNL LAB/BG:  hgb 8.6  DRAIN/DEVICES:  SL x 2 removed   TELEMETRY RHYTHM:  SR  SKIN:  bruised left fore arm   TESTS/PROCEDURES:  none   D/C DATE:  today   Discharge Barriers:  none   OTHER IMPORTANT INFO:  pt is ready for discharge will review discharge instructions with patient prior to discharge

## 2025-01-28 ENCOUNTER — PATIENT OUTREACH (OUTPATIENT)
Dept: CARE COORDINATION | Facility: CLINIC | Age: 70
End: 2025-01-28
Payer: COMMERCIAL

## 2025-01-28 ASSESSMENT — ACTIVITIES OF DAILY LIVING (ADL): DEPENDENT_IADLS:: COOKING

## 2025-01-28 NOTE — PROGRESS NOTES
Clinic Care Coordination Contact  Clinic Care Coordination Contact  OUTREACH    Referral Information:  Referral Source: IP Handoff    Primary Diagnosis: GI Disorders    Chief Complaint   Patient presents with    Clinic Care Coordination - Post Hospital     Clinic Care Coordination RN         Universal Utilization:   [facility]  Hospitalist Discharge Summary       Date of Admission:  1/25/2025  Date of Discharge:  1/27/2025  Discharging Provider: Christie Burnham NP  Discharge Service: Hospitalist Service        Discharge Diagnoses  GI bleed  Melena  Esophagitis   Gastritis   Duodenitis  Normocytic anemia 12 range  History of GERD  Clinic Utilization  Difficulty keeping appointments:: No  Compliance Concerns: No  No-Show Concerns: No  No PCP office visit in Past Year: No  Utilization      No Show Count (past year)  2             ED Visits  3             Hospital Admissions  1                    Current as of: 1/28/2025 11:44 AM                Clinical Concerns:  Current Medical Concerns:    Patient denies any further black stools  Had a BM this morning and back to normal color  Gastritis /esophagitis caused by ASA Reflux and alcohol    Patient doesn't fel he has a problem with alcohol  Maybe drank a little more than usual sting he just lost his wife from Influenza A  Current Behavioral Concerns: No    Education Provided to patient: CC RN introduced CC role    Pain  Pain (GOAL):: No  Health Maintenance Reviewed: Not assessed  Clinical Pathway: None    Medication Management:  Medication review status: Medications reviewed.  Changes noted per patient report.       Functional Status:  Dependent ADLs:: Independent  Dependent IADLs:: Cooking  Bed or wheelchair confined:: No  Mobility Status: Independent  Fallen 2 or more times in the past year?: No  Any fall with injury in the past year?: No    Living Situation:  Current living arrangement:: I live alone    Lifestyle & Psychosocial  Olegario Langston or Inessa, our Maurilio and Rita, will be calling you after your discharge, on the phone number that you provided. They will be available as an additional support, if needed. If you wish to speak with one of them, you may contact Olegario Langston at [phone] or Chava Ford at [phone]. Needs:    Social Drivers of Health     Food Insecurity: High Risk (12/20/2023)    Food Insecurity     Within the past 12 months, did you worry that your food would run out before you got money to buy more?: Yes     Within the past 12 months, did the food you bought just not last and you didn t have money to get more?: No   Depression: Not at risk (1/9/2025)    PHQ-2     PHQ-2 Score: 2   Housing Stability: Low Risk  (12/20/2023)    Housing Stability     Do you have housing? : Yes     Are you worried about losing your housing?: No   Tobacco Use: Low Risk  (1/26/2025)    Patient History     Smoking Tobacco Use: Never     Smokeless Tobacco Use: Never     Passive Exposure: Not on file   Financial Resource Strain: Low Risk  (12/20/2023)    Financial Resource Strain     Within the past 12 months, have you or your family members you live with been unable to get utilities (heat, electricity) when it was really needed?: No   Alcohol Use: Not on file   Transportation Needs: Low Risk  (12/20/2023)    Transportation Needs     Within the past 12 months, has lack of transportation kept you from medical appointments, getting your medicines, non-medical meetings or appointments, work, or from getting things that you need?: No   Physical Activity: Not on file   Interpersonal Safety: Low Risk  (1/25/2025)    Interpersonal Safety     Do you feel physically and emotionally safe where you currently live?: Yes     Within the past 12 months, have you been hit, slapped, kicked or otherwise physically hurt by someone?: No     Within the past 12 months, have you been humiliated or emotionally abused in other ways by your partner or ex-partner?: No   Stress: Not on file   Social Connections: Not on file   Health Literacy: Not on file     Diet:: No added salt  Tube Feeding: No  Inadequate activity/exercise (GOAL):: No  Significant changes in sleep pattern (GOAL): No  Transportation means:: Family     Christian or spiritual beliefs that impact  treatment:: No  Mental health DX:: No  Mental health management concern (GOAL):: No  Chemical Dependency Status: Current Concern  Informal Support system:: Children           Resources and Interventions:  Current Resources:      Community Resources: None  Supplies Currently Used at Home: None  Equipment Currently Used at Home: none  Employment Status: employed full-time         Advance Care Plan/Directive  Advanced Care Plans/Directives on file:: No    Referrals Placed: None      Patient/Caregiver understanding: Patient expresses good understanding of discharge instructions        Future Appointments                In 6 days Mae Jones APRN CNP [facility]  Arrive at: Clinic A    In 6 days Last Boggs DO [facility]    In 6 months James Nieves MD [facility]            Plan:   Patient will keep his hospital follow up with PCP 2/3/2025  No unmet needs, no further care coordination is needed at this time     [facility]   Cuca Dupree RN, Care Coordinator   [facility]   E-mail [email or web address]   [phone]

## 2025-01-29 LAB
PATH REPORT.COMMENTS IMP SPEC: NORMAL
PATH REPORT.COMMENTS IMP SPEC: NORMAL
PATH REPORT.FINAL DX SPEC: NORMAL
PATH REPORT.GROSS SPEC: NORMAL
PATH REPORT.MICROSCOPIC SPEC OTHER STN: NORMAL
PATH REPORT.RELEVANT HX SPEC: NORMAL
PHOTO IMAGE: NORMAL

## 2025-02-03 ENCOUNTER — OFFICE VISIT (OUTPATIENT)
Dept: FAMILY MEDICINE | Facility: CLINIC | Age: 70
End: 2025-02-03
Payer: COMMERCIAL

## 2025-02-03 VITALS
HEART RATE: 47 BPM | WEIGHT: 166.6 LBS | SYSTOLIC BLOOD PRESSURE: 120 MMHG | OXYGEN SATURATION: 98 % | DIASTOLIC BLOOD PRESSURE: 64 MMHG | RESPIRATION RATE: 18 BRPM | HEIGHT: 66 IN | BODY MASS INDEX: 26.78 KG/M2 | TEMPERATURE: 98.3 F

## 2025-02-03 DIAGNOSIS — I10 BENIGN ESSENTIAL HYPERTENSION: ICD-10-CM

## 2025-02-03 DIAGNOSIS — Z09 HOSPITAL DISCHARGE FOLLOW-UP: ICD-10-CM

## 2025-02-03 DIAGNOSIS — N18.31 STAGE 3A CHRONIC KIDNEY DISEASE (H): ICD-10-CM

## 2025-02-03 DIAGNOSIS — K29.01 GASTROINTESTINAL HEMORRHAGE ASSOCIATED WITH ACUTE GASTRITIS: Primary | ICD-10-CM

## 2025-02-03 DIAGNOSIS — R01.1 HEART MURMUR: ICD-10-CM

## 2025-02-03 DIAGNOSIS — R60.0 PERIPHERAL EDEMA: ICD-10-CM

## 2025-02-03 DIAGNOSIS — I69.959 HEMIPLEGIA AND HEMIPARESIS FOLLOWING UNSPECIFIED CEREBROVASCULAR DISEASE AFFECTING UNSPECIFIED SIDE (H): ICD-10-CM

## 2025-02-03 LAB
ANION GAP SERPL CALCULATED.3IONS-SCNC: 10 MMOL/L (ref 7–15)
BUN SERPL-MCNC: 15.6 MG/DL (ref 8–23)
CALCIUM SERPL-MCNC: 8.7 MG/DL (ref 8.8–10.4)
CHLORIDE SERPL-SCNC: 107 MMOL/L (ref 98–107)
CREAT SERPL-MCNC: 1.37 MG/DL (ref 0.67–1.17)
EGFRCR SERPLBLD CKD-EPI 2021: 56 ML/MIN/1.73M2
ERYTHROCYTE [DISTWIDTH] IN BLOOD BY AUTOMATED COUNT: 15.2 % (ref 10–15)
GLUCOSE SERPL-MCNC: 96 MG/DL (ref 70–99)
HCO3 SERPL-SCNC: 24 MMOL/L (ref 22–29)
HCT VFR BLD AUTO: 28.4 % (ref 40–53)
HGB BLD-MCNC: 9.3 G/DL (ref 13.3–17.7)
MCH RBC QN AUTO: 31.5 PG (ref 26.5–33)
MCHC RBC AUTO-ENTMCNC: 32.7 G/DL (ref 31.5–36.5)
MCV RBC AUTO: 96 FL (ref 78–100)
NT-PROBNP SERPL-MCNC: 1923 PG/ML (ref 0–900)
PLATELET # BLD AUTO: 436 10E3/UL (ref 150–450)
POTASSIUM SERPL-SCNC: 3.9 MMOL/L (ref 3.4–5.3)
RBC # BLD AUTO: 2.95 10E6/UL (ref 4.4–5.9)
SODIUM SERPL-SCNC: 141 MMOL/L (ref 135–145)
WBC # BLD AUTO: 13.9 10E3/UL (ref 4–11)

## 2025-02-03 PROCEDURE — 99495 TRANSJ CARE MGMT MOD F2F 14D: CPT | Performed by: NURSE PRACTITIONER

## 2025-02-03 PROCEDURE — 80048 BASIC METABOLIC PNL TOTAL CA: CPT | Performed by: NURSE PRACTITIONER

## 2025-02-03 PROCEDURE — 36415 COLL VENOUS BLD VENIPUNCTURE: CPT | Performed by: NURSE PRACTITIONER

## 2025-02-03 PROCEDURE — 85027 COMPLETE CBC AUTOMATED: CPT | Performed by: NURSE PRACTITIONER

## 2025-02-03 PROCEDURE — 83880 ASSAY OF NATRIURETIC PEPTIDE: CPT | Performed by: NURSE PRACTITIONER

## 2025-02-03 RX ORDER — FUROSEMIDE 20 MG/1
20 TABLET ORAL DAILY
Qty: 7 TABLET | Refills: 0 | Status: SHIPPED | OUTPATIENT
Start: 2025-02-03 | End: 2025-02-10

## 2025-02-03 RX ORDER — PANTOPRAZOLE SODIUM 40 MG/1
40 TABLET, DELAYED RELEASE ORAL 2 TIMES DAILY
Qty: 90 TABLET | Refills: 3 | Status: SHIPPED | OUTPATIENT
Start: 2025-02-03

## 2025-02-03 RX ORDER — NALTREXONE HYDROCHLORIDE 50 MG/1
1 TABLET, FILM COATED ORAL
COMMUNITY
Start: 2025-01-27

## 2025-02-03 RX ORDER — METOPROLOL SUCCINATE 100 MG/1
100 TABLET, EXTENDED RELEASE ORAL DAILY
Qty: 90 TABLET | Refills: 3 | Status: SHIPPED | OUTPATIENT
Start: 2025-02-03

## 2025-02-03 ASSESSMENT — PAIN SCALES - GENERAL: PAINLEVEL_OUTOF10: NO PAIN (0)

## 2025-02-03 NOTE — LETTER
2/3/2025    Donnie Ernandez   1955        To Whom it May Concern;    Please excuse Donnie Ernandez from work on 2025 for medical reasons. He also had a clinic appointment this morning.      Sincerely,        MAYANK Lutz CNP

## 2025-02-03 NOTE — PROGRESS NOTES
Assessment & Plan     Gastrointestinal hemorrhage associated with acute gastritis  No current symptoms of bleeding or abdominal pain, and fatigue has resolved. Plan to continue pantoprazole for 8 weeks BID, then will be taking this once daily continuously. Discussed alcohol being an exacerbating factor to GI bleeding and recommended complete cessation. CBC ordered to assess anemia following GI bleed - Hgb starting to improve and is 9.3 today..  - pantoprazole (PROTONIX) 40 MG EC tablet; Take 1 tablet (40 mg) by mouth 2 times daily. Take samreen daily for 8weeks then reduce to once daily  - CBC with platelets; Future  - Basic metabolic panel  (Ca, Cl, CO2, Creat, Gluc, K, Na, BUN); Future  - CBC with platelets  - Basic metabolic panel  (Ca, Cl, CO2, Creat, Gluc, K, Na, BUN)    Benign essential hypertension  Reordered metoprolol, continue losartan.  BMP for monitoring.  - metoprolol succinate ER (TOPROL XL) 100 MG 24 hr tablet; Take 1 tablet (100 mg) by mouth daily.  - CBC with platelets; Future  - Basic metabolic panel  (Ca, Cl, CO2, Creat, Gluc, K, Na, BUN); Future  - CBC with platelets  - Basic metabolic panel  (Ca, Cl, CO2, Creat, Gluc, K, Na, BUN)    Hemiplegia and hemiparesis following unspecified cerebrovascular disease affecting unspecified side (H)  History of stroke in 12/23. Currently taking 81mg aspirin, also taking Crestor and managing HTN.    Stage 3a chronic kidney disease (H)  BMP for monitoring. New onset of BLE +2 edema from knees to feet.  Creatinine mildly elevated today.  Will recheck next week.    Hospital discharge follow-up  Reviewed hospital course and current symptoms. Per note GI to follow up in clinic with patient.    Peripheral edema  New onset of BLE +2 edema from knees to feet. Likely related to recent hospitalization and fluid overload; BNP ordered to rule out cause from heart function.   - BNP-N terminal pro; Future  - Echocardiogram Complete; Future  - BNP-N terminal pro    Heart  murmur  Systolic heart murmur ausculted over 2nd ICS on both sternal borders. Again may be related to possible fluid overload, echocardiogram ordered to rule out other structural causes.  - BNP-N terminal pro; Future  - Echocardiogram Complete; Future  - BNP-N terminal pro    BNP elevated.  Concern for CHF given edema, weight increase and BNP.  Recommend Lasix 20 mg daily for 7 days.  Patient has an echocardiogram scheduled for Monday.  Will recheck BMP and BNP in 1 week.      MED REC REQUIRED  Post Medication Reconciliation Status: discharge medications reconciled and changed, per note/orders    The risks, benefits and treatment options of prescribed medications or other treatments have been discussed with the patient. The patient verbalized their understanding and should call or follow up if no improvement or if they develop further problems.  HAWA Lutz   Donnie is a 69 year old, presenting for the following health issues:  Hospital F/U        2/3/2025     9:03 AM   Additional Questions   Roomed by Katie Morrow   Accompanied by self         2/3/2025     9:03 AM   Patient Reported Additional Medications   Patient reports taking the following new medications none     HPI     Patient hospitalized - for upper GI bleed with melena and anemia. He had been ill with influenza at the end of December and also lost his wife on . He had not been eating or hydrating well at this time due to these circumstances. He noted the onset of his dark stool when he was taking robitussin, so he thought it was related to this, he declined abdominal pain. He states he was drinking maximum of 2 beers a day in December but has had minimal to no alcohol since he was sick with influenza. His wife  on , and states he may have had a couple drinks around then but not every day. He was also very fatigued and lost his breath frequently leading into his recent hospitalization. He presented to  the ED with 3 day history of melena and some abdominal cramping, he received 1 unit of blood in the ED and was followed by GI while in the hospital. Aspirin 81mg was held during hospitalization. He had a EGD which confirmed esophagitis, gastritis and duodenitis. He was instructed to begin pantoprazole BID for 8 weeks and resumed 81mg EC aspirin.    Since his hospitalization, he declines further melena or any blood in stool, and no longer is feeling dizzy or fatigued today. He did note yesterday that he had some swelling on his lower legs when he took his socks off. He declines SOB, chest pain, palpitations, fevers. He has returned to work    Hospital Follow-up Visit:    Hospital/Nursing Home/IP Rehab Facility: [facility]  Date of Admission: 1/25/25  Date of Discharge: 1/27/25  Reason(s) for Admission: Upper GI bleeding  Was the patient in the ICU or did the patient experience delirium during hospitalization?  No  Do you have any other stressors you would like to discuss with your provider? No    Problems taking medications regularly:  None  Medication changes since discharge: None  Problems adhering to non-medication therapy:  None    Summary of hospitalization:  [facility] discharge summary reviewed  Diagnostic Tests/Treatments reviewed.  Follow up needed: none  Other Healthcare Providers Involved in Patient s Care:         Specialist appointment - [facility]  Update since discharge: improved.         Plan of care communicated with patient and family               Wt Readings from Last 4 Encounters:   02/03/25 75.6 kg (166 lb 9.6 oz)   01/27/25 73.2 kg (161 lb 6.4 oz)   01/09/25 70.8 kg (156 lb)   12/30/24 68 kg (150 lb)       Review of Systems  CONSTITUTIONAL: NEGATIVE for fever, chills, change in weight  RESP: NEGATIVE for significant cough or SOB  CV: NEGATIVE for chest pain, palpitations : POSITIVE for swelling lower legs and feet  GI: NEGATIVE for nausea, abdominal pain,  "heartburn, or change in bowel habits, declines melena or blood in stool  : NEGATIVE for frequency, dysuria, or hematuria  MUSCULOSKELETAL: NEGATIVE for significant arthralgias or myalgia  NEURO: NEGATIVE for weakness, dizziness or paresthesias  ENDOCRINE: NEGATIVE for temperature intolerance, skin/hair changes  HEME: NEGATIVE for bleeding problems  PSYCHIATRIC: NEGATIVE for changes in mood or affect      Objective    /64 (BP Location: Right arm, Patient Position: Sitting, Cuff Size: Adult Regular)   Pulse (!) 47   Temp 98.3  F (36.8  C) (Tympanic)   Resp 18   Ht 1.676 m (5' 5.98\")   Wt 75.6 kg (166 lb 9.6 oz)   SpO2 98%   BMI 26.90 kg/m    Body mass index is 26.9 kg/m .  Physical Exam   GENERAL: alert and no distress  RESP: lungs clear to auscultation - no rales, rhonchi or wheezes  CV: regular rates and rhythm, grade 3/6 systolic murmur heard best over the 2nd ICS, and 1+ bilateral lower extremity pitting edema from below knees to feet    ABDOMEN: soft, nontender, no hepatosplenomegaly, no masses and bowel sounds normal  MS: no gross musculoskeletal defects noted, no edema  NEURO: Normal strength and tone, mentation intact and speech normal  PSYCH: mentation appears normal, affect normal/bright            Signed Electronically by: Janny Weston NP Student      I saw this patient in collaboration with Janny Weston.        I was present with the APRN/PA student (Janny Weston) who participated in the service and in the documentation of the services provided. I have verified the history and personally performed the physical exam and medical decision making, as documented by the student and edited by me.    Mae Jones, MAYANK CNP    "

## 2025-02-10 ENCOUNTER — LAB (OUTPATIENT)
Dept: LAB | Facility: CLINIC | Age: 70
End: 2025-02-10
Payer: COMMERCIAL

## 2025-02-10 ENCOUNTER — HOSPITAL ENCOUNTER (OUTPATIENT)
Dept: CARDIOLOGY | Facility: CLINIC | Age: 70
Discharge: HOME | End: 2025-02-10
Attending: NURSE PRACTITIONER | Admitting: NURSE PRACTITIONER
Payer: COMMERCIAL

## 2025-02-10 DIAGNOSIS — R01.1 HEART MURMUR: ICD-10-CM

## 2025-02-10 DIAGNOSIS — R60.0 PERIPHERAL EDEMA: ICD-10-CM

## 2025-02-10 LAB
ANION GAP SERPL CALCULATED.3IONS-SCNC: 12 MMOL/L (ref 7–15)
BUN SERPL-MCNC: 22.5 MG/DL (ref 8–23)
CALCIUM SERPL-MCNC: 8.9 MG/DL (ref 8.8–10.4)
CHLORIDE SERPL-SCNC: 104 MMOL/L (ref 98–107)
CREAT SERPL-MCNC: 1.3 MG/DL (ref 0.67–1.17)
EGFRCR SERPLBLD CKD-EPI 2021: 60 ML/MIN/1.73M2
GLUCOSE SERPL-MCNC: 90 MG/DL (ref 70–99)
HCO3 SERPL-SCNC: 24 MMOL/L (ref 22–29)
LVEF ECHO: NORMAL
NT-PROBNP SERPL-MCNC: 1422 PG/ML (ref 0–900)
POTASSIUM SERPL-SCNC: 4.4 MMOL/L (ref 3.4–5.3)
SODIUM SERPL-SCNC: 140 MMOL/L (ref 135–145)

## 2025-02-10 PROCEDURE — 93306 TTE W/DOPPLER COMPLETE: CPT | Mod: 26 | Performed by: INTERNAL MEDICINE

## 2025-02-10 PROCEDURE — 80048 BASIC METABOLIC PNL TOTAL CA: CPT

## 2025-02-10 PROCEDURE — 93306 TTE W/DOPPLER COMPLETE: CPT

## 2025-02-10 PROCEDURE — 36415 COLL VENOUS BLD VENIPUNCTURE: CPT

## 2025-02-10 PROCEDURE — 83880 ASSAY OF NATRIURETIC PEPTIDE: CPT

## 2025-02-11 ENCOUNTER — MYC MEDICAL ADVICE (OUTPATIENT)
Dept: FAMILY MEDICINE | Facility: CLINIC | Age: 70
End: 2025-02-11
Payer: COMMERCIAL

## 2025-02-11 DIAGNOSIS — R01.1 HEART MURMUR: ICD-10-CM

## 2025-02-11 DIAGNOSIS — R60.0 PERIPHERAL EDEMA: ICD-10-CM

## 2025-02-11 DIAGNOSIS — I35.8 AORTIC VALVE SCLEROSIS: Primary | ICD-10-CM

## 2025-02-12 RX ORDER — FUROSEMIDE 20 MG/1
20 TABLET ORAL DAILY
Qty: 30 TABLET | Refills: 1 | Status: SHIPPED | OUTPATIENT
Start: 2025-02-12

## 2025-02-12 NOTE — TELEPHONE ENCOUNTER
Pt sends mychart that lasix is working and would like to refill .       Sending to provider to determine quantity      Mitchell Rubio RN

## 2025-02-16 NOTE — PATIENT INSTRUCTIONS
Thank you for choosing [facility].  You may be receiving an email and/or telephone survey request from [facility] [employer] regarding your visit today.  Please take a few minutes to respond to the survey to let us know how we are doing.      If you have questions or concerns, please contact us via [employer] or you can contact your care team at [phone] option 2.    Our Clinic hours are:  Monday - Thursday 7am-6pm  Friday 7am-5pm    The [place] outpatient lab hours are:  Monday - Friday 7am-4:30pm    Appointments are required, call [phone]    If you have clinical questions after hours or would like to schedule an appointment,  call the clinic at [phone].        1. Blood work, xray urine test today  2. If testing is normal, then may be a virus that has to run its course  3. Follow-up on Monday or Tuesday if not feeling better   no

## 2025-04-01 ENCOUNTER — OFFICE VISIT (OUTPATIENT)
Dept: FAMILY MEDICINE | Facility: CLINIC | Age: 70
End: 2025-04-01
Payer: COMMERCIAL

## 2025-04-01 VITALS
WEIGHT: 165 LBS | HEART RATE: 67 BPM | TEMPERATURE: 99.4 F | RESPIRATION RATE: 20 BRPM | BODY MASS INDEX: 27.49 KG/M2 | DIASTOLIC BLOOD PRESSURE: 58 MMHG | HEIGHT: 65 IN | SYSTOLIC BLOOD PRESSURE: 132 MMHG | OXYGEN SATURATION: 95 %

## 2025-04-01 DIAGNOSIS — R06.2 WHEEZING: ICD-10-CM

## 2025-04-01 DIAGNOSIS — J06.9 VIRAL URI WITH COUGH: Primary | ICD-10-CM

## 2025-04-01 PROCEDURE — 3078F DIAST BP <80 MM HG: CPT | Performed by: NURSE PRACTITIONER

## 2025-04-01 PROCEDURE — 1126F AMNT PAIN NOTED NONE PRSNT: CPT | Performed by: NURSE PRACTITIONER

## 2025-04-01 PROCEDURE — 99213 OFFICE O/P EST LOW 20 MIN: CPT | Performed by: NURSE PRACTITIONER

## 2025-04-01 PROCEDURE — 87635 SARS-COV-2 COVID-19 AMP PRB: CPT | Performed by: NURSE PRACTITIONER

## 2025-04-01 PROCEDURE — 3075F SYST BP GE 130 - 139MM HG: CPT | Performed by: NURSE PRACTITIONER

## 2025-04-01 PROCEDURE — 87804 INFLUENZA ASSAY W/OPTIC: CPT | Performed by: NURSE PRACTITIONER

## 2025-04-01 RX ORDER — METHYLPREDNISOLONE 4 MG/1
TABLET ORAL
Qty: 21 TABLET | Refills: 0 | Status: SHIPPED | OUTPATIENT
Start: 2025-04-01

## 2025-04-01 RX ORDER — ALBUTEROL SULFATE 90 UG/1
2 INHALANT RESPIRATORY (INHALATION) EVERY 4 HOURS PRN
Qty: 18 G | Refills: 0 | Status: SHIPPED | OUTPATIENT
Start: 2025-04-01

## 2025-04-01 ASSESSMENT — PAIN SCALES - GENERAL: PAINLEVEL_OUTOF10: NO PAIN (0)

## 2025-04-01 NOTE — LETTER
2025    Donnie Ernandez   1955        To Whom it May Concern;    Please excuse Donnie Ernandez from work due to acute illness 3/31/25 and 25    Sincerely,        MAYANK Lutz CNP

## 2025-04-01 NOTE — LETTER
April 1, 2025      Donnie Ernandez  [address] [place] [place] [address]        To Whom It May Concern:    Donnie Ernandez  was seen on 4/1/2025.  Please excuse him until 4/3/2025 due to illness.        Sincerely,        MAYANK Lutz CNP    Electronically signed

## 2025-04-01 NOTE — PROGRESS NOTES
Assessment & Plan     Viral URI with cough    Wheezing  - Influenza A & B Antigen - Clinic Collect  - COVID-19 Virus (Coronavirus) by PCR Nose  - albuterol (PROAIR HFA/PROVENTIL HFA/VENTOLIN HFA) 108 (90 Base) MCG/ACT inhaler; Inhale 2 puffs into the lungs every 4 hours as needed.  - methylPREDNISolone (MEDROL DOSEPAK) 4 MG tablet therapy pack; Follow Package Directions    Illness for 2 days.  Patient's main complaints are wheezing and coughing.  Wheezing on exam.  Influenza test today negative.  COVID still pending.  Recommend albuterol inhaler - he has a spacer at home.  He is also asking for Medrol dose tevin as this has helped him before.    Follow up immediately for shortness of breath, chest pain, worsening symptoms, fever.    The risks, benefits and treatment options of prescribed medications or other treatments have been discussed with the patient. The patient verbalized their understanding and should call or follow up if no improvement or if they develop further problems.  Mae Jones CNP                Russ Fields is a 69 year old, presenting for the following health issues:  Cough        4/1/2025     8:37 AM   Additional Questions   Roomed by Preeti AMBROSIO CMA   Accompanied by self         4/1/2025   Forms   Any forms needing to be completed Yes- excuse for work yesterday and today         4/1/2025     8:37 AM   Patient Reported Additional Medications   Patient reports taking the following new medications none     HPI        Acute Illness  Acute illness concerns: cough, wheezing  Onset/Duration: Sunday night- 2 days  Symptoms:  Fever: No  Chills/Sweats: YES  Headache (location?): No  Sinus Pressure: No  Conjunctivitis:  No  Ear Pain: no  Rhinorrhea: YES  Congestion: No  Sore Throat: No  Cough: YES-non-productive  Wheeze: YES  Shortness of breath  Decreased Appetite: maybe a little  Nausea: No  Vomiting: No  Diarrhea: No  Dysuria/Freq.: No  Dysuria or Hematuria: No  Fatigue/Achiness: YES  Sick/Strep  "Exposure: No  Therapies tried and outcome: None        Review of Systems  Constitutional, HEENT, cardiovascular, pulmonary, gi and gu systems are negative, except as otherwise noted.      Objective    /58 (BP Location: Right arm, Patient Position: Sitting, Cuff Size: Adult Regular)   Pulse 67   Temp 99.4  F (37.4  C) (Tympanic)   Resp 20   Ht 1.651 m (5' 5\")   Wt 74.8 kg (165 lb)   SpO2 95%   BMI 27.46 kg/m    Body mass index is 27.46 kg/m .  Physical Exam   GENERAL: alert and no distress  HENT: ear canals and TM's normal, nose and mouth without ulcers or lesions  NECK: no adenopathy, no asymmetry, masses, or scars  RESP: expiratory wheezes throughout and inspiratory wheezes throughout  CV: regular rate and rhythm, normal S1 S2, no S3 or S4, no murmur, click or rub, no peripheral edema             Signed Electronically by: MAYANK Lutz CNP    "

## 2025-04-02 RX ORDER — BENZONATATE 100 MG/1
100 CAPSULE ORAL 3 TIMES DAILY PRN
Qty: 30 CAPSULE | Refills: 0 | Status: SHIPPED | OUTPATIENT
Start: 2025-04-02

## 2025-04-03 ENCOUNTER — TELEPHONE (OUTPATIENT)
Dept: FAMILY MEDICINE | Facility: CLINIC | Age: 70
End: 2025-04-03
Payer: COMMERCIAL

## 2025-04-03 NOTE — LETTER
April 3, 2025      Donnie Ernandez  [address] [place] [place] [address]        To Whom It May Concern:    Donnie Ernandez  was seen on 4/1/2025.  Please excuse him until 4/4/2025 due to illness.        Sincerely,        MAYANK Lutz CNP    Electronically signed

## 2025-04-03 NOTE — TELEPHONE ENCOUNTER
Patient would like work note for today as well.  Still not feeling well enough to work due to coughing.  Will return tomorrow.      Letter pended for signature.  Please return to myself or Uziel for faxing.    Thank you
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
## 2025-04-17 ENCOUNTER — OFFICE VISIT (OUTPATIENT)
Dept: CARDIOLOGY | Facility: CLINIC | Age: 70
End: 2025-04-17
Payer: COMMERCIAL

## 2025-04-17 VITALS
OXYGEN SATURATION: 97 % | RESPIRATION RATE: 14 BRPM | WEIGHT: 165 LBS | SYSTOLIC BLOOD PRESSURE: 131 MMHG | HEART RATE: 50 BPM | BODY MASS INDEX: 27.46 KG/M2 | DIASTOLIC BLOOD PRESSURE: 69 MMHG

## 2025-04-17 DIAGNOSIS — I35.8 AORTIC VALVE SCLEROSIS: ICD-10-CM
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
## 2025-04-17 NOTE — LETTER
2025    Donnie CASTILLO Awais   1955        To Whom it May Concern;    Please excuse Donnie Ernandez from work/school due to a healthcare visit on 2025.    Sincerely,        William Avila MD

## 2025-05-26 PROBLEM — Z90.81 H/O SPLENECTOMY: Chronic | Status: ACTIVE | Noted: 2025-01-25

## 2025-05-26 PROBLEM — E78.5 HYPERLIPIDEMIA: Chronic | Status: ACTIVE | Noted: 2025-05-26

## 2025-05-26 PROBLEM — Z86.73 HISTORY OF STROKE: Chronic | Status: ACTIVE | Noted: 2024-10-14

## 2025-05-26 PROBLEM — K92.2 GI BLEED: Status: RESOLVED | Noted: 2025-01-25 | Resolved: 2025-05-26

## 2025-05-26 PROBLEM — D36.9 TUBULAR ADENOMA: Status: RESOLVED | Noted: 2017-10-03 | Resolved: 2025-05-26

## 2025-05-26 PROBLEM — F10.129 ALCOHOL ABUSE WITH INTOXICATION: Status: RESOLVED | Noted: 2023-12-09 | Resolved: 2025-05-26

## 2025-05-28 ENCOUNTER — OFFICE VISIT (OUTPATIENT)
Dept: SLEEP MEDICINE | Facility: CLINIC | Age: 70
End: 2025-05-28
Payer: COMMERCIAL

## 2025-05-28 VITALS
SYSTOLIC BLOOD PRESSURE: 158 MMHG | WEIGHT: 164 LBS | HEIGHT: 65 IN | BODY MASS INDEX: 27.32 KG/M2 | HEART RATE: 54 BPM | OXYGEN SATURATION: 98 % | RESPIRATION RATE: 16 BRPM | DIASTOLIC BLOOD PRESSURE: 63 MMHG

## 2025-05-28 DIAGNOSIS — G47.33 OSA (OBSTRUCTIVE SLEEP APNEA): Primary | Chronic | ICD-10-CM

## 2025-05-28 DIAGNOSIS — Z86.73 HISTORY OF STROKE: Chronic | ICD-10-CM

## 2025-05-28 DIAGNOSIS — I10 BENIGN ESSENTIAL HYPERTENSION: Chronic | ICD-10-CM

## 2025-05-28 PROBLEM — G89.29 CHRONIC BACK PAIN GREATER THAN 3 MONTHS DURATION: Chronic | Status: ACTIVE | Noted: 2023-12-09

## 2025-05-28 PROBLEM — I69.959: Chronic | Status: ACTIVE | Noted: 2024-01-26

## 2025-05-28 PROBLEM — M54.9 CHRONIC BACK PAIN GREATER THAN 3 MONTHS DURATION: Chronic | Status: ACTIVE | Noted: 2023-12-09

## 2025-05-28 PROBLEM — F33.0 MDD (MAJOR DEPRESSIVE DISORDER), RECURRENT EPISODE, MILD: Chronic | Status: ACTIVE | Noted: 2023-12-09

## 2025-05-28 PROCEDURE — 99205 OFFICE O/P NEW HI 60 MIN: CPT | Performed by: INTERNAL MEDICINE

## 2025-05-28 PROCEDURE — 3077F SYST BP >= 140 MM HG: CPT | Performed by: INTERNAL MEDICINE

## 2025-05-28 PROCEDURE — 3078F DIAST BP <80 MM HG: CPT | Performed by: INTERNAL MEDICINE

## 2025-05-28 PROCEDURE — 1126F AMNT PAIN NOTED NONE PRSNT: CPT | Performed by: INTERNAL MEDICINE

## 2025-05-28 ASSESSMENT — SLEEP AND FATIGUE QUESTIONNAIRES
HOW LIKELY ARE YOU TO NOD OFF OR FALL ASLEEP WHILE SITTING INACTIVE IN A PUBLIC PLACE: SLIGHT CHANCE OF DOZING
HOW LIKELY ARE YOU TO NOD OFF OR FALL ASLEEP IN A CAR, WHILE STOPPED FOR A FEW MINUTES IN TRAFFIC: WOULD NEVER DOZE
HOW LIKELY ARE YOU TO NOD OFF OR FALL ASLEEP WHILE SITTING AND TALKING TO SOMEONE: WOULD NEVER DOZE
HOW LIKELY ARE YOU TO NOD OFF OR FALL ASLEEP WHILE LYING DOWN TO REST IN THE AFTERNOON WHEN CIRCUMSTANCES PERMIT: SLIGHT CHANCE OF DOZING
HOW LIKELY ARE YOU TO NOD OFF OR FALL ASLEEP WHILE SITTING AND READING: WOULD NEVER DOZE
HOW LIKELY ARE YOU TO NOD OFF OR FALL ASLEEP WHILE WATCHING TV: SLIGHT CHANCE OF DOZING
HOW LIKELY ARE YOU TO NOD OFF OR FALL ASLEEP WHEN YOU ARE A PASSENGER IN A CAR FOR AN HOUR WITHOUT A BREAK: SLIGHT CHANCE OF DOZING
HOW LIKELY ARE YOU TO NOD OFF OR FALL ASLEEP WHILE SITTING QUIETLY AFTER LUNCH WITHOUT ALCOHOL: WOULD NEVER DOZE
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
## 2025-05-28 NOTE — PATIENT INSTRUCTIONS
Read the book Say Good Night To Insomnia                                  Digital CBT-I    Your health care provider has recommended a digital therapeutic program for the treatment of insomnia.  Insomnia  is a self-guided 5-week digital cognitive-behavioral therapy program for insomnia (dCBT-I).  The program follows evidence-based insomnia  treatment strategies similar to those used in our [facility].  It is based on extensive development and collaboration between the [place] [employer], [facility], an other sleep experts.         How Does it work?    Research indicates that digital CBT-I can be effective in the treatment of insomnia for some people.  Insomnia  has very high acceptability among users and in early research has been show to significantly reduce insomnia symptoms and improve sleep quality when compared to a control wait list.    Watch this introductory video for more details about Insomnia .    Insomnia  Introduction  Is Digital CBT-I right for you?    Digital CBT-I has been shown to be effective in treating insomnia across a variety of age group and with individuals who have other health conditions.    You may be a good candidate for Digital CBT-I if:    You believe CBT-I can help you sleep better.  You are motivated to follow a five-week behavioral program.   You are able (or have assistance) to use a mobile Android or IPhone.  Your Medical and/or mental health conditions are stable and treated.    However, it is recommended that before you start using this cesar you consult with your health care provider if you have any of the following that may be affected by changes in your sleep habits:     Sleep Apnea  Restless Leg Syndrome  Bipolar Disorder  Seizure Disorder  Sleep Walking  Night Terrors  Excessive Daytime Sleepiness  Frailty and risk of falling when getting up at night,    The cesar may not be for you if you are a shift worker.   It also may not be helpful for people who regularly have to change their sleep schedules or have to sleep at times that are out of sync with their body's natural sleep rhythm.    It is recommended that before you start using this cesar you consult with your health care provider if you are using prescription or over the counter sleep medication.     Getting Started    Download Insomnia  onto your Apple or Android Phone  Review and accept user agreement  Complete initial screening assessments  Read Understanding Sleep and Insomnia found below.  TIP:  Record your sleep diary in the morning recalling your night of sleep. Do not monitor ur use the clock during the night when using this program.    Understanding Sleep and Insomnia    Most people have trouble sleeping at some point in their life.   Chronic insomnia means you have had trouble falling asleep and/or staying asleep for at least the past three months.  Despite allowing enough time for sleep, it is affecting how you feel. You are not alone.  It is estimated that 10-15% of adults experience chronic insomnia.     Cognitive-Behavioral Therapy for Insomnia    The [employer]  recommends Cognitive Behavioral Therapy for Insomnia (CBT-I) as the first-line treatment for insomnia.     [employer] CBT-I is a five-week program that utilizes the downloadable cesar Insomnia .  It can be completed with the guidance of a trained health care provider or on your own. The program will teach you the skills and strategies you will need for a better night's sleep. The first step in your program is learning a bit about sleep and insomnia.      The Basics of Sleep    How does sleep help us?    Sleep, like food and water, is something we need every day. The purpose of sleep is still not exactly clear, but sleep experts agree we need consistent quality sleep to function at our best. There is evidence that sleep helps maintain brain and body  functions.  It helps maintain thinking ability and mood.  Sleep is actually a very active part of life. Sleep occurs in four stages that cycle every  minutes throughout the night. We get our deepest sleep during the first few hours of sleep.  During the last half of our sleep, we usually get the bulk of our REM (Rapid Eye Movement) sleep.  REM sleep is when most of our dreaming occurs.    Watch the following short video to learn more:  Stages of Sleep    How much sleep do we need?    Sleep needs vary from person to person. Most adults need at least 7 hours of sleep though some may need less and others more.  Sleeping too little or too much may be a health risk.  As we age, most people report their sleep gets lighter, earlier, shorter, and more restless.     What Controls Sleep?    The three things that regulate your sleep are your:     Sleep Drive  Biological Clock  Arousal System (physical and emotional states)    Together these make us feel alert during the day and promote sleep at night.    Your sleep drive depends on how long you have been awake. It is lowest when you first wake up.  Sleep drive increases as the day goes on.  The longer time you are awake the easier it is to fall asleep.  Sleeping gradually reduces your sleep drive. That is why napping in the evening or close to bed can make it harder to sleep at night.    Your biological clock promotes wakefulness during the day and sleep at night.    Adapted from Darwin et al. (2009). Evaluation and Management of Insomnia in the Psychiatric setting. Published Online: 19/1/2009; DOI: https://doi.org/10.1176/foc.7.4.pkc356KH    Watch the following short video to learn more: Two  Processes of Sleep      How does sleep change with age?    Sleep usually is lighter and shorter and earlier.  Sleep may become more restless   Increased time to fall asleep and more time awake during the night    Understanding Insomnia    What is insomnia?    Insomnia occurs when  "you:    Have difficulty with...  Falling Asleep  Staying Asleep  Short amount of sleep    Have adequate time for sleep  Experience daytime problems as a result of your sleep difficulties    What causes insomnia?    Some people have a greater chance of developing insomnia due to biological, psychological or social factors. These are often referred to as predisposing factors.  A host of things can trigger insomnia such as a stressful event, jet lag, working a different shift, medication, or the onset of a medical or mental health condition. There are called precipitating factors.        What maintains insomnia?    Short-term insomnia can become chronic because of habits or conditions that perpetuate it including:    Unhelpful sleep habits such as spending more time in bed, sleeping in on weekends to try to catch up on sleep  Stress, worry or depression  Worry or fear about your insomnia  Pain or other medical conditions  Some medications  Untreated sleep disorders    As you spend more time in bed or try forcing yourself to sleep your bed becomes linked with wakefulness.  This type of  conditioning  along with unhelpful sleep habits maintains the insomnia even when the triggering event has resolved.    Sleep and Your Arousal System    Mental activity, emotions and physical symptoms can make your brain too active to sleep by masking the strength of your sleep drive. Your brain's arousal system not only can triggers insomnia.  It also plays a role in maintaining it as well.  Common sources of arousal include:    Worry about sleep  An active mind concerned about unfinished tasks  Anxiety, stress and depression  Pain    Common Treatments for Insomnia    Behavioral:  Changing your behavior and habits to improve your sleep  Sleeping Pills (Prescription and OTC)  Antidepressants   \"Alternative\" remedies (Melatonin, Ashwagandha, Chamomile, Valerian, 5-HTP etc.)    How CBT-I Works     CBT-I targets negative behavioral " "conditioning and habits that hurt your sleep and lead to long-term insomnia     Behavioral Conditioning    When you lay awake in bed over many nights, your body actually becomes trained or 'conditioned' to be awake during the night.  It makes your bed trigger alertness instead of sleepiness.  Brief CBT-I helps strengthen the behavioral association between sleep and your bed.    Habits that HURT sleep:    Using your bed for things other than sleep and intimacy  Shifting sleep schedules from night to night  Extending time in bed  Worrying  Worrying about sleep or trying too hard to sleep  Lack of a \"wind down\" time before bed  Long or late naps  Uncomfortable sleep environment  Alcohol  Caffeine    Developing habits that HELP your sleep:    Keeping the bed for sleep and intimacy  Maintaining a consistent sleep schedule  Daily routines including time to wind down and manage worry  Managing thoughts and expectations about sleep        "

## 2025-05-28 NOTE — NURSING NOTE
"Chief Complaint   Patient presents with    Sleep Problem     Patient is present today with a hx of a stroke. Referred by Neurologist.        Initial BP (!) 158/63   Pulse 54   Resp 16   Ht 1.651 m (5' 5\")   Wt 74.4 kg (164 lb)   SpO2 98%   BMI 27.29 kg/m   Estimated body mass index is 27.29 kg/m  as calculated from the following:    Height as of this encounter: 1.651 m (5' 5\").    Weight as of this encounter: 74.4 kg (164 lb).    Medication Reconciliation: complete    Neck circumference: 15.75 inches / 40 centimeters.    DME: n/a    Patt Caban CMA on 5/28/2025 at 2:41 PM      "

## 2025-06-10 ENCOUNTER — TELEPHONE (OUTPATIENT)
Dept: FAMILY MEDICINE | Facility: CLINIC | Age: 70
End: 2025-06-10
Payer: COMMERCIAL

## 2025-06-10 NOTE — TELEPHONE ENCOUNTER
Patient Quality Outreach    Patient is due for the following:   Hypertension -  BP check  IVD  -  BP Check  Physical Annual Wellness Visit    Action(s) Taken:   Schedule a office visit for htn or  Annual Wellness Visit    Type of outreach:    Sent [employer] message.    Questions for provider review:    None         Preeti Albright, [facility]  Chart routed to None.

## 2025-06-21 ENCOUNTER — HEALTH MAINTENANCE LETTER (OUTPATIENT)
Age: 70
End: 2025-06-21

## 2025-07-07 ENCOUNTER — MYC MEDICAL ADVICE (OUTPATIENT)
Dept: FAMILY MEDICINE | Facility: CLINIC | Age: 70
End: 2025-07-07
Payer: COMMERCIAL

## 2025-07-07 DIAGNOSIS — F51.01 PRIMARY INSOMNIA: Primary | ICD-10-CM

## 2025-07-07 RX ORDER — ZOLPIDEM TARTRATE 5 MG/1
5 TABLET ORAL
Qty: 30 TABLET | Refills: 5 | Status: SHIPPED | OUTPATIENT
Start: 2025-07-07

## 2025-07-07 NOTE — TELEPHONE ENCOUNTER
Forwarding [place] request for zolpidem dose increase to PCP for consideration please.  Appears preferred pharmacy is [facility] in [place].     Thanks,  Mitra Campbell RN  [facility]

## 2025-07-15 DIAGNOSIS — Z86.73 HISTORY OF STROKE: ICD-10-CM

## 2025-07-15 RX ORDER — ROSUVASTATIN CALCIUM 10 MG/1
10 TABLET, COATED ORAL DAILY
Qty: 90 TABLET | Refills: 2 | Status: SHIPPED | OUTPATIENT
Start: 2025-07-15

## 2025-08-17 ENCOUNTER — MYC MEDICAL ADVICE (OUTPATIENT)
Dept: FAMILY MEDICINE | Facility: CLINIC | Age: 70
End: 2025-08-17
Payer: COMMERCIAL

## (undated) RX ORDER — METHYLPREDNISOLONE ACETATE 40 MG/ML
INJECTION, SUSPENSION INTRA-ARTICULAR; INTRALESIONAL; INTRAMUSCULAR; SOFT TISSUE
Status: DISPENSED
Start: 2018-05-16

## (undated) RX ORDER — PROPOFOL 10 MG/ML
INJECTION, EMULSION INTRAVENOUS
Status: DISPENSED
Start: 2021-04-30

## (undated) RX ORDER — GLYCOPYRROLATE 0.2 MG/ML
INJECTION, SOLUTION INTRAMUSCULAR; INTRAVENOUS
Status: DISPENSED
Start: 2017-09-25

## (undated) RX ORDER — BUPIVACAINE HYDROCHLORIDE 5 MG/ML
INJECTION, SOLUTION EPIDURAL; INTRACAUDAL
Status: DISPENSED
Start: 2018-05-16

## (undated) RX ORDER — DEXAMETHASONE SODIUM PHOSPHATE 10 MG/ML
INJECTION, SOLUTION INTRAMUSCULAR; INTRAVENOUS
Status: DISPENSED
Start: 2018-05-16

## (undated) RX ORDER — LIDOCAINE HYDROCHLORIDE 10 MG/ML
INJECTION, SOLUTION EPIDURAL; INFILTRATION; INTRACAUDAL; PERINEURAL
Status: DISPENSED
Start: 2021-04-30

## (undated) RX ORDER — FENTANYL CITRATE 50 UG/ML
INJECTION, SOLUTION INTRAMUSCULAR; INTRAVENOUS
Status: DISPENSED
Start: 2025-01-26